# Patient Record
Sex: MALE | Race: WHITE | Employment: OTHER | URBAN - METROPOLITAN AREA
[De-identification: names, ages, dates, MRNs, and addresses within clinical notes are randomized per-mention and may not be internally consistent; named-entity substitution may affect disease eponyms.]

---

## 2017-01-27 ENCOUNTER — HOSPITAL ENCOUNTER (OUTPATIENT)
Dept: CT IMAGING | Facility: HOSPITAL | Age: 77
Discharge: HOME | End: 2017-01-27
Attending: INTERNAL MEDICINE

## 2017-01-27 DIAGNOSIS — Z13.9 SCREENING: ICD-10-CM

## 2017-01-30 ENCOUNTER — OFFICE VISIT (OUTPATIENT)
Dept: FAMILY MEDICINE CLINIC | Facility: CLINIC | Age: 77
End: 2017-01-30

## 2017-01-30 VITALS
RESPIRATION RATE: 16 BRPM | WEIGHT: 213 LBS | OXYGEN SATURATION: 98 % | HEART RATE: 74 BPM | BODY MASS INDEX: 29.82 KG/M2 | HEIGHT: 71 IN | TEMPERATURE: 97 F | DIASTOLIC BLOOD PRESSURE: 80 MMHG | SYSTOLIC BLOOD PRESSURE: 150 MMHG

## 2017-01-30 DIAGNOSIS — J45.30 MILD PERSISTENT ASTHMA WITHOUT COMPLICATION: ICD-10-CM

## 2017-01-30 DIAGNOSIS — I70.90 ATHEROSCLEROSIS: Primary | ICD-10-CM

## 2017-01-30 DIAGNOSIS — E11.9 CONTROLLED TYPE 2 DIABETES MELLITUS WITHOUT COMPLICATION, WITHOUT LONG-TERM CURRENT USE OF INSULIN (HCC): ICD-10-CM

## 2017-01-30 DIAGNOSIS — R93.1 ABNORMAL CT SCAN, HEART: ICD-10-CM

## 2017-01-30 PROCEDURE — 93000 ELECTROCARDIOGRAM COMPLETE: CPT | Performed by: FAMILY MEDICINE

## 2017-01-30 PROCEDURE — 99214 OFFICE O/P EST MOD 30 MIN: CPT | Performed by: FAMILY MEDICINE

## 2017-01-30 NOTE — PATIENT INSTRUCTIONS
Return for fasting blood work. Call cardiologist for evaluation. Call us back with your appointment date  with cardiologist.  Consider restarting inhalers for asthma. Low carbohydrate diet. Return for Super visit.

## 2017-01-31 ENCOUNTER — APPOINTMENT (OUTPATIENT)
Dept: LAB | Age: 77
End: 2017-01-31
Attending: FAMILY MEDICINE
Payer: MEDICARE

## 2017-01-31 ENCOUNTER — TELEPHONE (OUTPATIENT)
Dept: FAMILY MEDICINE CLINIC | Facility: CLINIC | Age: 77
End: 2017-01-31

## 2017-01-31 LAB
ALBUMIN SERPL-MCNC: 3.9 G/DL (ref 3.5–4.8)
ALP LIVER SERPL-CCNC: 51 U/L (ref 45–117)
ALT SERPL-CCNC: 32 U/L (ref 17–63)
AST SERPL-CCNC: 19 U/L (ref 15–41)
BASOPHILS # BLD AUTO: 0.04 X10(3) UL (ref 0–0.1)
BASOPHILS NFR BLD AUTO: 0.6 %
BILIRUB SERPL-MCNC: 1 MG/DL (ref 0.1–2)
BUN BLD-MCNC: 17 MG/DL (ref 8–20)
CALCIUM BLD-MCNC: 8.5 MG/DL (ref 8.3–10.3)
CHLORIDE: 104 MMOL/L (ref 101–111)
CHOLEST SMN-MCNC: 148 MG/DL (ref ?–200)
CO2: 26 MMOL/L (ref 22–32)
CREAT BLD-MCNC: 0.9 MG/DL (ref 0.7–1.3)
CREAT UR-SCNC: 191 MG/DL
EOSINOPHIL # BLD AUTO: 0.19 X10(3) UL (ref 0–0.3)
EOSINOPHIL NFR BLD AUTO: 2.6 %
ERYTHROCYTE [DISTWIDTH] IN BLOOD BY AUTOMATED COUNT: 12.5 % (ref 11.5–16)
EST. AVERAGE GLUCOSE BLD GHB EST-MCNC: 137 MG/DL (ref 68–126)
GLUCOSE BLD-MCNC: 124 MG/DL (ref 70–99)
HBA1C MFR BLD HPLC: 6.4 % (ref ?–5.7)
HCT VFR BLD AUTO: 48.7 % (ref 37–53)
HDLC SERPL-MCNC: 44 MG/DL (ref 45–?)
HDLC SERPL: 3.36 {RATIO} (ref ?–4.97)
HGB BLD-MCNC: 16.7 G/DL (ref 13–17)
IMMATURE GRANULOCYTE COUNT: 0.02 X10(3) UL (ref 0–1)
IMMATURE GRANULOCYTE RATIO %: 0.3 %
LDLC SERPL CALC-MCNC: 79 MG/DL (ref ?–130)
LYMPHOCYTES # BLD AUTO: 2.02 X10(3) UL (ref 0.9–4)
LYMPHOCYTES NFR BLD AUTO: 27.9 %
M PROTEIN MFR SERPL ELPH: 7.1 G/DL (ref 6.1–8.3)
MCH RBC QN AUTO: 32.4 PG (ref 27–33.2)
MCHC RBC AUTO-ENTMCNC: 34.3 G/DL (ref 31–37)
MCV RBC AUTO: 94.6 FL (ref 80–99)
MICROALBUMIN UR-MCNC: 1.08 MG/DL
MICROALBUMIN/CREAT 24H UR-RTO: 5.7 UG/MG (ref ?–30)
MONOCYTES # BLD AUTO: 0.57 X10(3) UL (ref 0.1–0.6)
MONOCYTES NFR BLD AUTO: 7.9 %
NEUTROPHIL ABS PRELIM: 4.39 X10 (3) UL (ref 1.3–6.7)
NEUTROPHILS # BLD AUTO: 4.39 X10(3) UL (ref 1.3–6.7)
NEUTROPHILS NFR BLD AUTO: 60.7 %
NONHDLC SERPL-MCNC: 104 MG/DL (ref ?–130)
PLATELET # BLD AUTO: 246 10(3)UL (ref 150–450)
POTASSIUM SERPL-SCNC: 4 MMOL/L (ref 3.6–5.1)
RBC # BLD AUTO: 5.15 X10(6)UL (ref 3.8–5.8)
RED CELL DISTRIBUTION WIDTH-SD: 43.1 FL (ref 35.1–46.3)
SODIUM SERPL-SCNC: 139 MMOL/L (ref 136–144)
TRIGLYCERIDES: 123 MG/DL (ref ?–150)
VLDL: 25 MG/DL (ref 5–40)
WBC # BLD AUTO: 7.2 X10(3) UL (ref 4–13)

## 2017-01-31 PROCEDURE — 83036 HEMOGLOBIN GLYCOSYLATED A1C: CPT | Performed by: FAMILY MEDICINE

## 2017-01-31 PROCEDURE — 82570 ASSAY OF URINE CREATININE: CPT | Performed by: FAMILY MEDICINE

## 2017-01-31 PROCEDURE — 85025 COMPLETE CBC W/AUTO DIFF WBC: CPT | Performed by: FAMILY MEDICINE

## 2017-01-31 PROCEDURE — 82043 UR ALBUMIN QUANTITATIVE: CPT | Performed by: FAMILY MEDICINE

## 2017-01-31 PROCEDURE — 80061 LIPID PANEL: CPT | Performed by: FAMILY MEDICINE

## 2017-01-31 PROCEDURE — 36415 COLL VENOUS BLD VENIPUNCTURE: CPT | Performed by: FAMILY MEDICINE

## 2017-01-31 PROCEDURE — 80053 COMPREHEN METABOLIC PANEL: CPT | Performed by: FAMILY MEDICINE

## 2017-01-31 NOTE — PROGRESS NOTES
Chuy Baez is a 77year old male. cc abnormal ultrafast heart scan results, diabetes, asthma,  HPI:     Patient is coming to discuss test results of the ultrafast heart scan. The score was 1334.3 .   Patient had ultrafast heart scan done 2 years ago breath with exertion  CARDIOVASCULAR: denies chest pain on exertion, no chest pain at rest no palpitations  GI: denies abdominal pain and denies heartburn  NEURO: denies headaches  Psych normal mood    EXAM:   /80 mmHg  Pulse 74  Temp(Src) 97.2 °F (3 greatest dimension. The specimen is submitted in   toto in one cassette.  (ZQ/nj) [FORMATTING REMOVED]    Interpretation Abnormal (A)      ASSESSMENT AND PLAN:   Atherosclerosis  (primary encounter diagnosis)  Controlled type 2 diabetes mellitus without com

## 2017-01-31 NOTE — TELEPHONE ENCOUNTER
Dr. Una Wilson wanted pt to let her know when he is seeing cardiologist, Dr. Danuta Chan. Pt's appt is tomorrow, Feb 1 at 11:30.

## 2017-02-01 ENCOUNTER — MYAURORA ACCOUNT LINK (OUTPATIENT)
Dept: OTHER | Age: 77
End: 2017-02-01

## 2017-02-01 ENCOUNTER — PRIOR ORIGINAL RECORDS (OUTPATIENT)
Dept: OTHER | Age: 77
End: 2017-02-01

## 2017-02-02 ENCOUNTER — TELEPHONE (OUTPATIENT)
Dept: FAMILY MEDICINE CLINIC | Facility: CLINIC | Age: 77
End: 2017-02-02

## 2017-02-06 LAB
ALBUMIN: 3.9 G/DL
ALKALINE PHOSPHATATE(ALK PHOS): 51 IU/L
BILIRUBIN TOTAL: 1 MG/DL
BUN: 17 MG/DL
CALCIUM: 8.5 MG/DL
CHLORIDE: 104 MEQ/L
CHOLESTEROL, TOTAL: 148 MG/DL
CREATININE, SERUM: 0.9 MG/DL
GLUCOSE: 124 MG/DL
HDL CHOLESTEROL: 44 MG/DL
HEMATOCRIT: 48.7 %
HEMOGLOBIN A1C: 6.4 %
HEMOGLOBIN: 16.7 G/DL
LDL CHOLESTEROL: 79 MG/DL
PLATELETS: 246 K/UL
POTASSIUM, SERUM: 4 MEQ/L
PROTEIN, TOTAL: 7.1 G/DL
RED BLOOD COUNT: 5.15 X 10-6/U
SGOT (AST): 19 IU/L
SGPT (ALT): 32 IU/L
SODIUM: 139 MEQ/L
TRIGLYCERIDES: 123 MG/DL
WHITE BLOOD COUNT: 7.2 X 10-3/U

## 2017-02-07 ENCOUNTER — HOSPITAL ENCOUNTER (OUTPATIENT)
Dept: CV DIAGNOSTICS | Facility: HOSPITAL | Age: 77
Discharge: HOME | End: 2017-02-07
Attending: INTERNAL MEDICINE
Payer: MEDICARE

## 2017-02-07 DIAGNOSIS — Z82.49 FAMILY HISTORY OF ISCHEMIC HEART DISEASE: ICD-10-CM

## 2017-02-07 DIAGNOSIS — R93.1 ABNORMAL CT SCAN OF HEART: ICD-10-CM

## 2017-02-07 DIAGNOSIS — E11.9 DIABETES MELLITUS (HCC): ICD-10-CM

## 2017-02-07 PROCEDURE — 93017 CV STRESS TEST TRACING ONLY: CPT

## 2017-02-07 PROCEDURE — 78452 HT MUSCLE IMAGE SPECT MULT: CPT | Performed by: INTERNAL MEDICINE

## 2017-02-07 PROCEDURE — 93018 CV STRESS TEST I&R ONLY: CPT | Performed by: INTERNAL MEDICINE

## 2017-02-07 PROCEDURE — 78452 HT MUSCLE IMAGE SPECT MULT: CPT

## 2017-02-09 ENCOUNTER — PRIOR ORIGINAL RECORDS (OUTPATIENT)
Dept: OTHER | Age: 77
End: 2017-02-09

## 2017-02-10 ENCOUNTER — TELEPHONE (OUTPATIENT)
Dept: OTHER | Facility: HOSPITAL | Age: 77
End: 2017-02-10

## 2017-02-10 NOTE — PROGRESS NOTES
F/U call made to patient regarding CTHS score of 1334.3; patient reports that he had a stress test and is doing well. All questions and concerns addressed at this time.

## 2017-02-13 ENCOUNTER — PRIOR ORIGINAL RECORDS (OUTPATIENT)
Dept: OTHER | Age: 77
End: 2017-02-13

## 2017-04-24 ENCOUNTER — TELEPHONE (OUTPATIENT)
Dept: FAMILY MEDICINE CLINIC | Facility: CLINIC | Age: 77
End: 2017-04-24

## 2017-04-24 NOTE — TELEPHONE ENCOUNTER
Please call patient to schedule an office visit with Dr. Kadeem Reynoso for hypertention / blood pressure.   Last ov 1/30/17

## 2017-04-26 PROBLEM — J44.89 ASTHMA WITH COPD (CHRONIC OBSTRUCTIVE PULMONARY DISEASE) (HCC): Chronic | Status: ACTIVE | Noted: 2017-04-26

## 2017-04-26 PROBLEM — J44.9 ASTHMA WITH COPD (CHRONIC OBSTRUCTIVE PULMONARY DISEASE) (HCC): Chronic | Status: ACTIVE | Noted: 2017-04-26

## 2017-04-26 PROBLEM — J44.89 ASTHMA WITH COPD (CHRONIC OBSTRUCTIVE PULMONARY DISEASE): Chronic | Status: ACTIVE | Noted: 2017-04-26

## 2017-04-26 NOTE — TELEPHONE ENCOUNTER
Spoke with patient and informed him of below needed appt. Pt declined scheduling this now. Would like to wait until August and then will schedule.

## 2017-05-15 NOTE — TELEPHONE ENCOUNTER
Spoke with the patient wife regarding scheduling his [employer]. She stated that she will discuss the time with him when they will be able to schedule.

## 2017-05-26 NOTE — TELEPHONE ENCOUNTER
Spoke with the patient wife Meño regarding scheduling his [employer]. Maria Ines Ruizsharon stated that she explained to him that he should schedule his appt and per Nicolasa Du  He stated that \" he doesn't need that'. She is unsure if he will call and schedule.

## 2017-06-26 ENCOUNTER — TELEPHONE (OUTPATIENT)
Dept: FAMILY MEDICINE CLINIC | Facility: CLINIC | Age: 77
End: 2017-06-26

## 2017-06-26 NOTE — TELEPHONE ENCOUNTER
Called patient to schedule a 60 minute MA Supervisit in June. Patient states he does not want to schedule the visit in June but would like a call back in August to schedule the visit in September.

## 2017-09-08 ENCOUNTER — MA CHART PREP (OUTPATIENT)
Dept: FAMILY MEDICINE CLINIC | Facility: CLINIC | Age: 77
End: 2017-09-08

## 2017-09-08 PROBLEM — D12.6 ADENOMATOUS COLON POLYP: Status: ACTIVE | Noted: 2017-09-08

## 2017-09-08 PROBLEM — I25.10 CORONARY ATHEROSCLEROSIS: Status: ACTIVE | Noted: 2017-09-08

## 2017-09-08 PROBLEM — Z86.0101 HISTORY OF ADENOMATOUS POLYP OF COLON: Status: ACTIVE | Noted: 2017-09-08

## 2017-09-08 PROBLEM — Z86.010 HISTORY OF ADENOMATOUS POLYP OF COLON: Status: ACTIVE | Noted: 2017-09-08

## 2017-09-11 ENCOUNTER — OFFICE VISIT (OUTPATIENT)
Dept: FAMILY MEDICINE CLINIC | Facility: CLINIC | Age: 77
End: 2017-09-11

## 2017-09-11 VITALS
BODY MASS INDEX: 30.8 KG/M2 | RESPIRATION RATE: 16 BRPM | HEART RATE: 71 BPM | DIASTOLIC BLOOD PRESSURE: 72 MMHG | SYSTOLIC BLOOD PRESSURE: 132 MMHG | TEMPERATURE: 98 F | HEIGHT: 71 IN | WEIGHT: 220 LBS

## 2017-09-11 DIAGNOSIS — Z96.651 STATUS POST TOTAL RIGHT KNEE REPLACEMENT: ICD-10-CM

## 2017-09-11 DIAGNOSIS — M17.11 PRIMARY LOCALIZED OSTEOARTHROSIS OF RIGHT LOWER LEG: ICD-10-CM

## 2017-09-11 DIAGNOSIS — R35.1 NOCTURIA: ICD-10-CM

## 2017-09-11 DIAGNOSIS — E66.9 OBESITY (BMI 30.0-34.9): ICD-10-CM

## 2017-09-11 DIAGNOSIS — Z86.010 HISTORY OF ADENOMATOUS POLYP OF COLON: ICD-10-CM

## 2017-09-11 DIAGNOSIS — E11.9 TYPE 2 DIABETES MELLITUS WITHOUT COMPLICATION, WITHOUT LONG-TERM CURRENT USE OF INSULIN (HCC): ICD-10-CM

## 2017-09-11 DIAGNOSIS — J45.20 MILD INTERMITTENT ASTHMA WITHOUT COMPLICATION: ICD-10-CM

## 2017-09-11 DIAGNOSIS — Z00.00 ENCOUNTER FOR ANNUAL HEALTH EXAMINATION: Primary | ICD-10-CM

## 2017-09-11 DIAGNOSIS — Z98.49 HISTORY OF CATARACT EXTRACTION, UNSPECIFIED LATERALITY: ICD-10-CM

## 2017-09-11 DIAGNOSIS — I25.10 ATHEROSCLEROSIS OF NATIVE CORONARY ARTERY OF NATIVE HEART WITHOUT ANGINA PECTORIS: ICD-10-CM

## 2017-09-11 PROBLEM — E66.811 OBESITY (BMI 30.0-34.9): Status: ACTIVE | Noted: 2017-09-11

## 2017-09-11 PROCEDURE — 90653 IIV ADJUVANT VACCINE IM: CPT | Performed by: FAMILY MEDICINE

## 2017-09-11 PROCEDURE — 96160 PT-FOCUSED HLTH RISK ASSMT: CPT | Performed by: FAMILY MEDICINE

## 2017-09-11 PROCEDURE — G0439 PPPS, SUBSEQ VISIT: HCPCS | Performed by: FAMILY MEDICINE

## 2017-09-11 PROCEDURE — G0008 ADMIN INFLUENZA VIRUS VAC: HCPCS | Performed by: FAMILY MEDICINE

## 2017-09-11 NOTE — PROGRESS NOTES
HPI:   Chuy Alvarez is a 77year old male who presents for a MA (Medicare Advantage) [address] [facility] (Once per calendar year) .        His last annual assessment has been over 1 year: Annual Physical due on 01/25/2017         Patient Care Team: Patient C Other in his mother. SOCIAL HISTORY:   He  reports that he has never smoked. He has never used smokeless tobacco. He reports that he drinks alcohol. He reports that he does not use drugs.      REVIEW OF SYSTEMS:   GENERAL: feels well otherwise  SKIN: ilana adenopathy, thyroid: not enlarged, symmetric, no tenderness/mass/nodules,   Back:   Symmetric, no curvature, ROM normal, no CVA tenderness   Lungs:   Clear to auscultation bilaterally, respirations unlabored   Chest Wall:  No tenderness or deformity   Hear pectoris    History of adenomatous polyp of colon    Nocturia  -     PSA;  Future    Status post total right knee replacement    Primary localized osteoarthrosis of right lower leg    Obesity (BMI 30.0-34.9)    Other orders  -     FLU VACCINE ADJUVANT IM been poor?: No    How does the patient maintain a good energy level?: Appropriate Exercise;Daily Walks    How would you describe your daily physical activity?: Light    How would you describe your current health state?: Good    How do you maintain positive separate sheet to patient  PREVENTATIVE SERVICES  INDICATIONS AND SCHEDULE Internal Lab or Procedure External Lab or Procedure   Diabetes Screening      HbgA1C   Annually HEMOGLOBIN A1c (% of total Hgb)   Date Value   04/29/2016 6.8 (H)     HgbA1C (%)   Da digoxin diuretics, anticonvulsants.)    Potassium  Annually Potassium (mmol/L)   Date Value   01/31/2017 4.0     POTASSIUM (mmol/L)   Date Value   04/29/2016 4.3    No flowsheet data found.     Creatinine  Annually CREATININE (mg/dL)   Date Value   04/29/20

## 2017-09-11 NOTE — PATIENT INSTRUCTIONS
Healthy diet. Stay active. Return for fasting blood work. Chuy Dietz's SCREENING SCHEDULE   Tests on this list are recommended by your physician but may not be covered, or covered at this frequency, by your insurer.  Please check with your insu Abdominal aortic aneurysm screening (once between ages 73-68)  No results found for this or any previous visit.  Limited to patients who meet one of the following criteria:   • Men who are 73-68 years old and have smoked more than 100 cigarettes in their received Factor VIII or IX concentrates   Clients of institutions for the mentally retarded   Persons who live in the same house as a HepB virus carrier   Homosexual men   Illicit injectable drug abusers     Tetanus Toxoid- Only covered with a cut with met

## 2017-09-15 ENCOUNTER — APPOINTMENT (OUTPATIENT)
Dept: LAB | Age: 77
End: 2017-09-15
Attending: FAMILY MEDICINE
Payer: MEDICARE

## 2017-09-15 DIAGNOSIS — R35.1 NOCTURIA: ICD-10-CM

## 2017-09-15 DIAGNOSIS — E11.9 TYPE 2 DIABETES MELLITUS WITHOUT COMPLICATION, WITHOUT LONG-TERM CURRENT USE OF INSULIN (HCC): ICD-10-CM

## 2017-09-15 LAB
ALBUMIN SERPL-MCNC: 3.7 G/DL (ref 3.5–4.8)
ALP LIVER SERPL-CCNC: 49 U/L (ref 45–117)
ALT SERPL-CCNC: 31 U/L (ref 17–63)
AST SERPL-CCNC: 16 U/L (ref 15–41)
BILIRUB SERPL-MCNC: 0.6 MG/DL (ref 0.1–2)
BUN BLD-MCNC: 15 MG/DL (ref 8–20)
CALCIUM BLD-MCNC: 9 MG/DL (ref 8.3–10.3)
CHLORIDE: 108 MMOL/L (ref 101–111)
CHOLEST SMN-MCNC: 137 MG/DL (ref ?–200)
CO2: 26 MMOL/L (ref 22–32)
CREAT BLD-MCNC: 0.88 MG/DL (ref 0.7–1.3)
CREAT UR-SCNC: 164 MG/DL
EST. AVERAGE GLUCOSE BLD GHB EST-MCNC: 143 MG/DL (ref 68–126)
GLUCOSE BLD-MCNC: 134 MG/DL (ref 70–99)
HBA1C MFR BLD HPLC: 6.6 % (ref ?–5.7)
HDLC SERPL-MCNC: 39 MG/DL (ref 45–?)
HDLC SERPL: 3.51 {RATIO} (ref ?–4.97)
LDLC SERPL CALC-MCNC: 73 MG/DL (ref ?–130)
LDLC SERPL-MCNC: 25 MG/DL (ref 5–40)
M PROTEIN MFR SERPL ELPH: 7 G/DL (ref 6.1–8.3)
MICROALBUMIN UR-MCNC: 0.78 MG/DL
MICROALBUMIN/CREAT 24H UR-RTO: 4.8 UG/MG (ref ?–30)
NONHDLC SERPL-MCNC: 98 MG/DL (ref ?–130)
POTASSIUM SERPL-SCNC: 4.3 MMOL/L (ref 3.6–5.1)
PSA SERPL-MCNC: 2.44 NG/ML (ref 0.01–4)
SODIUM SERPL-SCNC: 141 MMOL/L (ref 136–144)
TRIGLYCERIDES: 127 MG/DL (ref ?–150)

## 2017-09-15 PROCEDURE — 82043 UR ALBUMIN QUANTITATIVE: CPT

## 2017-09-15 PROCEDURE — 83036 HEMOGLOBIN GLYCOSYLATED A1C: CPT

## 2017-09-15 PROCEDURE — 80061 LIPID PANEL: CPT

## 2017-09-15 PROCEDURE — 82570 ASSAY OF URINE CREATININE: CPT

## 2017-09-15 PROCEDURE — 84153 ASSAY OF PSA TOTAL: CPT

## 2017-09-15 PROCEDURE — 80053 COMPREHEN METABOLIC PANEL: CPT

## 2017-09-15 PROCEDURE — 36415 COLL VENOUS BLD VENIPUNCTURE: CPT

## 2017-09-18 DIAGNOSIS — E11.9 TYPE 2 DIABETES MELLITUS WITHOUT COMPLICATION, WITHOUT LONG-TERM CURRENT USE OF INSULIN (HCC): Primary | ICD-10-CM

## 2018-03-20 ENCOUNTER — TELEPHONE (OUTPATIENT)
Dept: FAMILY MEDICINE CLINIC | Facility: CLINIC | Age: 78
End: 2018-03-20

## 2018-03-20 NOTE — TELEPHONE ENCOUNTER
LMTCB and schedule first available date/time for MA Supervisit for 45 minutes with Dr Brianna Crandall. Last was 9/11/17, does not have to be 366 days apart. Please try to schedule by 6/30/18.

## 2018-06-08 ENCOUNTER — OFFICE VISIT (OUTPATIENT)
Dept: FAMILY MEDICINE CLINIC | Facility: CLINIC | Age: 78
End: 2018-06-08

## 2018-06-08 VITALS
HEIGHT: 71 IN | BODY MASS INDEX: 30.52 KG/M2 | DIASTOLIC BLOOD PRESSURE: 68 MMHG | RESPIRATION RATE: 16 BRPM | WEIGHT: 218 LBS | HEART RATE: 74 BPM | SYSTOLIC BLOOD PRESSURE: 124 MMHG

## 2018-06-08 DIAGNOSIS — I25.10 ATHEROSCLEROSIS OF NATIVE CORONARY ARTERY OF NATIVE HEART WITHOUT ANGINA PECTORIS: ICD-10-CM

## 2018-06-08 DIAGNOSIS — M17.11 PRIMARY LOCALIZED OSTEOARTHROSIS OF RIGHT LOWER LEG: ICD-10-CM

## 2018-06-08 DIAGNOSIS — E66.9 OBESITY (BMI 30.0-34.9): ICD-10-CM

## 2018-06-08 DIAGNOSIS — R35.1 NOCTURIA: ICD-10-CM

## 2018-06-08 DIAGNOSIS — Z86.010 HISTORY OF ADENOMATOUS POLYP OF COLON: ICD-10-CM

## 2018-06-08 DIAGNOSIS — J45.20 MILD INTERMITTENT ASTHMA WITHOUT COMPLICATION: ICD-10-CM

## 2018-06-08 DIAGNOSIS — Z98.41 HISTORY OF RIGHT CATARACT EXTRACTION: ICD-10-CM

## 2018-06-08 DIAGNOSIS — E11.9 TYPE 2 DIABETES MELLITUS WITHOUT COMPLICATION, WITHOUT LONG-TERM CURRENT USE OF INSULIN (HCC): ICD-10-CM

## 2018-06-08 DIAGNOSIS — Z00.00 ENCOUNTER FOR ANNUAL HEALTH EXAMINATION: Primary | ICD-10-CM

## 2018-06-08 DIAGNOSIS — Z96.651 STATUS POST TOTAL RIGHT KNEE REPLACEMENT: ICD-10-CM

## 2018-06-08 DIAGNOSIS — Z98.42 HISTORY OF CATARACT SURGERY, LEFT: ICD-10-CM

## 2018-06-08 PROBLEM — J44.89 ASTHMA WITH COPD (CHRONIC OBSTRUCTIVE PULMONARY DISEASE): Chronic | Status: ACTIVE | Noted: 2018-06-08

## 2018-06-08 PROBLEM — J44.89 ASTHMA WITH COPD (CHRONIC OBSTRUCTIVE PULMONARY DISEASE) (HCC): Chronic | Status: ACTIVE | Noted: 2018-06-08

## 2018-06-08 PROBLEM — J44.9 ASTHMA WITH COPD (CHRONIC OBSTRUCTIVE PULMONARY DISEASE) (HCC): Chronic | Status: ACTIVE | Noted: 2018-06-08

## 2018-06-08 PROCEDURE — 90732 PPSV23 VACC 2 YRS+ SUBQ/IM: CPT | Performed by: FAMILY MEDICINE

## 2018-06-08 PROCEDURE — G0439 PPPS, SUBSEQ VISIT: HCPCS | Performed by: FAMILY MEDICINE

## 2018-06-08 PROCEDURE — G0009 ADMIN PNEUMOCOCCAL VACCINE: HCPCS | Performed by: FAMILY MEDICINE

## 2018-06-08 PROCEDURE — 96160 PT-FOCUSED HLTH RISK ASSMT: CPT | Performed by: FAMILY MEDICINE

## 2018-06-08 NOTE — PROGRESS NOTES
HPI:   Chuy Hurtado is a 69year old male who presents for a MA (Medicare Advantage) [facility] (Once per calendar year) .        His last annual assessment has been over 1 year: Annual Physical due on 01/25/2017         Patient Care Team: Patient C and colonoscopy,biopsy (N/A, 5/14/2016). His family history includes Cancer in his father; Other in his mother. SOCIAL HISTORY:   He  reports that he has never smoked. He has never used smokeless tobacco. He reports that he drinks alcohol.  He reports adenopathy, thyroid: not enlarged, symmetric, no tenderness/mass/nodules,   Back:   Symmetric, no curvature, ROM normal, no CVA tenderness   Lungs:   Clear to auscultation bilaterally, respirations unlabored   Chest Wall:  No tenderness or deformity   Hear extraction  -     OPHTHALMOLOGY - INTERNAL    History of cataract surgery, left  -     OPHTHALMOLOGY - INTERNAL    Obesity (BMI 30.0-34. 9)    Mild intermittent asthma without complication    History of adenomatous polyp of colon    Nocturia  -     PSA;  Fut agrees to the plan. No Follow-up on file.      Jonh Cat MD, 9/11/2017       General Health     In the past six months, have you lost more than 10 pounds without trying?: 2 - No    Has your appetite been poor?: No    How does the patient [facility] input here.   Cognitive Assessment     What day of the week is this?: Correct    What month is it?: Correct    What year is it?: Correct    Recall \"Ball\": Correct    Recall \"Flag\": Correct    Recall \"Tree\": Correct       This section provided for quic Orders placed or performed in visit on 06/08/18  -PNEUMOCOCCAL IMM (PNEUMOVAX)        Hepatitis B No orders found for this or any previous visit. Tetanus No orders found for this or any previous visit.          [facility] Internal Lab

## 2018-06-08 NOTE — PATIENT INSTRUCTIONS
Shingrix - shingles shot. Healthy diet. Stay active. Do fasting blood tests. Call eye doctor for evaluation.      Max Ellena Kawasaki Kolbeck's SCREENING SCHEDULE   Tests on this list are recommended by your physician but may not be covered, or covered at this covered service except at the Welcome to Medicare Visit    Abdominal aortic aneurysm screening (once between ages 73-68)  No results found for this or any previous visit.  Limited to patients who meet one of the following criteria:   • Men who are 65-75 yea for this or any previous visit.  Medium/high risk factors:   End-stage renal disease   Hemophiliacs who received Factor VIII or IX concentrates   Clients of institutions for the mentally retarded   Persons who live in the same house as a HepB virus carrier

## 2018-06-18 ENCOUNTER — APPOINTMENT (OUTPATIENT)
Dept: LAB | Age: 78
End: 2018-06-18
Attending: FAMILY MEDICINE
Payer: MEDICARE

## 2018-06-18 DIAGNOSIS — R35.1 NOCTURIA: ICD-10-CM

## 2018-06-18 DIAGNOSIS — E11.9 TYPE 2 DIABETES MELLITUS WITHOUT COMPLICATION, WITHOUT LONG-TERM CURRENT USE OF INSULIN (HCC): ICD-10-CM

## 2018-06-18 DIAGNOSIS — I25.10 ATHEROSCLEROSIS OF NATIVE CORONARY ARTERY OF NATIVE HEART WITHOUT ANGINA PECTORIS: ICD-10-CM

## 2018-06-18 PROCEDURE — 83036 HEMOGLOBIN GLYCOSYLATED A1C: CPT

## 2018-06-18 PROCEDURE — 82043 UR ALBUMIN QUANTITATIVE: CPT

## 2018-06-18 PROCEDURE — 36415 COLL VENOUS BLD VENIPUNCTURE: CPT

## 2018-06-18 PROCEDURE — 80061 LIPID PANEL: CPT

## 2018-06-18 PROCEDURE — 84153 ASSAY OF PSA TOTAL: CPT

## 2018-06-18 PROCEDURE — 81003 URINALYSIS AUTO W/O SCOPE: CPT

## 2018-06-18 PROCEDURE — 82570 ASSAY OF URINE CREATININE: CPT

## 2018-06-18 PROCEDURE — 80053 COMPREHEN METABOLIC PANEL: CPT

## 2018-06-21 DIAGNOSIS — E11.9 TYPE 2 DIABETES MELLITUS WITHOUT COMPLICATION, WITHOUT LONG-TERM CURRENT USE OF INSULIN (HCC): Primary | ICD-10-CM

## 2018-08-14 PROBLEM — E11.9 DIABETES MELLITUS TYPE 2 WITHOUT RETINOPATHY (HCC): Status: ACTIVE | Noted: 2018-08-14

## 2018-08-14 PROBLEM — D31.31 CHOROIDAL NEVUS OF RIGHT EYE: Status: ACTIVE | Noted: 2018-08-14

## 2018-08-14 PROBLEM — Z96.1 PSEUDOPHAKIA: Status: ACTIVE | Noted: 2018-08-14

## 2018-08-30 ENCOUNTER — HOSPITAL ENCOUNTER (EMERGENCY)
Facility: HOSPITAL | Age: 78
Discharge: HOME | End: 2018-08-30
Attending: STUDENT IN AN ORGANIZED HEALTH CARE EDUCATION/TRAINING PROGRAM
Payer: MEDICARE

## 2018-08-30 ENCOUNTER — APPOINTMENT (OUTPATIENT)
Dept: CT IMAGING | Facility: HOSPITAL | Age: 78
End: 2018-08-30
Attending: STUDENT IN AN ORGANIZED HEALTH CARE EDUCATION/TRAINING PROGRAM
Payer: MEDICARE

## 2018-08-30 VITALS
HEIGHT: 71 IN | DIASTOLIC BLOOD PRESSURE: 83 MMHG | WEIGHT: 210 LBS | BODY MASS INDEX: 29.4 KG/M2 | SYSTOLIC BLOOD PRESSURE: 149 MMHG | TEMPERATURE: 98 F | HEART RATE: 66 BPM | OXYGEN SATURATION: 95 % | RESPIRATION RATE: 18 BRPM

## 2018-08-30 DIAGNOSIS — N23 RENAL COLIC: Primary | ICD-10-CM

## 2018-08-30 DIAGNOSIS — R10.32 LLQ ABDOMINAL PAIN: ICD-10-CM

## 2018-08-30 LAB
ALBUMIN SERPL-MCNC: 3.9 G/DL (ref 3.5–4.8)
ALBUMIN/GLOB SERPL: 1.2 {RATIO} (ref 1–2)
ALP LIVER SERPL-CCNC: 53 U/L (ref 45–117)
ALT SERPL-CCNC: 27 U/L (ref 17–63)
ANION GAP SERPL CALC-SCNC: 7 MMOL/L (ref 0–18)
AST SERPL-CCNC: 26 U/L (ref 15–41)
BASOPHILS # BLD AUTO: 0.02 X10(3) UL (ref 0–0.1)
BASOPHILS NFR BLD AUTO: 0.2 %
BILIRUB SERPL-MCNC: 1.2 MG/DL (ref 0.1–2)
BILIRUB UR QL STRIP.AUTO: NEGATIVE
BUN BLD-MCNC: 19 MG/DL (ref 8–20)
BUN/CREAT SERPL: 16.8 (ref 10–20)
CALCIUM BLD-MCNC: 8.9 MG/DL (ref 8.3–10.3)
CHLORIDE SERPL-SCNC: 104 MMOL/L (ref 101–111)
CO2 SERPL-SCNC: 24 MMOL/L (ref 22–32)
COLOR UR AUTO: YELLOW
CREAT BLD-MCNC: 1.13 MG/DL (ref 0.7–1.3)
EOSINOPHIL # BLD AUTO: 0.07 X10(3) UL (ref 0–0.3)
EOSINOPHIL NFR BLD AUTO: 0.6 %
ERYTHROCYTE [DISTWIDTH] IN BLOOD BY AUTOMATED COUNT: 12.1 % (ref 11.5–16)
GLOBULIN PLAS-MCNC: 3.3 G/DL (ref 2.5–4)
GLUCOSE BLD-MCNC: 167 MG/DL (ref 70–99)
GLUCOSE UR STRIP.AUTO-MCNC: NEGATIVE MG/DL
HCT VFR BLD AUTO: 47.1 % (ref 37–53)
HGB BLD-MCNC: 16.7 G/DL (ref 13–17)
IMMATURE GRANULOCYTE COUNT: 0.03 X10(3) UL (ref 0–1)
IMMATURE GRANULOCYTE RATIO %: 0.3 %
KETONES UR STRIP.AUTO-MCNC: NEGATIVE MG/DL
LIPASE: 90 U/L (ref 73–393)
LYMPHOCYTES # BLD AUTO: 1.03 X10(3) UL (ref 0.9–4)
LYMPHOCYTES NFR BLD AUTO: 9.5 %
M PROTEIN MFR SERPL ELPH: 7.2 G/DL (ref 6.1–8.3)
MCH RBC QN AUTO: 32.1 PG (ref 27–33.2)
MCHC RBC AUTO-ENTMCNC: 35.5 G/DL (ref 31–37)
MCV RBC AUTO: 90.4 FL (ref 80–99)
MONOCYTES # BLD AUTO: 0.96 X10(3) UL (ref 0.1–1)
MONOCYTES NFR BLD AUTO: 8.8 %
NEUTROPHIL ABS PRELIM: 8.75 X10 (3) UL (ref 1.3–6.7)
NEUTROPHILS # BLD AUTO: 8.75 X10(3) UL (ref 1.3–6.7)
NEUTROPHILS NFR BLD AUTO: 80.6 %
NITRITE UR QL STRIP.AUTO: NEGATIVE
OSMOLALITY SERPL CALC.SUM OF ELEC: 286 MOSM/KG (ref 275–295)
PH UR STRIP.AUTO: 5 [PH] (ref 4.5–8)
PLATELET # BLD AUTO: 199 10(3)UL (ref 150–450)
POTASSIUM SERPL-SCNC: 4.1 MMOL/L (ref 3.6–5.1)
PROT UR STRIP.AUTO-MCNC: NEGATIVE MG/DL
RBC # BLD AUTO: 5.21 X10(6)UL (ref 3.8–5.8)
RBC #/AREA URNS AUTO: >10 /HPF
RED CELL DISTRIBUTION WIDTH-SD: 39.6 FL (ref 35.1–46.3)
SODIUM SERPL-SCNC: 135 MMOL/L (ref 136–144)
SP GR UR STRIP.AUTO: 1.05 (ref 1–1.03)
UROBILINOGEN UR STRIP.AUTO-MCNC: <2 MG/DL
WBC # BLD AUTO: 10.9 X10(3) UL (ref 4–13)

## 2018-08-30 PROCEDURE — 74177 CT ABD & PELVIS W/CONTRAST: CPT | Performed by: STUDENT IN AN ORGANIZED HEALTH CARE EDUCATION/TRAINING PROGRAM

## 2018-08-30 PROCEDURE — 87086 URINE CULTURE/COLONY COUNT: CPT | Performed by: STUDENT IN AN ORGANIZED HEALTH CARE EDUCATION/TRAINING PROGRAM

## 2018-08-30 PROCEDURE — 80053 COMPREHEN METABOLIC PANEL: CPT | Performed by: STUDENT IN AN ORGANIZED HEALTH CARE EDUCATION/TRAINING PROGRAM

## 2018-08-30 PROCEDURE — 99284 EMERGENCY DEPT VISIT MOD MDM: CPT

## 2018-08-30 PROCEDURE — 81001 URINALYSIS AUTO W/SCOPE: CPT | Performed by: STUDENT IN AN ORGANIZED HEALTH CARE EDUCATION/TRAINING PROGRAM

## 2018-08-30 PROCEDURE — 83690 ASSAY OF LIPASE: CPT | Performed by: STUDENT IN AN ORGANIZED HEALTH CARE EDUCATION/TRAINING PROGRAM

## 2018-08-30 PROCEDURE — 85025 COMPLETE CBC W/AUTO DIFF WBC: CPT | Performed by: STUDENT IN AN ORGANIZED HEALTH CARE EDUCATION/TRAINING PROGRAM

## 2018-08-30 PROCEDURE — 96374 THER/PROPH/DIAG INJ IV PUSH: CPT

## 2018-08-30 RX ORDER — KETOROLAC TROMETHAMINE 30 MG/ML
15 INJECTION, SOLUTION INTRAMUSCULAR; INTRAVENOUS ONCE
Status: COMPLETED | OUTPATIENT
Start: 2018-08-30 | End: 2018-08-30

## 2018-08-30 RX ORDER — TAMSULOSIN HYDROCHLORIDE 0.4 MG/1
0.4 CAPSULE ORAL DAILY
Qty: 7 CAPSULE | Refills: 0 | Status: SHIPPED | OUTPATIENT
Start: 2018-08-30 | End: 2018-09-07

## 2018-08-30 RX ORDER — HYDROCODONE BITARTRATE AND ACETAMINOPHEN 5; 325 MG/1; MG/1
1-2 TABLET ORAL EVERY 4 HOURS PRN
Qty: 10 TABLET | Refills: 0 | Status: SHIPPED | OUTPATIENT
Start: 2018-08-30 | End: 2018-08-31 | Stop reason: ALTCHOICE

## 2018-08-30 RX ORDER — ONDANSETRON 4 MG/1
4 TABLET, ORALLY DISINTEGRATING ORAL EVERY 8 HOURS PRN
Qty: 10 TABLET | Refills: 0 | Status: SHIPPED | OUTPATIENT
Start: 2018-08-30 | End: 2018-08-31 | Stop reason: ALTCHOICE

## 2018-08-30 RX ORDER — KETOROLAC TROMETHAMINE 10 MG/1
10 TABLET, FILM COATED ORAL EVERY 6 HOURS PRN
Qty: 20 TABLET | Refills: 0 | Status: SHIPPED | OUTPATIENT
Start: 2018-08-30 | End: 2018-09-06

## 2018-08-31 ENCOUNTER — OFFICE VISIT (OUTPATIENT)
Dept: FAMILY MEDICINE CLINIC | Facility: CLINIC | Age: 78
End: 2018-08-31

## 2018-08-31 VITALS
BODY MASS INDEX: 29.82 KG/M2 | SYSTOLIC BLOOD PRESSURE: 104 MMHG | WEIGHT: 213 LBS | DIASTOLIC BLOOD PRESSURE: 52 MMHG | RESPIRATION RATE: 16 BRPM | HEART RATE: 70 BPM | TEMPERATURE: 97 F | HEIGHT: 71 IN

## 2018-08-31 DIAGNOSIS — N20.0 NEPHROLITHIASIS: ICD-10-CM

## 2018-08-31 DIAGNOSIS — I70.0 ATHEROSCLEROSIS OF ABDOMINAL AORTA (HCC): ICD-10-CM

## 2018-08-31 DIAGNOSIS — K76.0 FATTY LIVER: ICD-10-CM

## 2018-08-31 DIAGNOSIS — N23 RENAL COLIC ON LEFT SIDE: Primary | ICD-10-CM

## 2018-08-31 PROBLEM — J44.89 ASTHMA WITH COPD (CHRONIC OBSTRUCTIVE PULMONARY DISEASE): Chronic | Status: ACTIVE | Noted: 2018-08-31

## 2018-08-31 PROBLEM — J44.89 ASTHMA WITH COPD (CHRONIC OBSTRUCTIVE PULMONARY DISEASE) (HCC): Chronic | Status: ACTIVE | Noted: 2018-08-31

## 2018-08-31 PROBLEM — J44.9 ASTHMA WITH COPD (CHRONIC OBSTRUCTIVE PULMONARY DISEASE) (HCC): Chronic | Status: ACTIVE | Noted: 2018-08-31

## 2018-08-31 PROCEDURE — 99214 OFFICE O/P EST MOD 30 MIN: CPT | Performed by: FAMILY MEDICINE

## 2018-08-31 NOTE — PROGRESS NOTES
Chuy Lozano is a 69year old male. cc here ER  follow-up for left-sided abdominal pain, go over CT scan results  HPI:   Patient is coming for emergency room follow-up visit.  2 days before visit he started to have pain on the left side of the abdomen 0.0 oz/week     Comment: 1 Lopez Lite at dinner       REVIEW OF SYSTEMS:   GENERAL HEALTH: feels well otherwise  SKIN: denies any unusual skin lesions or rashes  HEENT no congestion no runny nose no sore throat  Neck no neck pain  RESPIRATORY: denies shor PANCREAS:  No lesion, fluid collection, ductal dilatation, or atrophy. SPLEEN:  No enlargement or focal lesion.     KIDNEYS:  The 8th right kidney is normal.  There is a mildly obstructing 3 x 3 mm calculus at the region of the left UVJ with some perin 1.30 mg/dL   BUN/CREA Ratio 16.8 10.0 - 20.0   Calcium, Total 8.9 8.3 - 10.3 mg/dL   Calculated Osmolality 286 275 - 295 mOsm/kg   GFR, Non-African American 63 >=60   GFR, -American 73 >=60   AST 26 15 - 41 U/L   Alt 27 17 - 63 U/L   Alkaline Phosph %   RDW-SD 39.6 35.1 - 46.3 fL   Neutrophil Absolute Prelim 8.75 (H) 1.30 - 6.70 x10 (3) uL   Neutrophil Absolute 8.75 (H) 1.30 - 6.70 x10(3) uL   Lymphocyte Absolute 1.03 0.90 - 4.00 x10(3) uL   Monocyte Absolute 0.96 0.10 - 1.00 x10(3) uL   Eosinophil Ab

## 2018-08-31 NOTE — ED INITIAL ASSESSMENT (HPI)
PT to the ER with c./o L lower abd pain that began 2 nights ago, gradually worsening. No nausea or vomiting. Pt states thought he was constipated, took stool softners and did have bowel movements with no relief in pain. No pain to back or urinary changes.

## 2018-08-31 NOTE — PATIENT INSTRUCTIONS
Continue Flomax daily. Use ketorolac only as needed for pain  Drink plenty of fluids. Walking stairs might also help. Strain urine and bring kidney stone for analysis if that is possible. Healthy diet.

## 2018-08-31 NOTE — ED PROVIDER NOTES
Patient Seen in: [facility] Emergency Department    History   Patient presents with:  Abdomen/Flank Pain (GI/)    Stated Complaint: abd pain     HPI    Patient is a 13-year-old male who presents emergency department complaining of left lower quadr LLC        Smoking status: Never Smoker                                                              Smokeless tobacco: Never Used                      Alcohol use: Yes           0.0 oz/week     Comment: 1 Lopez Lite at dinner      Review of Systems    Po 1.047 (*)     Blood Urine Large (*)     Leukocyte Esterase Urine Trace (*)     WBC Urine 5-10 (*)     RBC URINE >10 (*)     Mucous Urine 1+ (*)     All other components within normal limits   CBC W/ DIFFERENTIAL - Abnormal; Notable for the following:     N pm    Follow-up:  MD [facility] [address] [phone]    In 1 day      [facility], [address], [place]  [address] [facility] [address] [phone]    Schedule an appointment as soon as possible for a v

## 2018-09-07 ENCOUNTER — OFFICE VISIT (OUTPATIENT)
Dept: FAMILY MEDICINE CLINIC | Facility: CLINIC | Age: 78
End: 2018-09-07

## 2018-09-07 VITALS
HEIGHT: 71 IN | DIASTOLIC BLOOD PRESSURE: 74 MMHG | HEART RATE: 59 BPM | WEIGHT: 212 LBS | RESPIRATION RATE: 16 BRPM | BODY MASS INDEX: 29.68 KG/M2 | TEMPERATURE: 97 F | OXYGEN SATURATION: 97 % | SYSTOLIC BLOOD PRESSURE: 132 MMHG

## 2018-09-07 DIAGNOSIS — N20.0 LEFT NEPHROLITHIASIS: ICD-10-CM

## 2018-09-07 DIAGNOSIS — N23 RENAL COLIC ON LEFT SIDE: Primary | ICD-10-CM

## 2018-09-07 DIAGNOSIS — R31.9 HEMATURIA, UNSPECIFIED TYPE: ICD-10-CM

## 2018-09-07 LAB
APPEARANCE: CLEAR
MULTISTIX LOT#: ABNORMAL NUMERIC
PH, URINE: 5.5 (ref 4.5–8)
SPECIFIC GRAVITY: 1.02 (ref 1–1.03)
URINE-COLOR: YELLOW
UROBILINOGEN,SEMI-QN: 0.2 MG/DL (ref 0–1.9)

## 2018-09-07 PROCEDURE — 96372 THER/PROPH/DIAG INJ SC/IM: CPT | Performed by: FAMILY MEDICINE

## 2018-09-07 PROCEDURE — 81003 URINALYSIS AUTO W/O SCOPE: CPT | Performed by: FAMILY MEDICINE

## 2018-09-07 PROCEDURE — 99214 OFFICE O/P EST MOD 30 MIN: CPT | Performed by: FAMILY MEDICINE

## 2018-09-07 RX ORDER — TAMSULOSIN HYDROCHLORIDE 0.4 MG/1
0.4 CAPSULE ORAL DAILY
Qty: 30 CAPSULE | Refills: 0 | Status: SHIPPED | OUTPATIENT
Start: 2018-09-07 | End: 2018-10-07

## 2018-09-07 RX ORDER — IBUPROFEN 600 MG/1
600 TABLET ORAL EVERY 6 HOURS PRN
Qty: 30 TABLET | Refills: 0 | Status: SHIPPED | OUTPATIENT
Start: 2018-09-07 | End: 2018-12-17 | Stop reason: ALTCHOICE

## 2018-09-07 RX ORDER — KETOROLAC TROMETHAMINE 30 MG/ML
30 INJECTION, SOLUTION INTRAMUSCULAR; INTRAVENOUS ONCE
Status: COMPLETED | OUTPATIENT
Start: 2018-09-07 | End: 2018-09-07

## 2018-09-07 RX ADMIN — KETOROLAC TROMETHAMINE 30 MG: 30 INJECTION, SOLUTION INTRAMUSCULAR; INTRAVENOUS at 10:30:00

## 2018-09-07 NOTE — PROGRESS NOTES
Chuy Tucker is a 69year old male. cc low back pain left side blood in the urine kidney stone  HPI:   Patient is coming to the office complaining of having pain on the left side of the lower back. Started last night.   He went to the bathroom today a flank/back pain  Psych normal mood   NEURO: denies headaches    EXAM:   /74 (BP Location: Left arm, Patient Position: Sitting, Cuff Size: adult)   Pulse 59   Temp 97.4 °F (36.3 °C) (Oral)   Resp 16   Ht 71\"   Wt 212 lb   SpO2 97%   BMI 29.57 kg/m² ibuprofen 600 MG Oral Tab 30 tablet 0      Sig: Take 1 tablet (600 mg total) by mouth every 6 (six) hours as needed for Pain. Keep good hydration. Flomax 0.4 mg (tamsulosin) take once a day.   Ibuprofen 600 mg 1 tablet every 6 hours as needed for jeremy

## 2018-09-07 NOTE — PATIENT INSTRUCTIONS
Keep good hydration. Flomax 0.4 mg (tamsulosin) take once a day. Ibuprofen 600 mg 1 tablet every 6 hours as needed for pain. Stop taking your aspirin when you take ibuprofen. Strain urine. Bring stone for kidney stone analysis.   Call with update in 1

## 2018-09-10 ENCOUNTER — LAB ENCOUNTER (OUTPATIENT)
Dept: LAB | Age: 78
End: 2018-09-10
Attending: FAMILY MEDICINE
Payer: MEDICARE

## 2018-09-10 DIAGNOSIS — N20.0 KIDNEY STONE: Primary | ICD-10-CM

## 2018-09-10 PROCEDURE — 82365 CALCULUS SPECTROSCOPY: CPT

## 2018-09-13 LAB — CALCULI MASS: 68 MG

## 2018-12-17 ENCOUNTER — APPOINTMENT (OUTPATIENT)
Dept: LAB | Age: 78
End: 2018-12-17
Attending: FAMILY MEDICINE
Payer: MEDICARE

## 2018-12-17 ENCOUNTER — OFFICE VISIT (OUTPATIENT)
Dept: FAMILY MEDICINE CLINIC | Facility: CLINIC | Age: 78
End: 2018-12-17

## 2018-12-17 VITALS
HEIGHT: 71 IN | BODY MASS INDEX: 31.22 KG/M2 | WEIGHT: 223 LBS | HEART RATE: 73 BPM | TEMPERATURE: 98 F | SYSTOLIC BLOOD PRESSURE: 118 MMHG | RESPIRATION RATE: 16 BRPM | DIASTOLIC BLOOD PRESSURE: 80 MMHG

## 2018-12-17 DIAGNOSIS — I25.10 ATHEROSCLEROSIS OF NATIVE CORONARY ARTERY OF NATIVE HEART WITHOUT ANGINA PECTORIS: ICD-10-CM

## 2018-12-17 DIAGNOSIS — E11.9 TYPE 2 DIABETES MELLITUS WITHOUT COMPLICATION, WITHOUT LONG-TERM CURRENT USE OF INSULIN (HCC): ICD-10-CM

## 2018-12-17 DIAGNOSIS — I70.0 ATHEROSCLEROSIS OF ABDOMINAL AORTA (HCC): ICD-10-CM

## 2018-12-17 DIAGNOSIS — J45.20 MILD INTERMITTENT ASTHMA WITHOUT COMPLICATION: ICD-10-CM

## 2018-12-17 DIAGNOSIS — E11.9 TYPE 2 DIABETES MELLITUS WITHOUT COMPLICATION, WITHOUT LONG-TERM CURRENT USE OF INSULIN (HCC): Primary | ICD-10-CM

## 2018-12-17 PROCEDURE — 90653 IIV ADJUVANT VACCINE IM: CPT | Performed by: FAMILY MEDICINE

## 2018-12-17 PROCEDURE — 80061 LIPID PANEL: CPT

## 2018-12-17 PROCEDURE — 80053 COMPREHEN METABOLIC PANEL: CPT

## 2018-12-17 PROCEDURE — 83036 HEMOGLOBIN GLYCOSYLATED A1C: CPT

## 2018-12-17 PROCEDURE — G0008 ADMIN INFLUENZA VIRUS VAC: HCPCS | Performed by: FAMILY MEDICINE

## 2018-12-17 PROCEDURE — 36415 COLL VENOUS BLD VENIPUNCTURE: CPT

## 2018-12-17 PROCEDURE — 99214 OFFICE O/P EST MOD 30 MIN: CPT | Performed by: FAMILY MEDICINE

## 2018-12-17 RX ORDER — NYSTATIN 100000 U/G
1 CREAM TOPICAL 3 TIMES DAILY
Qty: 30 G | Refills: 1 | Status: SHIPPED | OUTPATIENT
Start: 2018-12-17 | End: 2019-04-03 | Stop reason: ALTCHOICE

## 2018-12-17 RX ORDER — ROSUVASTATIN CALCIUM 5 MG/1
5 TABLET, COATED ORAL NIGHTLY
Qty: 30 TABLET | Refills: 3 | Status: SHIPPED | OUTPATIENT
Start: 2018-12-17 | End: 2019-03-18

## 2018-12-17 NOTE — PROGRESS NOTES
Chuy Tucker is a 69year old male. cc abnormal ultrafast heart scan results, diabetes, asthma,  HPI:   Patient is coming for follow-up of diabetes. He had eye examination done this year denies any numbness or tingling of the feet.   Last hemoglobin A Smoking status: Never Smoker      Smokeless tobacco: Never Used    Alcohol use:  Yes      Alcohol/week: 0.0 oz      Comment: 1 Lopez Lite at dinner    Drug use: No       REVIEW OF SYSTEMS:   GENERAL HEALTH: feels well otherwise  SKIN: denies any unusual year.  Denies any numbness or tingling of the feet    Asthma mild intermittent patient does not want to use any medications.   Denies any shortness of breath no wheezing monitor,     Atherosclerosis of the abdominal aorta start cholesterol-lowering medicati

## 2018-12-17 NOTE — PATIENT INSTRUCTIONS
Start rosuvastatin 5 mg 1 tablet daily this medication to lower cholesterol levels. Start nystatin cream apply 3 times per day until rash/redness completely go away. Use hydrocortisone 1% over-the-counter at bedtime to rash. Low carbohydrate diet.   Keep

## 2019-01-02 ENCOUNTER — HOSPITAL ENCOUNTER (OUTPATIENT)
Dept: CARDIOLOGY CLINIC | Facility: HOSPITAL | Age: 79
Discharge: HOME | End: 2019-01-02
Attending: FAMILY MEDICINE

## 2019-01-02 DIAGNOSIS — Z13.9 ENCOUNTER FOR SCREENING: ICD-10-CM

## 2019-01-11 ENCOUNTER — TELEPHONE (OUTPATIENT)
Dept: FAMILY MEDICINE CLINIC | Facility: CLINIC | Age: 79
End: 2019-01-11

## 2019-01-11 NOTE — TELEPHONE ENCOUNTER
Pt called. He's looking for a refill on Rosuvastatin. I stated to have the pharmacy call us so that we can put his refill through. However, pt would like a call back because he wants to know if he should continue or discontinue the medication.   Please c

## 2019-01-11 NOTE — TELEPHONE ENCOUNTER
Record reviewed-last lipid lab done 12/17/18 w the following instructions from dr Mora Model 12/17/18-and noted as provided to pt 12/21/18:    \" Cholesterol numbers came back normal however because of the plaques in patient's coronary arteries from [place]

## 2019-03-01 VITALS
HEIGHT: 71 IN | DIASTOLIC BLOOD PRESSURE: 78 MMHG | WEIGHT: 221 LBS | BODY MASS INDEX: 30.94 KG/M2 | HEART RATE: 72 BPM | SYSTOLIC BLOOD PRESSURE: 146 MMHG

## 2019-03-18 RX ORDER — ROSUVASTATIN CALCIUM 5 MG/1
5 TABLET, COATED ORAL NIGHTLY
Qty: 30 TABLET | Refills: 0 | Status: SHIPPED | OUTPATIENT
Start: 2019-03-18 | End: 2019-03-26

## 2019-03-22 RX ORDER — ROSUVASTATIN CALCIUM 5 MG/1
5 TABLET, COATED ORAL NIGHTLY
Qty: 30 TABLET | Refills: 2 | OUTPATIENT
Start: 2019-03-22

## 2019-03-26 RX ORDER — ROSUVASTATIN CALCIUM 5 MG/1
5 TABLET, COATED ORAL NIGHTLY
Qty: 90 TABLET | Refills: 0 | Status: SHIPPED | OUTPATIENT
Start: 2019-03-26 | End: 2019-07-13

## 2019-04-01 ENCOUNTER — APPOINTMENT (OUTPATIENT)
Dept: LAB | Age: 79
End: 2019-04-01
Attending: FAMILY MEDICINE
Payer: MEDICARE

## 2019-04-01 DIAGNOSIS — E11.9 TYPE 2 DIABETES MELLITUS WITHOUT COMPLICATION, WITHOUT LONG-TERM CURRENT USE OF INSULIN (HCC): ICD-10-CM

## 2019-04-01 PROCEDURE — 82043 UR ALBUMIN QUANTITATIVE: CPT

## 2019-04-01 PROCEDURE — 83036 HEMOGLOBIN GLYCOSYLATED A1C: CPT

## 2019-04-01 PROCEDURE — 80061 LIPID PANEL: CPT

## 2019-04-01 PROCEDURE — 80053 COMPREHEN METABOLIC PANEL: CPT

## 2019-04-01 PROCEDURE — 82570 ASSAY OF URINE CREATININE: CPT

## 2019-04-01 PROCEDURE — 36415 COLL VENOUS BLD VENIPUNCTURE: CPT

## 2019-04-03 ENCOUNTER — OFFICE VISIT (OUTPATIENT)
Dept: FAMILY MEDICINE CLINIC | Facility: CLINIC | Age: 79
End: 2019-04-03
Payer: MEDICARE

## 2019-04-03 VITALS
HEIGHT: 71 IN | TEMPERATURE: 97 F | DIASTOLIC BLOOD PRESSURE: 60 MMHG | SYSTOLIC BLOOD PRESSURE: 134 MMHG | WEIGHT: 223 LBS | HEART RATE: 66 BPM | BODY MASS INDEX: 31.22 KG/M2 | RESPIRATION RATE: 18 BRPM

## 2019-04-03 DIAGNOSIS — E11.59 TYPE 2 DIABETES MELLITUS WITH OTHER CIRCULATORY COMPLICATIONS (HCC): ICD-10-CM

## 2019-04-03 DIAGNOSIS — I25.10 ATHEROSCLEROSIS OF NATIVE CORONARY ARTERY OF NATIVE HEART WITHOUT ANGINA PECTORIS: ICD-10-CM

## 2019-04-03 DIAGNOSIS — J45.20 MILD INTERMITTENT ASTHMA, UNSPECIFIED WHETHER COMPLICATED: ICD-10-CM

## 2019-04-03 DIAGNOSIS — R06.02 SHORTNESS OF BREATH: ICD-10-CM

## 2019-04-03 DIAGNOSIS — E11.9 DIABETES MELLITUS TYPE 2 WITHOUT RETINOPATHY (HCC): Primary | ICD-10-CM

## 2019-04-03 DIAGNOSIS — I70.0 ATHEROSCLEROSIS OF ABDOMINAL AORTA (HCC): ICD-10-CM

## 2019-04-03 DIAGNOSIS — G47.9 SLEEP DIFFICULTIES: ICD-10-CM

## 2019-04-03 PROBLEM — J44.89 ASTHMA WITH COPD (CHRONIC OBSTRUCTIVE PULMONARY DISEASE) (HCC): Chronic | Status: ACTIVE | Noted: 2019-04-03

## 2019-04-03 PROBLEM — J44.9 ASTHMA WITH COPD (CHRONIC OBSTRUCTIVE PULMONARY DISEASE) (HCC): Chronic | Status: RESOLVED | Noted: 2019-04-03 | Resolved: 2019-04-03

## 2019-04-03 PROBLEM — J44.89 ASTHMA WITH COPD (CHRONIC OBSTRUCTIVE PULMONARY DISEASE): Chronic | Status: ACTIVE | Noted: 2019-04-03

## 2019-04-03 PROBLEM — J44.89 ASTHMA WITH COPD (CHRONIC OBSTRUCTIVE PULMONARY DISEASE) (HCC): Chronic | Status: RESOLVED | Noted: 2019-04-03 | Resolved: 2019-04-03

## 2019-04-03 PROBLEM — J44.89 ASTHMA WITH COPD (CHRONIC OBSTRUCTIVE PULMONARY DISEASE): Chronic | Status: RESOLVED | Noted: 2019-04-03 | Resolved: 2019-04-03

## 2019-04-03 PROBLEM — J44.9 ASTHMA WITH COPD (CHRONIC OBSTRUCTIVE PULMONARY DISEASE) (HCC): Chronic | Status: ACTIVE | Noted: 2019-04-03

## 2019-04-03 PROCEDURE — 99214 OFFICE O/P EST MOD 30 MIN: CPT | Performed by: FAMILY MEDICINE

## 2019-04-03 RX ORDER — ALBUTEROL SULFATE 90 UG/1
2 AEROSOL, METERED RESPIRATORY (INHALATION) EVERY 4 HOURS PRN
Qty: 1 INHALER | Refills: 0 | Status: SHIPPED | OUTPATIENT
Start: 2019-04-03 | End: 2021-01-14 | Stop reason: ALTCHOICE

## 2019-04-03 RX ORDER — BUDESONIDE AND FORMOTEROL FUMARATE DIHYDRATE 160; 4.5 UG/1; UG/1
1 AEROSOL RESPIRATORY (INHALATION) 2 TIMES DAILY
Qty: 1 INHALER | Refills: 0 | COMMUNITY
Start: 2019-04-03 | End: 2020-01-22 | Stop reason: ALTCHOICE

## 2019-04-03 NOTE — PROGRESS NOTES
Chuy Rich is a 69year old male. cc abnormal ultrafast heart scan results, diabetes, asthma,  HPI:   Patient is coming for follow-up of diabetes. He had eye examination done this year denies any numbness or tingling of the feet.   Last hemoglobin A (four) hours as needed for Wheezing or Shortness of Breath. Disp: 1 Inhaler Rfl: 0   Rosuvastatin Calcium 5 MG Oral Tab Take 1 tablet (5 mg total) by mouth nightly. Disp: 90 tablet Rfl: 0   aspirin 81 MG Oral Tab Take 81 mg by mouth daily.  Disp:  Rfl:    M normal mood       Results for orders placed or performed in visit on 04/01/19   LIPID PANEL   Result Value Ref Range    Cholesterol, Total 105 <200 mg/dL    HDL Cholesterol 42 40 - 59 mg/dL    Triglycerides 105 30 - 149 mg/dL    LDL Cholesterol 42 <100 mg/ intermittent patient does not want to use any medications.   Denies any shortness of breath no wheezing monitor,     Atherosclerosis of the abdominal aorta start cholesterol-lowering medication continue aspirin     Atherosclerosis of the coronary arteries s & Consults:  OPHTHALMOLOGY - INTERNAL    The patient indicates understanding of these issues and agrees to the plan. The patient is asked to return in May/June for super visit. Follow-up in 2 weeks for shortness of breath follow-up.

## 2019-04-03 NOTE — PATIENT INSTRUCTIONS
Start Symbicort 1 puff twice a day with using AeroChamber. Continue other medications. Pro Air HFA inhaler 2 puffs every 4-6 hours as needed before doing work/exercises. Low carbohydrate diet less bread, less pasta, less rice less potatoes less sweets.

## 2019-04-17 ENCOUNTER — LAB ENCOUNTER (OUTPATIENT)
Dept: LAB | Age: 79
End: 2019-04-17
Attending: FAMILY MEDICINE
Payer: MEDICARE

## 2019-04-17 ENCOUNTER — HOSPITAL ENCOUNTER (OUTPATIENT)
Dept: GENERAL RADIOLOGY | Age: 79
Discharge: HOME | End: 2019-04-17
Attending: FAMILY MEDICINE
Payer: MEDICARE

## 2019-04-17 ENCOUNTER — OFFICE VISIT (OUTPATIENT)
Dept: FAMILY MEDICINE CLINIC | Facility: CLINIC | Age: 79
End: 2019-04-17
Payer: MEDICARE

## 2019-04-17 VITALS
HEIGHT: 71 IN | SYSTOLIC BLOOD PRESSURE: 122 MMHG | TEMPERATURE: 97 F | HEART RATE: 101 BPM | DIASTOLIC BLOOD PRESSURE: 68 MMHG | RESPIRATION RATE: 18 BRPM | BODY MASS INDEX: 30.66 KG/M2 | WEIGHT: 219 LBS | OXYGEN SATURATION: 95 %

## 2019-04-17 DIAGNOSIS — I49.1 PAC (PREMATURE ATRIAL CONTRACTION): ICD-10-CM

## 2019-04-17 DIAGNOSIS — I25.10 ATHEROSCLEROSIS OF NATIVE CORONARY ARTERY OF NATIVE HEART WITHOUT ANGINA PECTORIS: ICD-10-CM

## 2019-04-17 DIAGNOSIS — R06.02 SHORTNESS OF BREATH: Primary | ICD-10-CM

## 2019-04-17 DIAGNOSIS — R61 EXCESSIVE SWEATING: ICD-10-CM

## 2019-04-17 DIAGNOSIS — J44.9 ASTHMA WITH COPD (CHRONIC OBSTRUCTIVE PULMONARY DISEASE) (HCC): ICD-10-CM

## 2019-04-17 DIAGNOSIS — R06.02 SHORTNESS OF BREATH: ICD-10-CM

## 2019-04-17 DIAGNOSIS — E11.59 TYPE 2 DIABETES MELLITUS WITH OTHER CIRCULATORY COMPLICATIONS (HCC): ICD-10-CM

## 2019-04-17 PROBLEM — J44.89 ASTHMA WITH COPD (CHRONIC OBSTRUCTIVE PULMONARY DISEASE): Chronic | Status: ACTIVE | Noted: 2019-04-17

## 2019-04-17 PROBLEM — J44.89 ASTHMA WITH COPD (CHRONIC OBSTRUCTIVE PULMONARY DISEASE) (HCC): Chronic | Status: ACTIVE | Noted: 2019-04-17

## 2019-04-17 PROCEDURE — 36415 COLL VENOUS BLD VENIPUNCTURE: CPT

## 2019-04-17 PROCEDURE — 84443 ASSAY THYROID STIM HORMONE: CPT

## 2019-04-17 PROCEDURE — 93000 ELECTROCARDIOGRAM COMPLETE: CPT | Performed by: FAMILY MEDICINE

## 2019-04-17 PROCEDURE — 99215 OFFICE O/P EST HI 40 MIN: CPT | Performed by: FAMILY MEDICINE

## 2019-04-17 PROCEDURE — 71046 X-RAY EXAM CHEST 2 VIEWS: CPT | Performed by: FAMILY MEDICINE

## 2019-04-17 PROCEDURE — 83880 ASSAY OF NATRIURETIC PEPTIDE: CPT

## 2019-04-17 PROCEDURE — 94640 AIRWAY INHALATION TREATMENT: CPT | Performed by: FAMILY MEDICINE

## 2019-04-17 PROCEDURE — 85025 COMPLETE CBC W/AUTO DIFF WBC: CPT

## 2019-04-17 RX ORDER — ALBUTEROL SULFATE 2.5 MG/3ML
2.5 SOLUTION RESPIRATORY (INHALATION) ONCE
Status: COMPLETED | OUTPATIENT
Start: 2019-04-17 | End: 2019-04-17

## 2019-04-17 RX ORDER — BUDESONIDE AND FORMOTEROL FUMARATE DIHYDRATE 160; 4.5 UG/1; UG/1
2 AEROSOL RESPIRATORY (INHALATION) 2 TIMES DAILY
Qty: 1 INHALER | Refills: 2 | Status: SHIPPED | OUTPATIENT
Start: 2019-04-17 | End: 2019-06-12

## 2019-04-17 RX ADMIN — ALBUTEROL SULFATE 2.5 MG: 2.5 SOLUTION RESPIRATORY (INHALATION) at 11:01:00

## 2019-04-17 NOTE — PROGRESS NOTES
Chuy Snider is a 69year old male. cc shortness of breath, coronary atherosclerosis, asthma, diabetes, hypercholesterolemia,  HPI:   Patient is come to the office for evaluation of the shortness of breath. His symptoms started long time ago.   Annel Albuterol Sulfate HFA (PROAIR HFA) 108 (90 Base) MCG/ACT Inhalation Aero Soln Inhale 2 puffs into the lungs every 4 (four) hours as needed for Wheezing or Shortness of Breath.  Disp: 1 Inhaler Rfl: 0      Past Medical History:   Diagnosis Date   • Mild in sweating  Pac (premature atrial contraction)  Asthma with copd (chronic obstructive pulmonary disease) (hcc)  Type 2 diabetes mellitus with other circulatory complications ([facility])    Shortness of breath patient was giving Symbicort inhaler during last visit came back at 7.0. We will continue monitoring continue management as recommended during last visit.   Orders Placed This Encounter      CBC W DIFFERENTIAL W PLATELET      TSH W REFLEX TO FREE T4      Pro Beta Natriuretic Peptide [E]      Meds & Refills for

## 2019-04-17 NOTE — PATIENT INSTRUCTIONS
Call [phone] to schedule chest x-ray if not done today. We will call your blood test results when those are back. Call [phone] to schedule echo of the heart , stress test,  and Holter monitor.     Schedule an appointment with Dr. Prema Isaac for evalu

## 2019-04-18 PROBLEM — J45.20 MILD INTERMITTENT ASTHMA WITHOUT COMPLICATION (HCC): Status: RESOLVED | Noted: 2017-09-11 | Resolved: 2019-04-18

## 2019-04-18 PROBLEM — J45.20 MILD INTERMITTENT ASTHMA WITHOUT COMPLICATION: Status: RESOLVED | Noted: 2017-09-11 | Resolved: 2019-04-18

## 2019-05-08 ENCOUNTER — HOSPITAL ENCOUNTER (OUTPATIENT)
Dept: CV DIAGNOSTICS | Facility: HOSPITAL | Age: 79
Discharge: HOME | End: 2019-05-08
Attending: FAMILY MEDICINE
Payer: MEDICARE

## 2019-05-08 DIAGNOSIS — R06.02 SHORTNESS OF BREATH: ICD-10-CM

## 2019-05-08 DIAGNOSIS — I25.10 ATHEROSCLEROSIS OF NATIVE CORONARY ARTERY OF NATIVE HEART WITHOUT ANGINA PECTORIS: ICD-10-CM

## 2019-05-08 DIAGNOSIS — I49.1 PAC (PREMATURE ATRIAL CONTRACTION): ICD-10-CM

## 2019-05-08 DIAGNOSIS — R61 EXCESSIVE SWEATING: ICD-10-CM

## 2019-05-08 PROCEDURE — 78452 HT MUSCLE IMAGE SPECT MULT: CPT | Performed by: FAMILY MEDICINE

## 2019-05-08 PROCEDURE — 93306 TTE W/DOPPLER COMPLETE: CPT | Performed by: FAMILY MEDICINE

## 2019-05-08 PROCEDURE — 93226 XTRNL ECG REC<48 HR SCAN A/R: CPT | Performed by: FAMILY MEDICINE

## 2019-05-08 PROCEDURE — 93227 XTRNL ECG REC<48 HR R&I: CPT | Performed by: FAMILY MEDICINE

## 2019-05-08 PROCEDURE — 93225 XTRNL ECG REC<48 HRS REC: CPT | Performed by: FAMILY MEDICINE

## 2019-05-08 PROCEDURE — 93018 CV STRESS TEST I&R ONLY: CPT | Performed by: FAMILY MEDICINE

## 2019-05-08 PROCEDURE — 93017 CV STRESS TEST TRACING ONLY: CPT | Performed by: FAMILY MEDICINE

## 2019-05-28 ENCOUNTER — OFFICE VISIT (OUTPATIENT)
Dept: CARDIOLOGY | Age: 79
End: 2019-05-28

## 2019-05-28 VITALS
HEIGHT: 71 IN | HEART RATE: 72 BPM | WEIGHT: 220 LBS | SYSTOLIC BLOOD PRESSURE: 110 MMHG | DIASTOLIC BLOOD PRESSURE: 62 MMHG | BODY MASS INDEX: 30.8 KG/M2

## 2019-05-28 DIAGNOSIS — I25.10 CORONARY ARTERY CALCIFICATION SEEN ON CT SCAN: Primary | ICD-10-CM

## 2019-05-28 PROCEDURE — 99214 OFFICE O/P EST MOD 30 MIN: CPT | Performed by: INTERNAL MEDICINE

## 2019-05-28 RX ORDER — BUDESONIDE AND FORMOTEROL FUMARATE DIHYDRATE 160; 4.5 UG/1; UG/1
2 AEROSOL RESPIRATORY (INHALATION)
COMMUNITY
Start: 2019-04-03

## 2019-05-28 RX ORDER — ALBUTEROL SULFATE 90 UG/1
2 AEROSOL, METERED RESPIRATORY (INHALATION)
COMMUNITY
Start: 2019-04-03

## 2019-05-28 RX ORDER — ROSUVASTATIN CALCIUM 5 MG/1
5 TABLET, COATED ORAL
COMMUNITY
Start: 2019-03-26

## 2019-06-03 ENCOUNTER — TELEPHONE (OUTPATIENT)
Dept: FAMILY MEDICINE CLINIC | Facility: CLINIC | Age: 79
End: 2019-06-03

## 2019-06-03 NOTE — TELEPHONE ENCOUNTER
Pt called. He has a question about his Rosuvastatin medication.   Pt stated that he would like to Dr Ivis Infante.  I stated that I will have a nurse call him back once they speak with Dr Ivis Infante.

## 2019-06-03 NOTE — TELEPHONE ENCOUNTER
Below first day should say \"Thursday\". Pt advised of recommendations. He voiced understanding and agrees to try. Will update us on the myalgias.

## 2019-06-03 NOTE — TELEPHONE ENCOUNTER
May be trying to take rosuvastatin only twice a week like on Monday and first day and see   how much of the muscle aches he would have. Hope that dosing will be better tolerated.

## 2019-06-03 NOTE — TELEPHONE ENCOUNTER
Per pt since he started Rosuvastatin few months ago, he has been having B/L hands and legs muscle cramping off and on all day. States since he d/c'd x 1 week ago, it has lessened. Please advise of what's next per pt.

## 2019-06-11 ENCOUNTER — MA CHART PREP (OUTPATIENT)
Dept: FAMILY MEDICINE CLINIC | Facility: CLINIC | Age: 79
End: 2019-06-11

## 2019-06-11 NOTE — TELEPHONE ENCOUNTER
Chart reviewed as MA Chart Prep for upcoming Medicare Advantage visit scheduled for 6/12/2019. No new potential HCC diagnoses identified.

## 2019-06-12 ENCOUNTER — OFFICE VISIT (OUTPATIENT)
Dept: FAMILY MEDICINE CLINIC | Facility: CLINIC | Age: 79
End: 2019-06-12
Payer: MEDICARE

## 2019-06-12 VITALS
HEART RATE: 72 BPM | SYSTOLIC BLOOD PRESSURE: 128 MMHG | TEMPERATURE: 98 F | OXYGEN SATURATION: 96 % | RESPIRATION RATE: 18 BRPM | WEIGHT: 215 LBS | BODY MASS INDEX: 30.1 KG/M2 | HEIGHT: 71 IN | DIASTOLIC BLOOD PRESSURE: 80 MMHG

## 2019-06-12 DIAGNOSIS — K76.0 FATTY LIVER: ICD-10-CM

## 2019-06-12 DIAGNOSIS — Z96.659 STATUS POST TOTAL KNEE REPLACEMENT, UNSPECIFIED LATERALITY: ICD-10-CM

## 2019-06-12 DIAGNOSIS — D31.31 CHOROIDAL NEVUS OF RIGHT EYE: ICD-10-CM

## 2019-06-12 DIAGNOSIS — I25.10 ATHEROSCLEROSIS OF NATIVE CORONARY ARTERY OF NATIVE HEART WITHOUT ANGINA PECTORIS: ICD-10-CM

## 2019-06-12 DIAGNOSIS — M17.10 PRIMARY LOCALIZED OSTEOARTHROSIS OF LOWER LEG, UNSPECIFIED LATERALITY: ICD-10-CM

## 2019-06-12 DIAGNOSIS — J44.9 ASTHMA WITH COPD (CHRONIC OBSTRUCTIVE PULMONARY DISEASE) (HCC): Chronic | ICD-10-CM

## 2019-06-12 DIAGNOSIS — I70.0 ATHEROSCLEROSIS OF ABDOMINAL AORTA (HCC): ICD-10-CM

## 2019-06-12 DIAGNOSIS — N20.0 LEFT NEPHROLITHIASIS: ICD-10-CM

## 2019-06-12 DIAGNOSIS — Z12.5 SCREENING PSA (PROSTATE SPECIFIC ANTIGEN): ICD-10-CM

## 2019-06-12 DIAGNOSIS — G47.9 SLEEP DIFFICULTIES: ICD-10-CM

## 2019-06-12 DIAGNOSIS — Z86.010 HISTORY OF ADENOMATOUS POLYP OF COLON: ICD-10-CM

## 2019-06-12 DIAGNOSIS — Z00.00 ENCOUNTER FOR ANNUAL HEALTH EXAMINATION: Primary | ICD-10-CM

## 2019-06-12 DIAGNOSIS — Z96.1 PSEUDOPHAKIA: ICD-10-CM

## 2019-06-12 DIAGNOSIS — J45.30 MILD PERSISTENT ASTHMA WITHOUT COMPLICATION: ICD-10-CM

## 2019-06-12 DIAGNOSIS — E66.9 OBESITY (BMI 30.0-34.9): ICD-10-CM

## 2019-06-12 DIAGNOSIS — E11.59 TYPE 2 DIABETES MELLITUS WITH OTHER CIRCULATORY COMPLICATIONS (HCC): ICD-10-CM

## 2019-06-12 DIAGNOSIS — E11.9 DIABETES MELLITUS TYPE 2 WITHOUT RETINOPATHY (HCC): ICD-10-CM

## 2019-06-12 DIAGNOSIS — R06.02 SHORTNESS OF BREATH: ICD-10-CM

## 2019-06-12 PROBLEM — J45.20 MILD INTERMITTENT ASTHMA (HCC): Status: RESOLVED | Noted: 2019-04-03 | Resolved: 2019-06-12

## 2019-06-12 PROBLEM — J45.20 MILD INTERMITTENT ASTHMA: Status: RESOLVED | Noted: 2019-04-03 | Resolved: 2019-06-12

## 2019-06-12 PROCEDURE — G0439 PPPS, SUBSEQ VISIT: HCPCS | Performed by: FAMILY MEDICINE

## 2019-06-12 PROCEDURE — 99397 PER PM REEVAL EST PAT 65+ YR: CPT | Performed by: FAMILY MEDICINE

## 2019-06-12 PROCEDURE — 96160 PT-FOCUSED HLTH RISK ASSMT: CPT | Performed by: FAMILY MEDICINE

## 2019-06-12 RX ORDER — BUDESONIDE AND FORMOTEROL FUMARATE DIHYDRATE 160; 4.5 UG/1; UG/1
2 AEROSOL RESPIRATORY (INHALATION) 2 TIMES DAILY
Qty: 1 INHALER | Refills: 5 | Status: SHIPPED | OUTPATIENT
Start: 2019-06-12 | End: 2020-01-22

## 2019-06-12 NOTE — PROGRESS NOTES
HPI:   Chuy Bose is a 69year old male who presents for a MA (Medicare Advantage) Supervisit (Once per calendar year) .        His last annual assessment has been over 1 year: Annual Physical due on 01/25/2017         Patient Care Team: Patient C Unspecified essential hypertension.     He  has a past surgical history that includes appendectomy (1961); other surgical history (1986); knee replacement surgery (2/1/2013); remv cataract extracap,insert lens (Left, 12/14/2015); remv cataract extracap,inse Tolerate Visual Acuity: Yes      General Appearance:  Alert, cooperative, no distress, appears stated age   Head:  Normocephalic, without obvious abnormality, atraumatic   Eyes:  PERRL, conjunctiva/corneas clear, EOM's intact, both eyes   Ears:  Normal TM' examination    Atherosclerosis of native coronary artery of native heart without angina pectoris  -     LIPID PANEL; Future    Type 2 diabetes mellitus with other circulatory complications (HCC)  -     COMP METABOLIC PANEL (14);  Future  -     LIPID PANEL; not as bad as it is working better for him shortness of breath is improving. He is using Symbicort with AeroChamber. Nocturia check PSA monitor symptoms.       Atherosclerosis of the coronary artery disease evaluation by cardiologist was normal [facility] aspirin?: Yes    Have you had any immunizations at another office such as Influenza, Hepatitis B, Tetanus, or Pneumococcal?: No     Functional Ability     Bathing or Showering: Able without help    Toileting: Able without help    Dressing: Able without hel 120 (H)          Cardiovascular Disease Screening     LDL Annually LDL Cholesterol (mg/dL)   Date Value   04/01/2019 42     LDL-CHOLESTEROL (mg/dL (calc))   Date Value   04/29/2016 86        EKG - w/ Initial Preventative Physical Exam only, or if medically Conc  Annually No results found for: DIGOXIN, DIG, VALP No flowsheet data found.     Diabetes      HgbA1C  Annually HEMOGLOBIN A1c (% of total Hgb)   Date Value   04/29/2016 6.8 (H)     HgbA1C (%)   Date Value   04/01/2019 7.0 (H)       No flowsheet data fo

## 2019-06-12 NOTE — PATIENT INSTRUCTIONS
Continue current meds. Watch diet for fats and carbs. Stay active. Do fasting blood work in August and follow-up in office.     Chuy Dietz's SCREENING SCHEDULE   Tests on this list are recommended by your physician but may not be covered, or c service except at the [facility] Visit    Abdominal aortic aneurysm screening (once between ages 73-68)  No results found for this or any previous visit.  Limited to patients who meet one of the following criteria:   • Men who are 73-68 years old a birthday    Hepatitis B for Moderate/High Risk No orders found for this or any previous visit.  Medium/high risk factors:   End-stage renal disease   Hemophiliacs who received Factor VIII or IX concentrates   Clients of institutions for the mentally retarde

## 2019-07-15 RX ORDER — ROSUVASTATIN CALCIUM 5 MG/1
TABLET, COATED ORAL
Qty: 90 TABLET | Refills: 0 | Status: SHIPPED | OUTPATIENT
Start: 2019-07-15 | End: 2019-10-16

## 2019-08-19 ENCOUNTER — APPOINTMENT (OUTPATIENT)
Dept: LAB | Age: 79
End: 2019-08-19
Attending: FAMILY MEDICINE
Payer: MEDICARE

## 2019-08-19 DIAGNOSIS — Z12.5 SCREENING PSA (PROSTATE SPECIFIC ANTIGEN): ICD-10-CM

## 2019-08-19 DIAGNOSIS — E11.59 TYPE 2 DIABETES MELLITUS WITH OTHER CIRCULATORY COMPLICATIONS (HCC): ICD-10-CM

## 2019-08-19 DIAGNOSIS — I25.10 ATHEROSCLEROSIS OF NATIVE CORONARY ARTERY OF NATIVE HEART WITHOUT ANGINA PECTORIS: ICD-10-CM

## 2019-08-19 LAB
ALBUMIN SERPL-MCNC: 3.7 G/DL (ref 3.4–5)
ALBUMIN/GLOB SERPL: 1.2 {RATIO} (ref 1–2)
ALP LIVER SERPL-CCNC: 47 U/L (ref 45–117)
ALT SERPL-CCNC: 30 U/L (ref 16–61)
ANION GAP SERPL CALC-SCNC: 7 MMOL/L (ref 0–18)
AST SERPL-CCNC: 17 U/L (ref 15–37)
BILIRUB SERPL-MCNC: 0.6 MG/DL (ref 0.1–2)
BUN BLD-MCNC: 14 MG/DL (ref 7–18)
BUN/CREAT SERPL: 15.7 (ref 10–20)
CALCIUM BLD-MCNC: 8.7 MG/DL (ref 8.5–10.1)
CHLORIDE SERPL-SCNC: 107 MMOL/L (ref 98–112)
CHOLEST SMN-MCNC: 161 MG/DL (ref ?–200)
CO2 SERPL-SCNC: 26 MMOL/L (ref 21–32)
COMPLEXED PSA SERPL-MCNC: 3.86 NG/ML (ref ?–4)
CREAT BLD-MCNC: 0.89 MG/DL (ref 0.7–1.3)
CREAT UR-SCNC: 154 MG/DL
EST. AVERAGE GLUCOSE BLD GHB EST-MCNC: 154 MG/DL (ref 68–126)
GLOBULIN PLAS-MCNC: 3.2 G/DL (ref 2.8–4.4)
GLUCOSE BLD-MCNC: 134 MG/DL (ref 70–99)
HBA1C MFR BLD HPLC: 7 % (ref ?–5.7)
HDLC SERPL-MCNC: 41 MG/DL (ref 40–59)
LDLC SERPL CALC-MCNC: 94 MG/DL (ref ?–100)
M PROTEIN MFR SERPL ELPH: 6.9 G/DL (ref 6.4–8.2)
MICROALBUMIN UR-MCNC: 1.02 MG/DL
MICROALBUMIN/CREAT 24H UR-RTO: 6.6 UG/MG (ref ?–30)
NONHDLC SERPL-MCNC: 120 MG/DL (ref ?–130)
OSMOLALITY SERPL CALC.SUM OF ELEC: 292 MOSM/KG (ref 275–295)
POTASSIUM SERPL-SCNC: 4 MMOL/L (ref 3.5–5.1)
SODIUM SERPL-SCNC: 140 MMOL/L (ref 136–145)
TRIGL SERPL-MCNC: 128 MG/DL (ref 30–149)
VLDLC SERPL CALC-MCNC: 26 MG/DL (ref 0–30)

## 2019-08-19 PROCEDURE — 82043 UR ALBUMIN QUANTITATIVE: CPT

## 2019-08-19 PROCEDURE — 36415 COLL VENOUS BLD VENIPUNCTURE: CPT

## 2019-08-19 PROCEDURE — 80061 LIPID PANEL: CPT

## 2019-08-19 PROCEDURE — 82570 ASSAY OF URINE CREATININE: CPT

## 2019-08-19 PROCEDURE — 80053 COMPREHEN METABOLIC PANEL: CPT

## 2019-08-19 PROCEDURE — 83036 HEMOGLOBIN GLYCOSYLATED A1C: CPT

## 2019-08-21 ENCOUNTER — OFFICE VISIT (OUTPATIENT)
Dept: FAMILY MEDICINE CLINIC | Facility: CLINIC | Age: 79
End: 2019-08-21
Payer: MEDICARE

## 2019-08-21 VITALS
DIASTOLIC BLOOD PRESSURE: 84 MMHG | HEIGHT: 71 IN | SYSTOLIC BLOOD PRESSURE: 126 MMHG | RESPIRATION RATE: 16 BRPM | WEIGHT: 216 LBS | BODY MASS INDEX: 30.24 KG/M2 | TEMPERATURE: 98 F | HEART RATE: 100 BPM

## 2019-08-21 DIAGNOSIS — J44.9 ASTHMA WITH COPD (CHRONIC OBSTRUCTIVE PULMONARY DISEASE) (HCC): Chronic | ICD-10-CM

## 2019-08-21 DIAGNOSIS — I25.10 ATHEROSCLEROSIS OF NATIVE CORONARY ARTERY OF NATIVE HEART WITHOUT ANGINA PECTORIS: ICD-10-CM

## 2019-08-21 DIAGNOSIS — E11.59 TYPE 2 DIABETES MELLITUS WITH OTHER CIRCULATORY COMPLICATIONS (HCC): Primary | ICD-10-CM

## 2019-08-21 DIAGNOSIS — I70.0 ATHEROSCLEROSIS OF ABDOMINAL AORTA (HCC): ICD-10-CM

## 2019-08-21 PROCEDURE — 99214 OFFICE O/P EST MOD 30 MIN: CPT | Performed by: FAMILY MEDICINE

## 2019-08-21 NOTE — PROGRESS NOTES
Chuy Guillaume is a 69year old male. cc diabetes, coronary artery disease,, aortic atherosclerosis, asthma/COPD  HPI:   Patient is coming for follow-up of diabetes. He is not taking any medications. Trying to watch diet. Lost some weight.   Limit westley History:  Social History    Tobacco Use      Smoking status: Never Smoker      Smokeless tobacco: Never Used    Alcohol use:  Yes      Alcohol/week: 0.0 standard drinks      Comment: 1 Lopez Lite at dinner    Drug use: No       REVIEW OF SYSTEMS:   GENERAL Ratio 1.2 1.0 - 2.0   LIPID PANEL   Result Value Ref Range    Cholesterol, Total 161 <200 mg/dL    HDL Cholesterol 41 40 - 59 mg/dL    Triglycerides 128 30 - 149 mg/dL    LDL Cholesterol 94 <100 mg/dL    VLDL 26 0 - 30 mg/dL    Non HDL Chol 120 <130 mg/dL

## 2019-08-21 NOTE — PATIENT INSTRUCTIONS
Continue current meds. Watch diet for fats and carbs. Stay active. Do fasting blood work before visit in January.

## 2019-10-17 RX ORDER — ROSUVASTATIN CALCIUM 5 MG/1
TABLET, COATED ORAL
Qty: 90 TABLET | Refills: 0 | Status: SHIPPED | OUTPATIENT
Start: 2019-10-17 | End: 2020-01-16

## 2020-01-16 RX ORDER — ROSUVASTATIN CALCIUM 5 MG/1
TABLET, COATED ORAL
Qty: 30 TABLET | Refills: 0 | Status: SHIPPED | OUTPATIENT
Start: 2020-01-16 | End: 2020-01-22

## 2020-01-22 ENCOUNTER — OFFICE VISIT (OUTPATIENT)
Dept: FAMILY MEDICINE CLINIC | Facility: CLINIC | Age: 80
End: 2020-01-22
Payer: MEDICARE

## 2020-01-22 ENCOUNTER — APPOINTMENT (OUTPATIENT)
Dept: LAB | Age: 80
End: 2020-01-22
Attending: FAMILY MEDICINE
Payer: MEDICARE

## 2020-01-22 VITALS
DIASTOLIC BLOOD PRESSURE: 58 MMHG | BODY MASS INDEX: 30.1 KG/M2 | TEMPERATURE: 97 F | HEART RATE: 57 BPM | HEIGHT: 71 IN | OXYGEN SATURATION: 97 % | RESPIRATION RATE: 16 BRPM | SYSTOLIC BLOOD PRESSURE: 120 MMHG | WEIGHT: 215 LBS

## 2020-01-22 DIAGNOSIS — E11.9 DIABETES MELLITUS TYPE 2 WITHOUT RETINOPATHY (HCC): ICD-10-CM

## 2020-01-22 DIAGNOSIS — I70.0 ATHEROSCLEROSIS OF ABDOMINAL AORTA (HCC): ICD-10-CM

## 2020-01-22 DIAGNOSIS — I25.10 ATHEROSCLEROSIS OF NATIVE CORONARY ARTERY OF NATIVE HEART WITHOUT ANGINA PECTORIS: ICD-10-CM

## 2020-01-22 DIAGNOSIS — I25.10 CORONARY ARTERY CALCIFICATION SEEN ON CT SCAN: ICD-10-CM

## 2020-01-22 DIAGNOSIS — J44.9 ASTHMA WITH COPD (CHRONIC OBSTRUCTIVE PULMONARY DISEASE) (HCC): ICD-10-CM

## 2020-01-22 DIAGNOSIS — I65.23 BILATERAL CAROTID ARTERY STENOSIS: ICD-10-CM

## 2020-01-22 DIAGNOSIS — E11.59 TYPE 2 DIABETES MELLITUS WITH OTHER CIRCULATORY COMPLICATIONS (HCC): ICD-10-CM

## 2020-01-22 DIAGNOSIS — E11.59 TYPE 2 DIABETES MELLITUS WITH OTHER CIRCULATORY COMPLICATIONS (HCC): Primary | ICD-10-CM

## 2020-01-22 DIAGNOSIS — E66.9 OBESITY (BMI 30.0-34.9): ICD-10-CM

## 2020-01-22 LAB
ALBUMIN SERPL-MCNC: 3.8 G/DL (ref 3.4–5)
ALBUMIN/GLOB SERPL: 1 {RATIO} (ref 1–2)
ALP LIVER SERPL-CCNC: 47 U/L (ref 45–117)
ALT SERPL-CCNC: 40 U/L (ref 16–61)
ANION GAP SERPL CALC-SCNC: 7 MMOL/L (ref 0–18)
AST SERPL-CCNC: 35 U/L (ref 15–37)
BILIRUB SERPL-MCNC: 0.8 MG/DL (ref 0.1–2)
BUN BLD-MCNC: 17 MG/DL (ref 7–18)
BUN/CREAT SERPL: 14.8 (ref 10–20)
CALCIUM BLD-MCNC: 8.8 MG/DL (ref 8.5–10.1)
CHLORIDE SERPL-SCNC: 101 MMOL/L (ref 98–112)
CHOLEST SMN-MCNC: 149 MG/DL (ref ?–200)
CO2 SERPL-SCNC: 27 MMOL/L (ref 21–32)
CREAT BLD-MCNC: 1.15 MG/DL (ref 0.7–1.3)
CREAT UR-SCNC: 286 MG/DL
EST. AVERAGE GLUCOSE BLD GHB EST-MCNC: 157 MG/DL (ref 68–126)
GLOBULIN PLAS-MCNC: 4 G/DL (ref 2.8–4.4)
GLUCOSE BLD-MCNC: 144 MG/DL (ref 70–99)
HBA1C MFR BLD HPLC: 7.1 % (ref ?–5.7)
HDLC SERPL-MCNC: 36 MG/DL (ref 40–59)
LDLC SERPL CALC-MCNC: 89 MG/DL (ref ?–100)
M PROTEIN MFR SERPL ELPH: 7.8 G/DL (ref 6.4–8.2)
MICROALBUMIN UR-MCNC: 6.41 MG/DL
MICROALBUMIN/CREAT 24H UR-RTO: 22.4 UG/MG (ref ?–30)
NONHDLC SERPL-MCNC: 113 MG/DL (ref ?–130)
OSMOLALITY SERPL CALC.SUM OF ELEC: 284 MOSM/KG (ref 275–295)
PATIENT FASTING Y/N/NP: YES
PATIENT FASTING Y/N/NP: YES
POTASSIUM SERPL-SCNC: 4 MMOL/L (ref 3.5–5.1)
SODIUM SERPL-SCNC: 135 MMOL/L (ref 136–145)
TRIGL SERPL-MCNC: 119 MG/DL (ref 30–149)
VLDLC SERPL CALC-MCNC: 24 MG/DL (ref 0–30)

## 2020-01-22 PROCEDURE — 80053 COMPREHEN METABOLIC PANEL: CPT

## 2020-01-22 PROCEDURE — 83036 HEMOGLOBIN GLYCOSYLATED A1C: CPT

## 2020-01-22 PROCEDURE — 80061 LIPID PANEL: CPT

## 2020-01-22 PROCEDURE — 82570 ASSAY OF URINE CREATININE: CPT

## 2020-01-22 PROCEDURE — 99214 OFFICE O/P EST MOD 30 MIN: CPT | Performed by: FAMILY MEDICINE

## 2020-01-22 PROCEDURE — 36415 COLL VENOUS BLD VENIPUNCTURE: CPT

## 2020-01-22 PROCEDURE — 82043 UR ALBUMIN QUANTITATIVE: CPT

## 2020-01-22 NOTE — PROGRESS NOTES
Chuy Snider is a 67year old male. cc diabetes, coronary artery disease,, aortic atherosclerosis, asthma/COPD  HPI:   Patient is coming for follow-up of diabetes. He is not taking any medications. Trying to watch diet. Lost some weight.   Limit westley Never Smoker      Smokeless tobacco: Never Used    Alcohol use:  Yes      Alcohol/week: 0.0 standard drinks      Comment: 1 Lopez Lite at dinner    Drug use: No       REVIEW OF SYSTEMS:   GENERAL HEALTH: feels well otherwise  SKIN: denies any unusual skin Value Ref Range    Cholesterol, Total 161 <200 mg/dL    HDL Cholesterol 41 40 - 59 mg/dL    Triglycerides 128 30 - 149 mg/dL    LDL Cholesterol 94 <100 mg/dL    VLDL 26 0 - 30 mg/dL    Non HDL Chol 120 <130 mg/dL   MICROALB/CREAT RATIO, RANDOM URINE   Resu understanding of these issues and agrees to the plan. The patient is asked to return in April/May for super visit 2020. Andrew Hernandez

## 2020-01-22 NOTE — PATIENT INSTRUCTIONS
Do blood work today - we will decide about restarting cholesterol medication after test results. Low-fat/low carbohydrate diet. Stay active. Call [phone] to schedule ultrasound of the carotids.

## 2020-01-23 ENCOUNTER — TELEPHONE (OUTPATIENT)
Dept: FAMILY MEDICINE CLINIC | Facility: CLINIC | Age: 80
End: 2020-01-23

## 2020-01-23 DIAGNOSIS — E11.59 TYPE 2 DIABETES MELLITUS WITH OTHER CIRCULATORY COMPLICATIONS (HCC): Primary | ICD-10-CM

## 2020-01-23 RX ORDER — ROSUVASTATIN CALCIUM 5 MG/1
TABLET, COATED ORAL
Qty: 15 TABLET | Refills: 3 | Status: SHIPPED | OUTPATIENT
Start: 2020-01-23 | End: 2020-04-20

## 2020-03-23 ENCOUNTER — HOSPITAL ENCOUNTER (OUTPATIENT)
Dept: ULTRASOUND IMAGING | Facility: HOSPITAL | Age: 80
Discharge: HOME | End: 2020-03-23
Attending: FAMILY MEDICINE
Payer: MEDICARE

## 2020-03-23 DIAGNOSIS — I65.23 BILATERAL CAROTID ARTERY STENOSIS: ICD-10-CM

## 2020-03-23 PROCEDURE — 93880 EXTRACRANIAL BILAT STUDY: CPT | Performed by: FAMILY MEDICINE

## 2020-04-20 RX ORDER — ROSUVASTATIN CALCIUM 5 MG/1
TABLET, COATED ORAL
Qty: 15 TABLET | Refills: 2 | Status: SHIPPED | OUTPATIENT
Start: 2020-04-20 | End: 2020-07-03

## 2020-06-02 RX ORDER — ROSUVASTATIN CALCIUM 5 MG/1
TABLET, COATED ORAL
Qty: 15 TABLET | Refills: 2 | Status: CANCELLED | OUTPATIENT
Start: 2020-06-02

## 2020-06-19 ENCOUNTER — APPOINTMENT (OUTPATIENT)
Dept: LAB | Age: 80
End: 2020-06-19
Attending: FAMILY MEDICINE
Payer: MEDICARE

## 2020-06-19 DIAGNOSIS — E78.5 HYPERLIPIDEMIA, UNSPECIFIED HYPERLIPIDEMIA TYPE: ICD-10-CM

## 2020-06-19 DIAGNOSIS — E11.59 TYPE 2 DIABETES MELLITUS WITH OTHER CIRCULATORY COMPLICATIONS (HCC): ICD-10-CM

## 2020-06-19 PROCEDURE — 36415 COLL VENOUS BLD VENIPUNCTURE: CPT

## 2020-06-19 PROCEDURE — 84443 ASSAY THYROID STIM HORMONE: CPT

## 2020-06-19 PROCEDURE — 83036 HEMOGLOBIN GLYCOSYLATED A1C: CPT

## 2020-06-19 PROCEDURE — 80061 LIPID PANEL: CPT

## 2020-06-19 PROCEDURE — 80053 COMPREHEN METABOLIC PANEL: CPT

## 2020-06-22 ENCOUNTER — OFFICE VISIT (OUTPATIENT)
Dept: FAMILY MEDICINE CLINIC | Facility: CLINIC | Age: 80
End: 2020-06-22
Payer: MEDICARE

## 2020-06-22 VITALS
RESPIRATION RATE: 18 BRPM | DIASTOLIC BLOOD PRESSURE: 76 MMHG | SYSTOLIC BLOOD PRESSURE: 130 MMHG | BODY MASS INDEX: 30.52 KG/M2 | TEMPERATURE: 98 F | HEART RATE: 83 BPM | HEIGHT: 71 IN | OXYGEN SATURATION: 98 % | WEIGHT: 218 LBS

## 2020-06-22 DIAGNOSIS — I25.10 ATHEROSCLEROSIS OF NATIVE CORONARY ARTERY OF NATIVE HEART WITHOUT ANGINA PECTORIS: ICD-10-CM

## 2020-06-22 DIAGNOSIS — Z96.1 PSEUDOPHAKIA: ICD-10-CM

## 2020-06-22 DIAGNOSIS — E11.9 DIABETES MELLITUS TYPE 2 WITHOUT RETINOPATHY (HCC): ICD-10-CM

## 2020-06-22 DIAGNOSIS — K76.0 FATTY LIVER: ICD-10-CM

## 2020-06-22 DIAGNOSIS — J44.9 ASTHMA WITH COPD (CHRONIC OBSTRUCTIVE PULMONARY DISEASE) (HCC): Chronic | ICD-10-CM

## 2020-06-22 DIAGNOSIS — I65.23 BILATERAL CAROTID ARTERY STENOSIS: ICD-10-CM

## 2020-06-22 DIAGNOSIS — D31.31 CHOROIDAL NEVUS OF RIGHT EYE: ICD-10-CM

## 2020-06-22 DIAGNOSIS — Z12.5 SCREENING FOR MALIGNANT NEOPLASM OF PROSTATE: ICD-10-CM

## 2020-06-22 DIAGNOSIS — Z86.010 HISTORY OF ADENOMATOUS POLYP OF COLON: ICD-10-CM

## 2020-06-22 DIAGNOSIS — E78.5 HYPERLIPIDEMIA, UNSPECIFIED HYPERLIPIDEMIA TYPE: ICD-10-CM

## 2020-06-22 DIAGNOSIS — Z00.00 ENCOUNTER FOR ANNUAL HEALTH EXAMINATION: Primary | ICD-10-CM

## 2020-06-22 DIAGNOSIS — E11.59 TYPE 2 DIABETES MELLITUS WITH OTHER CIRCULATORY COMPLICATIONS (HCC): ICD-10-CM

## 2020-06-22 DIAGNOSIS — I70.0 ATHEROSCLEROSIS OF ABDOMINAL AORTA (HCC): ICD-10-CM

## 2020-06-22 DIAGNOSIS — E66.9 OBESITY (BMI 30.0-34.9): ICD-10-CM

## 2020-06-22 DIAGNOSIS — I25.10 CORONARY ARTERY CALCIFICATION SEEN ON CT SCAN: ICD-10-CM

## 2020-06-22 DIAGNOSIS — M17.10 PRIMARY LOCALIZED OSTEOARTHROSIS OF LOWER LEG, UNSPECIFIED LATERALITY: ICD-10-CM

## 2020-06-22 DIAGNOSIS — Z96.651 STATUS POST TOTAL RIGHT KNEE REPLACEMENT: ICD-10-CM

## 2020-06-22 PROCEDURE — 96160 PT-FOCUSED HLTH RISK ASSMT: CPT | Performed by: FAMILY MEDICINE

## 2020-06-22 PROCEDURE — 99397 PER PM REEVAL EST PAT 65+ YR: CPT | Performed by: FAMILY MEDICINE

## 2020-06-22 PROCEDURE — G0439 PPPS, SUBSEQ VISIT: HCPCS | Performed by: FAMILY MEDICINE

## 2020-06-22 RX ORDER — BUDESONIDE AND FORMOTEROL FUMARATE DIHYDRATE 160; 4.5 UG/1; UG/1
2 AEROSOL RESPIRATORY (INHALATION) 2 TIMES DAILY
Qty: 1 INHALER | Refills: 5 | Status: SHIPPED | OUTPATIENT
Start: 2020-06-22 | End: 2020-06-24

## 2020-06-22 NOTE — PROGRESS NOTES
HPI:   Chuy Alvarez is a 67year old male who presents for a MA (Medicare Advantage) [facility] (Once per calendar year) .        His last annual assessment has been over 1 year: Annual Physical due on 01/25/2017         Patient Care Team: Patient C mouth daily. Multiple Vitamins-Minerals (SUPER HIGH VITAMINS/MINERALS) Oral Tab, Take 1 Tab by mouth daily.        MEDICAL INFORMATION:   He  has a past medical history of Mild intermittent asthma (4/3/2019), Mild intermittent asthma without complication ( kg).    Medicare Hearing Assessment  (Required for AWV/SWV)    Finger Rub - normal bilaterally,      Visual Acuity  Right Eye Visual Acuity: Corrected Right Eye Chart Acuity: 20/25   Left Eye Visual Acuity: Corrected Left Eye Chart Acuity: 20/25   Both Eye Pneumococcal (Prevnar 13) 01/13/2016     Results for orders placed or performed in visit on 06/19/20   LIPID PANEL   Result Value Ref Range    Cholesterol, Total 128 <200 mg/dL    HDL Cholesterol 40 40 - 59 mg/dL    Triglycerides 109 30 - 149 mg/dL    LDL REFLEX TO FREE T4; Future    Screening for malignant neoplasm of prostate  -     PSA SCREEN; Future    Asthma with COPD (chronic obstructive pulmonary disease) (HCC)  -     CBC WITH DIFFERENTIAL WITH PLATELET;  Future    Atherosclerosis of abdominal aorta ( movements normal.  Monitor. Atherosclerosis of the abdominal aorta monitor continue cholesterol-lowering medication    Obesity -  low-carb diet. Weight loss recommended. Depression screen reviewed.        Mr. Vivian Dexter already takes aspirin and has it without help    Are you able to afford your medications?: Yes    Hearing Problems?: No     Functional Status     Hearing Problems?: No    Vision Problems? : No    Difficulty walking?: No    Difficulty dressing or bathing?: No    Problems with daily activit necessary Electrocardiogram date01/30/2017    Colorectal Cancer Screening      Colonoscopy Screen every 10 years Colonoscopy due on 05/14/2021 Update Health Maintenance if applicable    Flex Sigmoidoscopy Screen every 10 years No results found for this or found.    Creat/alb ratio  Annually      LDL  Annually LDL Cholesterol (mg/dL)   Date Value   06/19/2020 66     LDL-CHOLESTEROL (mg/dL (calc))   Date Value   04/29/2016 86    No flowsheet data found.      Dilated Eye exam  Annually Data entered on: 8/23/201

## 2020-06-22 NOTE — PATIENT INSTRUCTIONS
Call [phone] to schedule  blood work. Have a blood work done 1 week prior visit in October 2020. Continue current medications. Restart Symbicort 1 puff twice a day. Healthy diet. Stay active.   Chuy Dietz's SCREENING SCHEDULE   Tests on th Electrocardiogram date04/17/2019 Routine EKG is not a screening covered service except at the [place] to Medicare Visit    Abdominal aortic aneurysm screening (once between ages 73-68)  No results found for this or any previous visit.  Limited to patients w • PNEUMOCOCCAL IMM (PNEUMOVAX)    Please get once after your 65th birthday    Hepatitis B for Moderate/High Risk No orders found for this or any previous visit.  Medium/high risk factors:   End-stage renal disease   Hemophiliacs who received Factor VIII o

## 2020-06-24 ENCOUNTER — TELEPHONE (OUTPATIENT)
Dept: FAMILY MEDICINE CLINIC | Facility: CLINIC | Age: 80
End: 2020-06-24

## 2020-06-24 DIAGNOSIS — J44.9 ASTHMA WITH COPD (CHRONIC OBSTRUCTIVE PULMONARY DISEASE) (HCC): Primary | Chronic | ICD-10-CM

## 2020-06-24 RX ORDER — BUDESONIDE AND FORMOTEROL FUMARATE DIHYDRATE 160; 4.5 UG/1; UG/1
2 AEROSOL RESPIRATORY (INHALATION) 2 TIMES DAILY
Qty: 1 INHALER | Refills: 5 | OUTPATIENT
Start: 2020-06-24 | End: 2021-01-14 | Stop reason: ALTCHOICE

## 2020-06-24 NOTE — TELEPHONE ENCOUNTER
Received request for a PA to be completed for pt's generic Symbicort. Looking into this, insurance will cover name brand over generic. Called to pt's pharmacy to give verbal, spoke with tech who stated that the pt had picked up the name brand yesterday.

## 2020-07-03 RX ORDER — ROSUVASTATIN CALCIUM 5 MG/1
TABLET, COATED ORAL
Qty: 45 TABLET | Refills: 1 | Status: SHIPPED | OUTPATIENT
Start: 2020-07-03 | End: 2020-12-19

## 2020-11-06 ENCOUNTER — TELEPHONE (OUTPATIENT)
Dept: FAMILY MEDICINE CLINIC | Facility: CLINIC | Age: 80
End: 2020-11-06

## 2020-11-06 NOTE — TELEPHONE ENCOUNTER
Letter initiated per dr almonte's instructions and sent thru pt's mychart. Call to pt-advised of above info.  He voices no other questions

## 2020-11-06 NOTE — TELEPHONE ENCOUNTER
Spoke with Chuy and he stated that they received their letters through [address] [place] [address] [phone].

## 2020-11-06 NOTE — TELEPHONE ENCOUNTER
Pt states he and his wife are pt's here and at the [employer]. Pt states he needs a not from the doctor stating they are high risk and shouldn't be going to the health club. Pt is ok to have letter sent to [address] Please advise.

## 2020-12-23 RX ORDER — ROSUVASTATIN CALCIUM 5 MG/1
TABLET, COATED ORAL
Qty: 45 TABLET | Refills: 0 | Status: SHIPPED | OUTPATIENT
Start: 2020-12-23 | End: 2021-01-14

## 2020-12-23 NOTE — TELEPHONE ENCOUNTER
Pt calling to check on the status of his refill request. He is now out of one of them. Please advise.

## 2021-01-13 ENCOUNTER — LABORATORY ENCOUNTER (OUTPATIENT)
Dept: LAB | Age: 81
End: 2021-01-13
Attending: FAMILY MEDICINE
Payer: MEDICARE

## 2021-01-13 DIAGNOSIS — E78.5 HYPERLIPIDEMIA, UNSPECIFIED HYPERLIPIDEMIA TYPE: ICD-10-CM

## 2021-01-13 DIAGNOSIS — E11.59 TYPE 2 DIABETES MELLITUS WITH OTHER CIRCULATORY COMPLICATIONS (HCC): ICD-10-CM

## 2021-01-13 DIAGNOSIS — Z12.5 SCREENING FOR MALIGNANT NEOPLASM OF PROSTATE: ICD-10-CM

## 2021-01-13 DIAGNOSIS — J44.9 ASTHMA WITH COPD (CHRONIC OBSTRUCTIVE PULMONARY DISEASE) (HCC): ICD-10-CM

## 2021-01-13 LAB
ALBUMIN SERPL-MCNC: 4 G/DL (ref 3.4–5)
ALBUMIN/GLOB SERPL: 1.3 {RATIO} (ref 1–2)
ALP LIVER SERPL-CCNC: 46 U/L
ALT SERPL-CCNC: 37 U/L
ANION GAP SERPL CALC-SCNC: 5 MMOL/L (ref 0–18)
AST SERPL-CCNC: 24 U/L (ref 15–37)
BASOPHILS # BLD AUTO: 0.04 X10(3) UL (ref 0–0.2)
BASOPHILS NFR BLD AUTO: 0.5 %
BILIRUB SERPL-MCNC: 1 MG/DL (ref 0.1–2)
BILIRUB UR QL STRIP.AUTO: NEGATIVE
BUN BLD-MCNC: 18 MG/DL (ref 7–18)
BUN/CREAT SERPL: 22.5 (ref 10–20)
CALCIUM BLD-MCNC: 9.5 MG/DL (ref 8.5–10.1)
CHLORIDE SERPL-SCNC: 104 MMOL/L (ref 98–112)
CHOLEST SMN-MCNC: 131 MG/DL (ref ?–200)
CLARITY UR REFRACT.AUTO: CLEAR
CO2 SERPL-SCNC: 28 MMOL/L (ref 21–32)
COLOR UR AUTO: YELLOW
COMPLEXED PSA SERPL-MCNC: 3.36 NG/ML (ref ?–4)
CREAT BLD-MCNC: 0.8 MG/DL
CREAT UR-SCNC: 164 MG/DL
DEPRECATED RDW RBC AUTO: 41.9 FL (ref 35.1–46.3)
EOSINOPHIL # BLD AUTO: 0.35 X10(3) UL (ref 0–0.7)
EOSINOPHIL NFR BLD AUTO: 4.6 %
ERYTHROCYTE [DISTWIDTH] IN BLOOD BY AUTOMATED COUNT: 12.2 % (ref 11–15)
EST. AVERAGE GLUCOSE BLD GHB EST-MCNC: 157 MG/DL (ref 68–126)
GLOBULIN PLAS-MCNC: 3.1 G/DL (ref 2.8–4.4)
GLUCOSE BLD-MCNC: 128 MG/DL (ref 70–99)
GLUCOSE UR STRIP.AUTO-MCNC: NEGATIVE MG/DL
HBA1C MFR BLD HPLC: 7.1 % (ref ?–5.7)
HCT VFR BLD AUTO: 49.9 %
HDLC SERPL-MCNC: 46 MG/DL (ref 40–59)
HGB BLD-MCNC: 17 G/DL
IMM GRANULOCYTES # BLD AUTO: 0.03 X10(3) UL (ref 0–1)
IMM GRANULOCYTES NFR BLD: 0.4 %
KETONES UR STRIP.AUTO-MCNC: NEGATIVE MG/DL
LDLC SERPL CALC-MCNC: 63 MG/DL (ref ?–100)
LYMPHOCYTES # BLD AUTO: 2.48 X10(3) UL (ref 1–4)
LYMPHOCYTES NFR BLD AUTO: 32.5 %
M PROTEIN MFR SERPL ELPH: 7.1 G/DL (ref 6.4–8.2)
MCH RBC QN AUTO: 31.6 PG (ref 26–34)
MCHC RBC AUTO-ENTMCNC: 34.1 G/DL (ref 31–37)
MCV RBC AUTO: 92.8 FL
MICROALBUMIN UR-MCNC: 5.59 MG/DL
MICROALBUMIN/CREAT 24H UR-RTO: 34.1 UG/MG (ref ?–30)
MONOCYTES # BLD AUTO: 0.62 X10(3) UL (ref 0.1–1)
MONOCYTES NFR BLD AUTO: 8.1 %
NEUTROPHILS # BLD AUTO: 4.1 X10 (3) UL (ref 1.5–7.7)
NEUTROPHILS # BLD AUTO: 4.1 X10(3) UL (ref 1.5–7.7)
NEUTROPHILS NFR BLD AUTO: 53.9 %
NITRITE UR QL STRIP.AUTO: NEGATIVE
NONHDLC SERPL-MCNC: 85 MG/DL (ref ?–130)
OSMOLALITY SERPL CALC.SUM OF ELEC: 288 MOSM/KG (ref 275–295)
PATIENT FASTING Y/N/NP: YES
PATIENT FASTING Y/N/NP: YES
PH UR STRIP.AUTO: 5 [PH] (ref 4.5–8)
PLATELET # BLD AUTO: 252 10(3)UL (ref 150–450)
POTASSIUM SERPL-SCNC: 4 MMOL/L (ref 3.5–5.1)
PROT UR STRIP.AUTO-MCNC: NEGATIVE MG/DL
RBC # BLD AUTO: 5.38 X10(6)UL
RBC #/AREA URNS AUTO: >10 /HPF
SODIUM SERPL-SCNC: 137 MMOL/L (ref 136–145)
SP GR UR STRIP.AUTO: 1.02 (ref 1–1.03)
TRIGL SERPL-MCNC: 109 MG/DL (ref 30–149)
UROBILINOGEN UR STRIP.AUTO-MCNC: <2 MG/DL
VLDLC SERPL CALC-MCNC: 22 MG/DL (ref 0–30)
WBC # BLD AUTO: 7.6 X10(3) UL (ref 4–11)

## 2021-01-13 PROCEDURE — 81001 URINALYSIS AUTO W/SCOPE: CPT

## 2021-01-13 PROCEDURE — 85025 COMPLETE CBC W/AUTO DIFF WBC: CPT

## 2021-01-13 PROCEDURE — 82570 ASSAY OF URINE CREATININE: CPT

## 2021-01-13 PROCEDURE — 83036 HEMOGLOBIN GLYCOSYLATED A1C: CPT

## 2021-01-13 PROCEDURE — 82043 UR ALBUMIN QUANTITATIVE: CPT

## 2021-01-13 PROCEDURE — 36415 COLL VENOUS BLD VENIPUNCTURE: CPT

## 2021-01-13 PROCEDURE — 87086 URINE CULTURE/COLONY COUNT: CPT

## 2021-01-13 PROCEDURE — 80053 COMPREHEN METABOLIC PANEL: CPT

## 2021-01-13 PROCEDURE — 80061 LIPID PANEL: CPT

## 2021-01-14 ENCOUNTER — OFFICE VISIT (OUTPATIENT)
Dept: FAMILY MEDICINE CLINIC | Facility: CLINIC | Age: 81
End: 2021-01-14
Payer: MEDICARE

## 2021-01-14 VITALS
HEIGHT: 71 IN | SYSTOLIC BLOOD PRESSURE: 132 MMHG | WEIGHT: 210 LBS | HEART RATE: 74 BPM | TEMPERATURE: 97 F | DIASTOLIC BLOOD PRESSURE: 80 MMHG | OXYGEN SATURATION: 97 % | BODY MASS INDEX: 29.4 KG/M2 | RESPIRATION RATE: 16 BRPM

## 2021-01-14 DIAGNOSIS — R31.21 ASYMPTOMATIC MICROSCOPIC HEMATURIA: ICD-10-CM

## 2021-01-14 DIAGNOSIS — J45.30 MILD PERSISTENT ASTHMA WITHOUT COMPLICATION: ICD-10-CM

## 2021-01-14 DIAGNOSIS — K76.0 FATTY LIVER: ICD-10-CM

## 2021-01-14 DIAGNOSIS — E78.5 HYPERLIPIDEMIA, UNSPECIFIED HYPERLIPIDEMIA TYPE: ICD-10-CM

## 2021-01-14 DIAGNOSIS — I70.0 ATHEROSCLEROSIS OF ABDOMINAL AORTA (HCC): ICD-10-CM

## 2021-01-14 DIAGNOSIS — E66.9 OBESITY (BMI 30.0-34.9): ICD-10-CM

## 2021-01-14 DIAGNOSIS — E11.59 TYPE 2 DIABETES MELLITUS WITH OTHER CIRCULATORY COMPLICATIONS (HCC): Primary | ICD-10-CM

## 2021-01-14 PROCEDURE — 3079F DIAST BP 80-89 MM HG: CPT | Performed by: FAMILY MEDICINE

## 2021-01-14 PROCEDURE — 99214 OFFICE O/P EST MOD 30 MIN: CPT | Performed by: FAMILY MEDICINE

## 2021-01-14 PROCEDURE — 3075F SYST BP GE 130 - 139MM HG: CPT | Performed by: FAMILY MEDICINE

## 2021-01-14 PROCEDURE — 3008F BODY MASS INDEX DOCD: CPT | Performed by: FAMILY MEDICINE

## 2021-01-14 RX ORDER — ROSUVASTATIN CALCIUM 5 MG/1
TABLET, COATED ORAL
Qty: 45 TABLET | Refills: 1 | Status: SHIPPED | OUTPATIENT
Start: 2021-01-14 | End: 2021-04-28

## 2021-01-14 NOTE — PROGRESS NOTES
Max Mariam Cabot is a 79year old male. cc diabetes, coronary artery disease,, aortic atherosclerosis, asthma/COPD, fatty liver, blood in the urine  HPI:   Patient is coming for follow-up of diabetes. He is not taking any medications.   Trying to watch di tobacco: Never Used    Alcohol use:  Yes      Alcohol/week: 0.0 standard drinks      Comment: 1 Lopez Lite at dinner    Drug use: No       REVIEW OF SYSTEMS:   GENERAL HEALTH: feels well otherwise  SKIN: denies any unusual skin lesions or rashes  HEENT no Urine Negative Negative mg/dL    Blood Urine Small (A) Negative    pH Urine 5.0 4.5 - 8.0    Protein Urine Negative Negative mg/dl    Urobilinogen Urine <2.0 0.2 - 2.0 mg/dL    Nitrite Urine Negative Negative    Leukocyte Esterase Urine Trace (A) Negative HCT 49.9 39.0 - 53.0 %    .0 150.0 - 450.0 10(3)uL    MCV 92.8 80.0 - 100.0 fL    MCH 31.6 26.0 - 34.0 pg    MCHC 34.1 31.0 - 37.0 g/dL    RDW 12.2 11.0 - 15.0 %    RDW-SD 41.9 35.1 - 46.3 fL    Neutrophil Absolute Prelim 4.10 1.50 - 7.70 x10 (3) time   • metFORMIN HCl 500 MG Oral Tab 90 tablet 1     Sig: Take 1 tablet (500 mg total) by mouth daily with breakfast.   Call [phone] to schedule  ultrasound of the kidneys and bladder. Recheck urine test in 1 month. Keep good hydration.   Continue

## 2021-01-14 NOTE — PATIENT INSTRUCTIONS
Call [phone] to schedule  ultrasound of the kidneys and bladder. Recheck urine test in 1 month. Keep good hydration. Continue current medications at current doses. Low-carb/low-fat diet. Schedule super visit for June 2021.

## 2021-01-25 DIAGNOSIS — Z23 NEED FOR VACCINATION: ICD-10-CM

## 2021-01-27 ENCOUNTER — IMMUNIZATION (OUTPATIENT)
Dept: LAB | Age: 81
End: 2021-01-27
Attending: HOSPITALIST
Payer: MEDICARE

## 2021-01-27 DIAGNOSIS — Z23 NEED FOR VACCINATION: Primary | ICD-10-CM

## 2021-01-27 PROCEDURE — 0001A SARSCOV2 VAC 30MCG/0.3ML IM: CPT

## 2021-02-01 ENCOUNTER — HOSPITAL ENCOUNTER (OUTPATIENT)
Dept: ULTRASOUND IMAGING | Facility: HOSPITAL | Age: 81
Discharge: HOME | End: 2021-02-01
Attending: FAMILY MEDICINE
Payer: MEDICARE

## 2021-02-01 DIAGNOSIS — R31.21 ASYMPTOMATIC MICROSCOPIC HEMATURIA: ICD-10-CM

## 2021-02-01 PROCEDURE — 76770 US EXAM ABDO BACK WALL COMP: CPT | Performed by: FAMILY MEDICINE

## 2021-02-02 DIAGNOSIS — N20.0 RENAL CALCULI: Primary | ICD-10-CM

## 2021-02-12 ENCOUNTER — PATIENT MESSAGE (OUTPATIENT)
Dept: FAMILY MEDICINE CLINIC | Facility: CLINIC | Age: 81
End: 2021-02-12

## 2021-02-15 NOTE — TELEPHONE ENCOUNTER
CT was ordered by Dr. Kathy Wolff, [address], [address] [facility] [phone] message sent to patient in response to [employer] message received--->see message.       From: Aashish Deluca  To: Veronique Betancourt

## 2021-02-17 ENCOUNTER — IMMUNIZATION (OUTPATIENT)
Dept: LAB | Age: 81
End: 2021-02-17
Attending: HOSPITALIST
Payer: MEDICARE

## 2021-02-17 DIAGNOSIS — Z23 NEED FOR VACCINATION: Primary | ICD-10-CM

## 2021-02-17 PROCEDURE — 0002A SARSCOV2 VAC 30MCG/0.3ML IM: CPT

## 2021-02-19 ENCOUNTER — PATIENT MESSAGE (OUTPATIENT)
Dept: FAMILY MEDICINE CLINIC | Facility: CLINIC | Age: 81
End: 2021-02-19

## 2021-02-19 NOTE — TELEPHONE ENCOUNTER
Dr. Farrukh Copeland to advise, if any,    I called Dr. Vishal Candelaria office, I spoke with CARLOS Mora and made her aware CT performed 10 days ago and patient has not been able to reach Dr. Herminia oMdi for interpretation or recommendation related to result.  She sta

## 2021-02-19 NOTE — TELEPHONE ENCOUNTER
From: Lucita Vickers  To: Leatha Bowen MD  Sent: 2/19/2021  3:32 PM CST  Subject: Test Results Question    Thanks for your help. Someone from the urologist's office called and gave me a new appointment.   Chuy Rankin    (forwarded to Dr. Raegan Rondon

## 2021-02-19 NOTE — TELEPHONE ENCOUNTER
[employer] message sent to patient in response to [employer] message received--->see message. From: Carolyn Wilde  To: Andry Reddy MD  Sent: 2/19/2021  3:32 PM CST  Subject: Test Results Question    Thanks for your help.  Someone from the [employer]

## 2021-02-24 ENCOUNTER — TELEPHONE (OUTPATIENT)
Dept: FAMILY MEDICINE CLINIC | Facility: CLINIC | Age: 81
End: 2021-02-24

## 2021-02-24 NOTE — TELEPHONE ENCOUNTER
Left a message for the pt to schedule his pre-op with Dr. Aniket Muro. His surgery is scheduled for 03/16/2021 with Dr. Angel Luis Gray at [facility].

## 2021-03-04 ENCOUNTER — LAB ENCOUNTER (OUTPATIENT)
Dept: LAB | Age: 81
End: 2021-03-04
Attending: FAMILY MEDICINE
Payer: MEDICARE

## 2021-03-04 DIAGNOSIS — N20.0 KIDNEY STONE: ICD-10-CM

## 2021-03-04 DIAGNOSIS — E11.9 DIABETES MELLITUS WITHOUT COMPLICATION (HCC): ICD-10-CM

## 2021-03-04 DIAGNOSIS — R31.21 ASYMPTOMATIC MICROSCOPIC HEMATURIA: ICD-10-CM

## 2021-03-04 LAB
ANION GAP SERPL CALC-SCNC: 7 MMOL/L (ref 0–18)
BILIRUB UR QL STRIP.AUTO: NEGATIVE
BUN BLD-MCNC: 15 MG/DL (ref 7–18)
BUN/CREAT SERPL: 16.1 (ref 10–20)
CALCIUM BLD-MCNC: 9 MG/DL (ref 8.5–10.1)
CHLORIDE SERPL-SCNC: 106 MMOL/L (ref 98–112)
CLARITY UR REFRACT.AUTO: CLEAR
CO2 SERPL-SCNC: 26 MMOL/L (ref 21–32)
COLOR UR AUTO: YELLOW
CREAT BLD-MCNC: 0.93 MG/DL
GLUCOSE BLD-MCNC: 136 MG/DL (ref 70–99)
GLUCOSE UR STRIP.AUTO-MCNC: NEGATIVE MG/DL
KETONES UR STRIP.AUTO-MCNC: NEGATIVE MG/DL
LEUKOCYTE ESTERASE UR QL STRIP.AUTO: NEGATIVE
NITRITE UR QL STRIP.AUTO: NEGATIVE
OSMOLALITY SERPL CALC.SUM OF ELEC: 291 MOSM/KG (ref 275–295)
PATIENT FASTING Y/N/NP: YES
PH UR STRIP.AUTO: 6 [PH] (ref 5–8)
POTASSIUM SERPL-SCNC: 4.2 MMOL/L (ref 3.5–5.1)
PROT UR STRIP.AUTO-MCNC: NEGATIVE MG/DL
RBC UR QL AUTO: NEGATIVE
SODIUM SERPL-SCNC: 139 MMOL/L (ref 136–145)
SP GR UR STRIP.AUTO: 1.02 (ref 1–1.03)
UROBILINOGEN UR STRIP.AUTO-MCNC: <2 MG/DL

## 2021-03-04 PROCEDURE — 81003 URINALYSIS AUTO W/O SCOPE: CPT

## 2021-03-04 PROCEDURE — 36415 COLL VENOUS BLD VENIPUNCTURE: CPT

## 2021-03-04 PROCEDURE — 80048 BASIC METABOLIC PNL TOTAL CA: CPT

## 2021-03-05 ENCOUNTER — OFFICE VISIT (OUTPATIENT)
Dept: FAMILY MEDICINE CLINIC | Facility: CLINIC | Age: 81
End: 2021-03-05
Payer: MEDICARE

## 2021-03-05 VITALS
DIASTOLIC BLOOD PRESSURE: 56 MMHG | BODY MASS INDEX: 28.44 KG/M2 | SYSTOLIC BLOOD PRESSURE: 116 MMHG | WEIGHT: 210 LBS | RESPIRATION RATE: 16 BRPM | HEIGHT: 72 IN | TEMPERATURE: 97 F | OXYGEN SATURATION: 96 % | HEART RATE: 68 BPM

## 2021-03-05 DIAGNOSIS — Z01.818 PRE-OP EXAMINATION: Primary | ICD-10-CM

## 2021-03-05 DIAGNOSIS — I65.23 BILATERAL CAROTID ARTERY STENOSIS: ICD-10-CM

## 2021-03-05 DIAGNOSIS — N20.0 LEFT NEPHROLITHIASIS: ICD-10-CM

## 2021-03-05 DIAGNOSIS — I70.0 ATHEROSCLEROSIS OF ABDOMINAL AORTA (HCC): ICD-10-CM

## 2021-03-05 DIAGNOSIS — I25.10 ATHEROSCLEROSIS OF NATIVE CORONARY ARTERY OF NATIVE HEART WITHOUT ANGINA PECTORIS: ICD-10-CM

## 2021-03-05 DIAGNOSIS — E11.9 DIABETES MELLITUS TYPE 2 WITHOUT RETINOPATHY (HCC): ICD-10-CM

## 2021-03-05 DIAGNOSIS — J45.20 MILD INTERMITTENT ASTHMA, UNSPECIFIED WHETHER COMPLICATED: ICD-10-CM

## 2021-03-05 DIAGNOSIS — N20.0 RENAL STONE: ICD-10-CM

## 2021-03-05 DIAGNOSIS — E78.5 HYPERLIPIDEMIA, UNSPECIFIED HYPERLIPIDEMIA TYPE: ICD-10-CM

## 2021-03-05 PROCEDURE — 3008F BODY MASS INDEX DOCD: CPT | Performed by: FAMILY MEDICINE

## 2021-03-05 PROCEDURE — 3074F SYST BP LT 130 MM HG: CPT | Performed by: FAMILY MEDICINE

## 2021-03-05 PROCEDURE — 3078F DIAST BP <80 MM HG: CPT | Performed by: FAMILY MEDICINE

## 2021-03-05 PROCEDURE — 93000 ELECTROCARDIOGRAM COMPLETE: CPT | Performed by: FAMILY MEDICINE

## 2021-03-05 PROCEDURE — 99214 OFFICE O/P EST MOD 30 MIN: CPT | Performed by: FAMILY MEDICINE

## 2021-03-05 NOTE — PATIENT INSTRUCTIONS
Stop Metformin 24 hours before surgery and restart 48 hours after surgery. Continue current meds. Keep good hydration. DO NOT take Aspirin, Advil, Ibuprofen, Aleve , Motrin for 1 week prior surgery. Take Tylenol  as needed for pain.

## 2021-03-05 NOTE — PROGRESS NOTES
Chuy Osborn is a 79year old male who presents for a pre-operative physical exam. Patient is to have cystoscopy, left retrograde pyelogram, left ureteroscopy, stone extraction, laser lithotripsy, left ureteral stent placement, to be done by Dr. Teresa Galeana [id number] N/A 5/14/2016    Performed by Rayray Burns MD at [facility]   • KNEE REPLACEMENT SURGERY  2/1/2013    Right TKR by Dr. Brandee Verde   • LEFT PHACOEMULSIFICATION OF CATARACT WITH INTRAOCULAR LENS IMPLANT [id number] Left 12/14/2015    Performed b no chest tenderness  BREAST  deferred  LUNGS: clear to auscultation  CARDIO: RRR without murmur  GI: good BS's,no masses, HSM or tenderness  : deferred  RECTAL: Deferred  MUSCULOSKELETAL: back is not tender,FROM of the back  EXTREMITIES: no cyanosis, clu pectoris  Renal stone  Hyperlipidemia, unspecified hyperlipidemia type  Atherosclerosis of abdominal aorta (hcc)  Bilateral carotid artery stenosis  Mild intermittent asthma, unspecified whether complicated    Patient is in a satisfactory condition and acc

## 2021-04-22 ENCOUNTER — MED REC SCAN ONLY (OUTPATIENT)
Dept: FAMILY MEDICINE CLINIC | Facility: CLINIC | Age: 81
End: 2021-04-22

## 2021-04-26 ENCOUNTER — TELEPHONE (OUTPATIENT)
Dept: FAMILY MEDICINE CLINIC | Facility: CLINIC | Age: 81
End: 2021-04-26

## 2021-04-26 RX ORDER — ROSUVASTATIN CALCIUM 5 MG/1
TABLET, COATED ORAL
Qty: 45 TABLET | Refills: 1 | Status: CANCELLED | OUTPATIENT
Start: 2021-04-26

## 2021-04-27 NOTE — TELEPHONE ENCOUNTER
Rosuvastatin Calcium 5 MG Oral Tab 45 tablet 1 1/14/2021     Sig: Take 1 tab by mouth Mon, Wed, Fri at night time    Sent to pharmacy as: Rosuvastatin Calcium 5 MG Oral Tablet (CRESTOR)    E-Prescribing Status: Receipt confirmed by pharmacy (1/14/2021  9:2

## 2021-04-28 RX ORDER — ROSUVASTATIN CALCIUM 5 MG/1
TABLET, COATED ORAL
Qty: 45 TABLET | Refills: 0 | Status: SHIPPED | OUTPATIENT
Start: 2021-04-28 | End: 2021-07-23

## 2021-04-28 NOTE — TELEPHONE ENCOUNTER
Pt states that he just spoke to the pharmacy and they stated that they do not have any additional refills on file. Please advise.

## 2021-06-07 ENCOUNTER — LAB ENCOUNTER (OUTPATIENT)
Dept: LAB | Age: 81
End: 2021-06-07
Attending: FAMILY MEDICINE
Payer: MEDICARE

## 2021-06-07 ENCOUNTER — OFFICE VISIT (OUTPATIENT)
Dept: FAMILY MEDICINE CLINIC | Facility: CLINIC | Age: 81
End: 2021-06-07
Payer: MEDICARE

## 2021-06-07 VITALS
BODY MASS INDEX: 29.73 KG/M2 | HEIGHT: 70.47 IN | DIASTOLIC BLOOD PRESSURE: 70 MMHG | TEMPERATURE: 98 F | SYSTOLIC BLOOD PRESSURE: 138 MMHG | WEIGHT: 210 LBS | RESPIRATION RATE: 18 BRPM | HEART RATE: 76 BPM

## 2021-06-07 DIAGNOSIS — Z96.651 STATUS POST TOTAL RIGHT KNEE REPLACEMENT: ICD-10-CM

## 2021-06-07 DIAGNOSIS — I70.0 ATHEROSCLEROSIS OF ABDOMINAL AORTA (HCC): ICD-10-CM

## 2021-06-07 DIAGNOSIS — J44.9 ASTHMA WITH COPD (CHRONIC OBSTRUCTIVE PULMONARY DISEASE) (HCC): ICD-10-CM

## 2021-06-07 DIAGNOSIS — J45.20 MILD INTERMITTENT ASTHMA, UNSPECIFIED WHETHER COMPLICATED: ICD-10-CM

## 2021-06-07 DIAGNOSIS — Z96.1 PSEUDOPHAKIA: ICD-10-CM

## 2021-06-07 DIAGNOSIS — E11.9 DIABETES MELLITUS TYPE 2 WITHOUT RETINOPATHY (HCC): ICD-10-CM

## 2021-06-07 DIAGNOSIS — Z00.00 ENCOUNTER FOR ANNUAL HEALTH EXAMINATION: Primary | ICD-10-CM

## 2021-06-07 DIAGNOSIS — Z86.010 HISTORY OF ADENOMATOUS POLYP OF COLON: ICD-10-CM

## 2021-06-07 DIAGNOSIS — E78.5 HYPERLIPIDEMIA, UNSPECIFIED HYPERLIPIDEMIA TYPE: ICD-10-CM

## 2021-06-07 DIAGNOSIS — D31.31 CHOROIDAL NEVUS OF RIGHT EYE: ICD-10-CM

## 2021-06-07 DIAGNOSIS — E11.59 TYPE 2 DIABETES MELLITUS WITH OTHER CIRCULATORY COMPLICATIONS (HCC): ICD-10-CM

## 2021-06-07 DIAGNOSIS — Z12.11 SCREEN FOR COLON CANCER: ICD-10-CM

## 2021-06-07 DIAGNOSIS — I25.10 ATHEROSCLEROSIS OF NATIVE CORONARY ARTERY OF NATIVE HEART WITHOUT ANGINA PECTORIS: ICD-10-CM

## 2021-06-07 DIAGNOSIS — K76.0 FATTY LIVER: ICD-10-CM

## 2021-06-07 DIAGNOSIS — I65.23 BILATERAL CAROTID ARTERY STENOSIS: ICD-10-CM

## 2021-06-07 DIAGNOSIS — I25.10 CORONARY ARTERY CALCIFICATION SEEN ON CT SCAN: ICD-10-CM

## 2021-06-07 PROBLEM — E66.811 OBESITY (BMI 30.0-34.9): Status: RESOLVED | Noted: 2017-09-11 | Resolved: 2021-06-07

## 2021-06-07 PROBLEM — E66.9 OBESITY (BMI 30.0-34.9): Status: RESOLVED | Noted: 2017-09-11 | Resolved: 2021-06-07

## 2021-06-07 PROCEDURE — 99397 PER PM REEVAL EST PAT 65+ YR: CPT | Performed by: FAMILY MEDICINE

## 2021-06-07 PROCEDURE — 82570 ASSAY OF URINE CREATININE: CPT

## 2021-06-07 PROCEDURE — 80053 COMPREHEN METABOLIC PANEL: CPT

## 2021-06-07 PROCEDURE — 80061 LIPID PANEL: CPT

## 2021-06-07 PROCEDURE — G0439 PPPS, SUBSEQ VISIT: HCPCS | Performed by: FAMILY MEDICINE

## 2021-06-07 PROCEDURE — 83036 HEMOGLOBIN GLYCOSYLATED A1C: CPT

## 2021-06-07 PROCEDURE — 36415 COLL VENOUS BLD VENIPUNCTURE: CPT

## 2021-06-07 PROCEDURE — 3078F DIAST BP <80 MM HG: CPT | Performed by: FAMILY MEDICINE

## 2021-06-07 PROCEDURE — 82043 UR ALBUMIN QUANTITATIVE: CPT

## 2021-06-07 PROCEDURE — 3008F BODY MASS INDEX DOCD: CPT | Performed by: FAMILY MEDICINE

## 2021-06-07 PROCEDURE — 96160 PT-FOCUSED HLTH RISK ASSMT: CPT | Performed by: FAMILY MEDICINE

## 2021-06-07 PROCEDURE — 3075F SYST BP GE 130 - 139MM HG: CPT | Performed by: FAMILY MEDICINE

## 2021-06-07 RX ORDER — TAMSULOSIN HYDROCHLORIDE 0.4 MG/1
CAPSULE ORAL
COMMUNITY
Start: 2021-05-29

## 2021-06-07 NOTE — PROGRESS NOTES
HPI:   Max Mariam Cabot is a 79year old male who presents for a MA (Medicare Advantage) [facility] (Once per calendar year) . Patient is due for colonoscopy this year.   His last annual assessment has been over 1 year: Annual Physical due on 01/25/ by mouth daily.        MEDICAL INFORMATION:   He  has a past medical history of Mild intermittent asthma (4/3/2019), Mild intermittent asthma without complication (3/92/3090), Obesity (BMI 30.0-34.9) (9/11/2017), Short of breath on exertion (1/13/2016), and Visual Acuity: Uncorrected Right Eye Chart Acuity: 20/20   Left Eye Visual Acuity: Uncorrected Left Eye Chart Acuity: 20/20   Both Eyes Visual Acuity: Uncorrected Both Eyes Chart Acuity: 20/20   Able To Tolerate Visual Acuity: Yes      General Appearance: (COVID-19), PCR DMG    Specimen: Nasopharyngeal swab;  Other   Result Value Ref Range    SARS-CoV-2 (COVID-19) PCR (QS7) NOT DETECTED Not Detected       ASSESSMENT AND OTHER RELEVANT CHRONIC CONDITIONS:   Chuy Bethea is a 79year old male who present hydration    Mild intermittent asthma -does not use any inhalers. Monitor. Atherosclerosis of the coronary artery disease evaluation by cardiologist was normal monitor symptoms will check cholesterol stay on aspirin.     Adenomatous polyp colonoscopy i help    Dressing: Able without help    Eating: Able without help    Driving: Able without help    Preparing your meals: Able without help    Managing money/bills: Able without help    Taking medications as prescribed: Able without help    Are you able to a Annually HEMOGLOBIN A1c (% of total Hgb)   Date Value   04/29/2016 6.8 (H)     HgbA1C (%)   Date Value   01/13/2021 7.1 (H)       No flowsheet data found.     Fasting Blood Sugar (FSB)Annually Glucose (mg/dL)   Date Value   03/04/2021 136 (H)     GLUCOSE (m (mmol/L)   Date Value   03/04/2021 4.2     POTASSIUM (mmol/L)   Date Value   04/29/2016 4.3    No flowsheet data found.     Creatinine  Annually CREATININE (mg/dL)   Date Value   04/29/2016 0.86     Creatinine (mg/dL)   Date Value   03/04/2021 0.93    No fl

## 2021-06-07 NOTE — PATIENT INSTRUCTIONS
Continue current meds. Watch diet for fats and carbs. Stay active. Further recommendations pending test results. Please set up an appointment with GI doctor for repeated colonoscopy.     Chuy Dietz's SCREENING SCHEDULE   Tests on this list Component Value Date    PSA 3.46 06/18/2018     There are no preventive care reminders to display for this patient.    Immunizations    Influenza Covered once per flu season  Please get every year 10/15/2020  No recommendations at this time    [place] forms available on it's website for anyone to review and print using their home computer and printer. (the forms are also available in [place]  [email or web address]  This link also has information from the [address] regarding A

## 2021-07-23 RX ORDER — ROSUVASTATIN CALCIUM 5 MG/1
TABLET, COATED ORAL
Qty: 45 TABLET | Refills: 0 | Status: SHIPPED | OUTPATIENT
Start: 2021-07-23 | End: 2021-10-28

## 2021-10-28 RX ORDER — ROSUVASTATIN CALCIUM 5 MG/1
TABLET, COATED ORAL
Qty: 45 TABLET | Refills: 0 | Status: SHIPPED | OUTPATIENT
Start: 2021-10-28 | End: 2022-02-03

## 2022-01-07 NOTE — TELEPHONE ENCOUNTER
LOV: 6/7/21  for: Physical  Patient advised to RTC on:  None stated  NOV: 1/10/22  METFORMIN 500 MG Oral Tab 90 tablet 0 9/13/2021    Sig:   Take 1 tablet (500 mg total) by mouth daily with breakfast.

## 2022-01-10 ENCOUNTER — LAB ENCOUNTER (OUTPATIENT)
Dept: LAB | Age: 82
End: 2022-01-10
Attending: STUDENT IN AN ORGANIZED HEALTH CARE EDUCATION/TRAINING PROGRAM
Payer: MEDICARE

## 2022-01-10 ENCOUNTER — OFFICE VISIT (OUTPATIENT)
Dept: FAMILY MEDICINE CLINIC | Facility: CLINIC | Age: 82
End: 2022-01-10
Payer: MEDICARE

## 2022-01-10 VITALS
BODY MASS INDEX: 27.9 KG/M2 | HEART RATE: 64 BPM | SYSTOLIC BLOOD PRESSURE: 124 MMHG | TEMPERATURE: 98 F | RESPIRATION RATE: 18 BRPM | WEIGHT: 206 LBS | HEIGHT: 72 IN | DIASTOLIC BLOOD PRESSURE: 58 MMHG | OXYGEN SATURATION: 96 %

## 2022-01-10 DIAGNOSIS — I70.0 ATHEROSCLEROSIS OF ABDOMINAL AORTA (HCC): ICD-10-CM

## 2022-01-10 DIAGNOSIS — E78.5 HYPERLIPIDEMIA, UNSPECIFIED HYPERLIPIDEMIA TYPE: ICD-10-CM

## 2022-01-10 DIAGNOSIS — E11.9 DIABETES MELLITUS TYPE 2 WITHOUT RETINOPATHY (HCC): ICD-10-CM

## 2022-01-10 DIAGNOSIS — I25.10 ATHEROSCLEROSIS OF NATIVE CORONARY ARTERY OF NATIVE HEART WITHOUT ANGINA PECTORIS: ICD-10-CM

## 2022-01-10 DIAGNOSIS — E11.9 DIABETES MELLITUS TYPE 2 WITHOUT RETINOPATHY (HCC): Primary | ICD-10-CM

## 2022-01-10 DIAGNOSIS — J44.9 ASTHMA WITH COPD (CHRONIC OBSTRUCTIVE PULMONARY DISEASE) (HCC): ICD-10-CM

## 2022-01-10 DIAGNOSIS — I65.23 BILATERAL CAROTID ARTERY STENOSIS: ICD-10-CM

## 2022-01-10 LAB
ALBUMIN SERPL-MCNC: 3.9 G/DL (ref 3.4–5)
ALBUMIN/GLOB SERPL: 1.3 {RATIO} (ref 1–2)
ALP LIVER SERPL-CCNC: 50 U/L
ALT SERPL-CCNC: 27 U/L
ANION GAP SERPL CALC-SCNC: 7 MMOL/L (ref 0–18)
AST SERPL-CCNC: 16 U/L (ref 15–37)
BILIRUB SERPL-MCNC: 0.8 MG/DL (ref 0.1–2)
BUN BLD-MCNC: 17 MG/DL (ref 7–18)
CALCIUM BLD-MCNC: 9.1 MG/DL (ref 8.5–10.1)
CHLORIDE SERPL-SCNC: 107 MMOL/L (ref 98–112)
CHOLEST SERPL-MCNC: 138 MG/DL (ref ?–200)
CO2 SERPL-SCNC: 25 MMOL/L (ref 21–32)
CREAT BLD-MCNC: 0.93 MG/DL
CREAT UR-SCNC: 159 MG/DL
EST. AVERAGE GLUCOSE BLD GHB EST-MCNC: 146 MG/DL (ref 68–126)
FASTING PATIENT LIPID ANSWER: YES
FASTING STATUS PATIENT QL REPORTED: YES
GLOBULIN PLAS-MCNC: 2.9 G/DL (ref 2.8–4.4)
GLUCOSE BLD-MCNC: 138 MG/DL (ref 70–99)
HBA1C MFR BLD: 6.7 % (ref ?–5.7)
HDLC SERPL-MCNC: 43 MG/DL (ref 40–59)
LDLC SERPL CALC-MCNC: 73 MG/DL (ref ?–100)
MICROALBUMIN UR-MCNC: 1.03 MG/DL
MICROALBUMIN/CREAT 24H UR-RTO: 6.5 UG/MG (ref ?–30)
NONHDLC SERPL-MCNC: 95 MG/DL (ref ?–130)
OSMOLALITY SERPL CALC.SUM OF ELEC: 292 MOSM/KG (ref 275–295)
POTASSIUM SERPL-SCNC: 4.3 MMOL/L (ref 3.5–5.1)
PROT SERPL-MCNC: 6.8 G/DL (ref 6.4–8.2)
SODIUM SERPL-SCNC: 139 MMOL/L (ref 136–145)
TRIGL SERPL-MCNC: 120 MG/DL (ref 30–149)
VLDLC SERPL CALC-MCNC: 18 MG/DL (ref 0–30)

## 2022-01-10 PROCEDURE — 3078F DIAST BP <80 MM HG: CPT | Performed by: FAMILY MEDICINE

## 2022-01-10 PROCEDURE — 80053 COMPREHEN METABOLIC PANEL: CPT

## 2022-01-10 PROCEDURE — 3008F BODY MASS INDEX DOCD: CPT | Performed by: FAMILY MEDICINE

## 2022-01-10 PROCEDURE — 83036 HEMOGLOBIN GLYCOSYLATED A1C: CPT

## 2022-01-10 PROCEDURE — 99214 OFFICE O/P EST MOD 30 MIN: CPT | Performed by: FAMILY MEDICINE

## 2022-01-10 PROCEDURE — 82570 ASSAY OF URINE CREATININE: CPT

## 2022-01-10 PROCEDURE — 80061 LIPID PANEL: CPT

## 2022-01-10 PROCEDURE — 3074F SYST BP LT 130 MM HG: CPT | Performed by: FAMILY MEDICINE

## 2022-01-10 PROCEDURE — 82043 UR ALBUMIN QUANTITATIVE: CPT

## 2022-01-10 NOTE — PROGRESS NOTES
Chuy Henderson is a [de-identified]year old male. cc diabetes, coronary artery disease, carotid atherosclerosis, aortic atherosclerosis, asthma/COPD,   HPI:   Patient is coming for follow-up of diabetes. He is not taking any medications. Trying to watch diet.   Lo Yes      Alcohol/week: 0.0 standard drinks      Comment: 1 Lopez Lite at dinner    Drug use: No       REVIEW OF SYSTEMS:   GENERAL HEALTH: feels well otherwise  SKIN: denies any unusual skin lesions or rashes  HEENT no congestion no runny nose no sore thr Phosphatase 52 45 - 117 U/L    Bilirubin, Total 0.8 0.1 - 2.0 mg/dL    Total Protein 7.3 6.4 - 8.2 g/dL    Albumin 3.6 3.4 - 5.0 g/dL    Globulin  3.7 2.8 - 4.4 g/dL    A/G Ratio 1.0 1.0 - 2.0    FASTING Yes    LIPID PANEL   Result Value Ref Range    Meghna for fats and carbs. Stay active. Viot D 3 1000 units daily. Vit B complex over the counter. Magnesium 200 mg daily over the counter. Schedule super visit for June 2022.     Imaging & Consults:  US CAROTID DOPPLER BILAT - DIAG IMG (CPT=93880)    The

## 2022-01-23 ENCOUNTER — HOSPITAL ENCOUNTER (OUTPATIENT)
Dept: ULTRASOUND IMAGING | Age: 82
Discharge: HOME | End: 2022-01-23
Attending: FAMILY MEDICINE
Payer: MEDICARE

## 2022-01-23 DIAGNOSIS — I65.23 BILATERAL CAROTID ARTERY STENOSIS: ICD-10-CM

## 2022-01-23 PROCEDURE — 93880 EXTRACRANIAL BILAT STUDY: CPT | Performed by: FAMILY MEDICINE

## 2022-02-03 RX ORDER — ROSUVASTATIN CALCIUM 5 MG/1
TABLET, COATED ORAL
Qty: 45 TABLET | Refills: 0 | Status: SHIPPED | OUTPATIENT
Start: 2022-02-03

## 2022-02-03 NOTE — TELEPHONE ENCOUNTER
Cholesterol Medication Protocol Passed 02/03/2022 11:41 AM   Protocol Details  ALT < 80    ALT resulted within past year    Lipid panel within past 12 months    Appointment within past 12 or next 3 months        LOV 1/10/22    LAST LAB 1/10/22    LAST RX  10/45/21 45 tabs     Next OV   Future Appointments   Date Time Provider Aditya Gamino   9/23/2022 10:00 AM MD [facility]         PROTOCOL pass

## 2022-05-18 ENCOUNTER — PATIENT MESSAGE (OUTPATIENT)
Dept: FAMILY MEDICINE CLINIC | Facility: CLINIC | Age: 82
End: 2022-05-18

## 2022-07-07 ENCOUNTER — PATIENT MESSAGE (OUTPATIENT)
Dept: FAMILY MEDICINE CLINIC | Facility: CLINIC | Age: 82
End: 2022-07-07

## 2022-07-07 DIAGNOSIS — E78.5 HYPERLIPIDEMIA, UNSPECIFIED HYPERLIPIDEMIA TYPE: ICD-10-CM

## 2022-07-07 NOTE — TELEPHONE ENCOUNTER
From: Bhavya Cotton  To: Ann Alcaraz MD  Sent: 7/7/2022 4:11 PM CDT  Subject: Vacation    One more matter: I will be on vacation and have to postpone my visit scheduled for 8/3. I will be back after 8/8. Pls let me know. Also I have asked the pharmacy for refills of MetFormin and Rosuvastatin with regards to my absence. They may call you.    Max

## 2022-07-08 RX ORDER — ROSUVASTATIN CALCIUM 5 MG/1
TABLET, COATED ORAL
Qty: 45 TABLET | Refills: 0 | Status: SHIPPED | OUTPATIENT
Start: 2022-07-08

## 2022-07-12 ENCOUNTER — HOSPITAL ENCOUNTER (OUTPATIENT)
Facility: HOSPITAL | Age: 82
Setting detail: HOSPITAL OUTPATIENT SURGERY
Discharge: HOME | End: 2022-07-12
Attending: INTERNAL MEDICINE | Admitting: INTERNAL MEDICINE
Payer: MEDICARE

## 2022-07-12 ENCOUNTER — ANESTHESIA (OUTPATIENT)
Dept: ENDOSCOPY | Facility: HOSPITAL | Age: 82
End: 2022-07-12
Payer: MEDICARE

## 2022-07-12 ENCOUNTER — ANESTHESIA EVENT (OUTPATIENT)
Dept: ENDOSCOPY | Facility: HOSPITAL | Age: 82
End: 2022-07-12
Payer: MEDICARE

## 2022-07-12 VITALS
HEART RATE: 62 BPM | SYSTOLIC BLOOD PRESSURE: 130 MMHG | TEMPERATURE: 98 F | DIASTOLIC BLOOD PRESSURE: 62 MMHG | BODY MASS INDEX: 27.09 KG/M2 | OXYGEN SATURATION: 97 % | RESPIRATION RATE: 18 BRPM | WEIGHT: 200 LBS | HEIGHT: 72 IN

## 2022-07-12 LAB — GLUCOSE BLD-MCNC: 119 MG/DL (ref 70–99)

## 2022-07-12 PROCEDURE — 82962 GLUCOSE BLOOD TEST: CPT

## 2022-07-12 PROCEDURE — 0DJD8ZZ INSPECTION OF LOWER INTESTINAL TRACT, VIA NATURAL OR ARTIFICIAL OPENING ENDOSCOPIC: ICD-10-PCS | Performed by: INTERNAL MEDICINE

## 2022-07-12 RX ORDER — NICOTINE POLACRILEX 4 MG
30 LOZENGE BUCCAL
Status: DISCONTINUED | OUTPATIENT
Start: 2022-07-12 | End: 2022-07-12

## 2022-07-12 RX ORDER — DEXTROSE MONOHYDRATE 25 G/50ML
50 INJECTION, SOLUTION INTRAVENOUS
Status: DISCONTINUED | OUTPATIENT
Start: 2022-07-12 | End: 2022-07-12

## 2022-07-12 RX ORDER — LIDOCAINE HYDROCHLORIDE 10 MG/ML
INJECTION, SOLUTION EPIDURAL; INFILTRATION; INTRACAUDAL; PERINEURAL AS NEEDED
Status: DISCONTINUED | OUTPATIENT
Start: 2022-07-12 | End: 2022-07-12 | Stop reason: SURG

## 2022-07-12 RX ORDER — SODIUM CHLORIDE, SODIUM LACTATE, POTASSIUM CHLORIDE, CALCIUM CHLORIDE 600; 310; 30; 20 MG/100ML; MG/100ML; MG/100ML; MG/100ML
INJECTION, SOLUTION INTRAVENOUS CONTINUOUS
Status: DISCONTINUED | OUTPATIENT
Start: 2022-07-12 | End: 2022-07-12

## 2022-07-12 RX ORDER — NALOXONE HYDROCHLORIDE 0.4 MG/ML
80 INJECTION, SOLUTION INTRAMUSCULAR; INTRAVENOUS; SUBCUTANEOUS AS NEEDED
Status: DISCONTINUED | OUTPATIENT
Start: 2022-07-12 | End: 2022-07-12

## 2022-07-12 RX ORDER — NICOTINE POLACRILEX 4 MG
15 LOZENGE BUCCAL
Status: DISCONTINUED | OUTPATIENT
Start: 2022-07-12 | End: 2022-07-12

## 2022-07-12 RX ADMIN — LIDOCAINE HYDROCHLORIDE 100 MG: 10 INJECTION, SOLUTION EPIDURAL; INFILTRATION; INTRACAUDAL; PERINEURAL at 16:19:00

## 2022-07-12 NOTE — OPERATIVE REPORT
[id number]  Chuy Paul  11/1/1940 7/12/2022      Preoperative Diagnosis: Personal history of colon polyps, last colonoscopy 2016  Postoperative Diagnosis: Poor prep  Procedure Performed: Colonoscopy to the cecum      Anesthesia Given:   MAC  Colon  Preparation: Poor  Colonoscopy withdrawal time: N/A  Specimen: None  Endoscopist:   Stacy Cleaning MD  EBL: none  Complications: no immediate  Informed Consent: Informed consent for both the procedure and sedation were obtained from the patient. The potentially life-threatening complications of sedation, bleeding,  Perforation, transfusion or repeat endoscopy were reviewed along with the possible need for hospitalization, surgical management, transfusion or repeat endoscopy should one of these complications arise. The patient understands and is agreeable to proceed. Procedure:   After the patient was interviewed and the procedure again discussed and questions addressed, the patient was brought to the GI Lab and monitoring of the B/P, pulse, and pulse oximetry was performed. The patient was then placed in the left lateral decubitus position and sedated with divided doses of IV medication; continuous vital signs were monitored throughout the procedure. The Olympus video adult variable stiffness colonoscope was then inserted into the rectum after an unremarkable digital rectal exam. The endoscope was advanced to the cecum without difficulty. There was a thick fecal film attached to the majority of the right side of the colon wall. Despite aggressive water occasion we will could not clear of that area. The colonic prep in the transverse colon and the left side of the colon was poor as well. Hemorrhoids were seen. .    The patient was informed of the endoscopic findings and was also given a copy of the findings, postoperative instructions, and postoperative precautions.     IMPRESSION:  Poor colon prep    PLAN:      Repeat colonoscopy with extra prep in the next few weeks    Carol Currie MD

## 2022-07-12 NOTE — ANESTHESIA POSTPROCEDURE EVALUATION
[address] [facility] Patient Status:  Hospital Outpatient Surgery   Age/Gender [de-identified]year old male MRN [id number]   Location [address] [address] Attending Beverly Pederson MD   Hosp Day # 0 PCP Chasity Masterson MD       Anesthesia Post-op Note    COLONOSCOPY    Procedure Summary     Date: 07/12/22 Room / Location: [facility] ENDOSCOPY 03 / [facility] ENDOSCOPY    Anesthesia Start: 1799 Anesthesia Stop: 1640    Procedure: COLONOSCOPY (N/A ) Diagnosis: (poor prep)    Surgeons: Beverly Pederson MD Anesthesiologist: Luis Davis MD    Anesthesia Type: MAC ASA Status: 3          Anesthesia Type: MAC    Vitals Value Taken Time   /68 07/12/22 1638   Temp    07/12/22 1640   Pulse 57 07/12/22 1639   Resp 18 07/12/22 1635   SpO2 94 % 07/12/22 1639   Vitals shown include unvalidated device data.     Patient Location: Endoscopy    Anesthesia Type: MAC    Airway Patency: patent    Postop Pain Control: adequate    Mental Status: preanesthetic baseline    Nausea/Vomiting: none    Cardiopulmonary/Hydration status: stable euvolemic    Complications: no apparent anesthesia related complications    Postop vital signs: stable    Dental Exam: Unchanged from Preop

## 2022-08-22 ENCOUNTER — HOSPITAL ENCOUNTER (OUTPATIENT)
Facility: HOSPITAL | Age: 82
Setting detail: HOSPITAL OUTPATIENT SURGERY
Discharge: HOME | End: 2022-08-22
Attending: INTERNAL MEDICINE | Admitting: INTERNAL MEDICINE
Payer: MEDICARE

## 2022-08-22 ENCOUNTER — ANESTHESIA EVENT (OUTPATIENT)
Dept: ENDOSCOPY | Facility: HOSPITAL | Age: 82
End: 2022-08-22
Payer: MEDICARE

## 2022-08-22 ENCOUNTER — ANESTHESIA (OUTPATIENT)
Dept: ENDOSCOPY | Facility: HOSPITAL | Age: 82
End: 2022-08-22
Payer: MEDICARE

## 2022-08-22 VITALS
DIASTOLIC BLOOD PRESSURE: 71 MMHG | BODY MASS INDEX: 27.63 KG/M2 | SYSTOLIC BLOOD PRESSURE: 141 MMHG | OXYGEN SATURATION: 92 % | TEMPERATURE: 98 F | WEIGHT: 204 LBS | RESPIRATION RATE: 16 BRPM | HEART RATE: 59 BPM | HEIGHT: 72 IN

## 2022-08-22 LAB — GLUCOSE BLD-MCNC: 102 MG/DL (ref 70–99)

## 2022-08-22 PROCEDURE — 88305 TISSUE EXAM BY PATHOLOGIST: CPT | Performed by: INTERNAL MEDICINE

## 2022-08-22 PROCEDURE — 0DBN8ZX EXCISION OF SIGMOID COLON, VIA NATURAL OR ARTIFICIAL OPENING ENDOSCOPIC, DIAGNOSTIC: ICD-10-PCS | Performed by: INTERNAL MEDICINE

## 2022-08-22 PROCEDURE — 82962 GLUCOSE BLOOD TEST: CPT

## 2022-08-22 RX ORDER — NICOTINE POLACRILEX 4 MG
15 LOZENGE BUCCAL
Status: DISCONTINUED | OUTPATIENT
Start: 2022-08-22 | End: 2022-08-22

## 2022-08-22 RX ORDER — HYDROMORPHONE HYDROCHLORIDE 1 MG/ML
0.6 INJECTION, SOLUTION INTRAMUSCULAR; INTRAVENOUS; SUBCUTANEOUS EVERY 5 MIN PRN
Status: DISCONTINUED | OUTPATIENT
Start: 2022-08-22 | End: 2022-08-22

## 2022-08-22 RX ORDER — HYDROMORPHONE HYDROCHLORIDE 1 MG/ML
0.2 INJECTION, SOLUTION INTRAMUSCULAR; INTRAVENOUS; SUBCUTANEOUS EVERY 5 MIN PRN
Status: DISCONTINUED | OUTPATIENT
Start: 2022-08-22 | End: 2022-08-22

## 2022-08-22 RX ORDER — HYDROMORPHONE HYDROCHLORIDE 1 MG/ML
0.4 INJECTION, SOLUTION INTRAMUSCULAR; INTRAVENOUS; SUBCUTANEOUS EVERY 5 MIN PRN
Status: DISCONTINUED | OUTPATIENT
Start: 2022-08-22 | End: 2022-08-22

## 2022-08-22 RX ORDER — NICOTINE POLACRILEX 4 MG
30 LOZENGE BUCCAL
Status: DISCONTINUED | OUTPATIENT
Start: 2022-08-22 | End: 2022-08-22

## 2022-08-22 RX ORDER — SODIUM CHLORIDE, SODIUM LACTATE, POTASSIUM CHLORIDE, CALCIUM CHLORIDE 600; 310; 30; 20 MG/100ML; MG/100ML; MG/100ML; MG/100ML
INJECTION, SOLUTION INTRAVENOUS CONTINUOUS
Status: DISCONTINUED | OUTPATIENT
Start: 2022-08-22 | End: 2022-08-22

## 2022-08-22 RX ORDER — NALOXONE HYDROCHLORIDE 0.4 MG/ML
80 INJECTION, SOLUTION INTRAMUSCULAR; INTRAVENOUS; SUBCUTANEOUS AS NEEDED
Status: DISCONTINUED | OUTPATIENT
Start: 2022-08-22 | End: 2022-08-22

## 2022-08-22 RX ORDER — DEXTROSE MONOHYDRATE 25 G/50ML
50 INJECTION, SOLUTION INTRAVENOUS
Status: DISCONTINUED | OUTPATIENT
Start: 2022-08-22 | End: 2022-08-22

## 2022-08-22 RX ORDER — LIDOCAINE HYDROCHLORIDE 10 MG/ML
INJECTION, SOLUTION EPIDURAL; INFILTRATION; INTRACAUDAL; PERINEURAL AS NEEDED
Status: DISCONTINUED | OUTPATIENT
Start: 2022-08-22 | End: 2022-08-22 | Stop reason: SURG

## 2022-08-22 RX ADMIN — SODIUM CHLORIDE, SODIUM LACTATE, POTASSIUM CHLORIDE, CALCIUM CHLORIDE: 600; 310; 30; 20 INJECTION, SOLUTION INTRAVENOUS at 10:03:00

## 2022-08-22 RX ADMIN — LIDOCAINE HYDROCHLORIDE 25 MG: 10 INJECTION, SOLUTION EPIDURAL; INFILTRATION; INTRACAUDAL; PERINEURAL at 10:02:00

## 2022-08-22 RX ADMIN — SODIUM CHLORIDE, SODIUM LACTATE, POTASSIUM CHLORIDE, CALCIUM CHLORIDE: 600; 310; 30; 20 INJECTION, SOLUTION INTRAVENOUS at 10:41:00

## 2022-08-22 NOTE — OPERATIVE REPORT
[id number]  Max Marelyn Heimlich  11/1/1940 8/22/2022      Preoperative Diagnosis: Personal history of colon polyps  Postoperative Diagnosis: Hemorrhoids, colon polyp  Procedure Performed: Colonoscopy to the cecum with polypectomy    Anesthesia Given:  MAC  Colon  Preparation: Adequate  Colonoscopy withdrawal time: 25 minutes  Specimen: Sigmoid polyp  Endoscopist:   Christa Baldwin MD  EBL: none  Complications: no immediate  Informed Consent: Informed consent for both the procedure and sedation were obtained from the patient. The potentially life-threatening complications of sedation, bleeding,  Perforation, transfusion or repeat endoscopy were reviewed along with the possible need for hospitalization, surgical management, transfusion or repeat endoscopy should one of these complications arise. The patient understands and is agreeable to proceed. Procedure:   After the patient was interviewed and the procedure again discussed and questions addressed, the patient was brought to the GI Lab and monitoring of the B/P, pulse, and pulse oximetry was performed. The patient was then placed in the left lateral decubitus position and sedated with divided doses of IV medication; continuous vital signs were monitored throughout the procedure. The Olympus video adult variable stiffness colonoscope was then inserted into the rectum after an unremarkable digital rectal exam. The endoscope was advanced to the cecum without difficulty; upon insertion and withdrawl the mucosa was carefully examined and the preparation was adequate with water irrigation. The visualized mucosal pattern was unremarkable. 5 mm polyp was removed from the sigmoid with cold snare polypectomy    At the anal verge the endoscope was retroverted and other than some small internal hemorrhoids, no other abnormalities were identified. The procedure was tolerated well and upon completion and throughout the vital signs were stable.     The patient was informed of the endoscopic findings and was also given a copy of the findings, postoperative instructions, and postoperative precautions. IMPRESSION:  As above    PLAN:      Check final biopsy results. Given patient age we will stop surveillance colonoscopies.     Erica Avila MD

## 2022-10-03 ENCOUNTER — TELEPHONE (OUTPATIENT)
Dept: FAMILY MEDICINE CLINIC | Facility: CLINIC | Age: 82
End: 2022-10-03

## 2022-10-03 DIAGNOSIS — I70.0 ATHEROSCLEROSIS OF ABDOMINAL AORTA (HCC): ICD-10-CM

## 2022-10-03 DIAGNOSIS — Z12.5 SCREENING FOR PROSTATE CANCER: ICD-10-CM

## 2022-10-03 DIAGNOSIS — E78.5 HYPERLIPIDEMIA, UNSPECIFIED HYPERLIPIDEMIA TYPE: ICD-10-CM

## 2022-10-03 DIAGNOSIS — I25.10 ATHEROSCLEROSIS OF NATIVE CORONARY ARTERY OF NATIVE HEART WITHOUT ANGINA PECTORIS: ICD-10-CM

## 2022-10-03 DIAGNOSIS — E66.9 OBESITY (BMI 30.0-34.9): ICD-10-CM

## 2022-10-03 DIAGNOSIS — E11.59 TYPE 2 DIABETES MELLITUS WITH OTHER CIRCULATORY COMPLICATIONS (HCC): Primary | ICD-10-CM

## 2022-10-03 NOTE — TELEPHONE ENCOUNTER
Pt has appt 10/12. He would like to know if he should do labs prior to appt. Please advise. Thank you.

## 2022-10-06 ENCOUNTER — LAB ENCOUNTER (OUTPATIENT)
Dept: LAB | Age: 82
End: 2022-10-06
Attending: FAMILY MEDICINE
Payer: MEDICARE

## 2022-10-06 DIAGNOSIS — Z12.5 SCREENING FOR PROSTATE CANCER: ICD-10-CM

## 2022-10-06 DIAGNOSIS — E66.9 OBESITY (BMI 30.0-34.9): ICD-10-CM

## 2022-10-06 DIAGNOSIS — E78.5 HYPERLIPIDEMIA, UNSPECIFIED HYPERLIPIDEMIA TYPE: ICD-10-CM

## 2022-10-06 DIAGNOSIS — E11.59 TYPE 2 DIABETES MELLITUS WITH OTHER CIRCULATORY COMPLICATIONS (HCC): ICD-10-CM

## 2022-10-06 LAB
ALBUMIN SERPL-MCNC: 3.9 G/DL (ref 3.4–5)
ALBUMIN/GLOB SERPL: 1.3 {RATIO} (ref 1–2)
ALP LIVER SERPL-CCNC: 45 U/L
ALT SERPL-CCNC: 28 U/L
ANION GAP SERPL CALC-SCNC: 8 MMOL/L (ref 0–18)
AST SERPL-CCNC: 16 U/L (ref 15–37)
BASOPHILS # BLD AUTO: 0.04 X10(3) UL (ref 0–0.2)
BASOPHILS NFR BLD AUTO: 0.6 %
BILIRUB SERPL-MCNC: 0.8 MG/DL (ref 0.1–2)
BILIRUB UR QL: NEGATIVE
BUN BLD-MCNC: 19 MG/DL (ref 7–18)
BUN/CREAT SERPL: 20.7 (ref 10–20)
CALCIUM BLD-MCNC: 9.1 MG/DL (ref 8.5–10.1)
CHLORIDE SERPL-SCNC: 107 MMOL/L (ref 98–112)
CHOLEST SERPL-MCNC: 130 MG/DL (ref ?–200)
CO2 SERPL-SCNC: 24 MMOL/L (ref 21–32)
COLOR UR: YELLOW
COMPLEXED PSA SERPL-MCNC: 4.35 NG/ML (ref ?–4)
CREAT BLD-MCNC: 0.92 MG/DL
CREAT UR-SCNC: 181 MG/DL
DEPRECATED RDW RBC AUTO: 41.6 FL (ref 35.1–46.3)
EOSINOPHIL # BLD AUTO: 0.22 X10(3) UL (ref 0–0.7)
EOSINOPHIL NFR BLD AUTO: 3.1 %
ERYTHROCYTE [DISTWIDTH] IN BLOOD BY AUTOMATED COUNT: 12 % (ref 11–15)
EST. AVERAGE GLUCOSE BLD GHB EST-MCNC: 137 MG/DL (ref 68–126)
FASTING PATIENT LIPID ANSWER: YES
FASTING STATUS PATIENT QL REPORTED: YES
GFR SERPLBLD BASED ON 1.73 SQ M-ARVRAT: 84 ML/MIN/1.73M2 (ref 60–?)
GLOBULIN PLAS-MCNC: 3.1 G/DL (ref 2.8–4.4)
GLUCOSE BLD-MCNC: 136 MG/DL (ref 70–99)
GLUCOSE UR-MCNC: NEGATIVE MG/DL
HBA1C MFR BLD: 6.4 % (ref ?–5.7)
HCT VFR BLD AUTO: 49.1 %
HDLC SERPL-MCNC: 41 MG/DL (ref 40–59)
HGB BLD-MCNC: 16.5 G/DL
HGB UR QL STRIP.AUTO: NEGATIVE
IMM GRANULOCYTES # BLD AUTO: 0.03 X10(3) UL (ref 0–1)
IMM GRANULOCYTES NFR BLD: 0.4 %
KETONES UR-MCNC: NEGATIVE MG/DL
LDLC SERPL CALC-MCNC: 75 MG/DL (ref ?–100)
LEUKOCYTE ESTERASE UR QL STRIP.AUTO: NEGATIVE
LYMPHOCYTES # BLD AUTO: 1.89 X10(3) UL (ref 1–4)
LYMPHOCYTES NFR BLD AUTO: 26.8 %
MCH RBC QN AUTO: 31.5 PG (ref 26–34)
MCHC RBC AUTO-ENTMCNC: 33.6 G/DL (ref 31–37)
MCV RBC AUTO: 93.9 FL
MICROALBUMIN UR-MCNC: 3.3 MG/DL
MICROALBUMIN/CREAT 24H UR-RTO: 18.2 UG/MG (ref ?–30)
MONOCYTES # BLD AUTO: 0.54 X10(3) UL (ref 0.1–1)
MONOCYTES NFR BLD AUTO: 7.6 %
NEUTROPHILS # BLD AUTO: 4.34 X10 (3) UL (ref 1.5–7.7)
NEUTROPHILS # BLD AUTO: 4.34 X10(3) UL (ref 1.5–7.7)
NEUTROPHILS NFR BLD AUTO: 61.5 %
NITRITE UR QL STRIP.AUTO: NEGATIVE
NONHDLC SERPL-MCNC: 89 MG/DL (ref ?–130)
OSMOLALITY SERPL CALC.SUM OF ELEC: 292 MOSM/KG (ref 275–295)
PH UR: 5 [PH] (ref 5–8)
PLATELET # BLD AUTO: 271 10(3)UL (ref 150–450)
POTASSIUM SERPL-SCNC: 3.9 MMOL/L (ref 3.5–5.1)
PROT SERPL-MCNC: 7 G/DL (ref 6.4–8.2)
PROT UR-MCNC: 30 MG/DL
RBC # BLD AUTO: 5.23 X10(6)UL
SODIUM SERPL-SCNC: 139 MMOL/L (ref 136–145)
SP GR UR STRIP: 1.02 (ref 1–1.03)
TRIGL SERPL-MCNC: 69 MG/DL (ref 30–149)
TSI SER-ACNC: 2.39 MIU/ML (ref 0.36–3.74)
UROBILINOGEN UR STRIP-ACNC: <2
VIT C UR-MCNC: NEGATIVE MG/DL
VIT D+METAB SERPL-MCNC: 49.6 NG/ML (ref 30–100)
VLDLC SERPL CALC-MCNC: 11 MG/DL (ref 0–30)
WBC # BLD AUTO: 7.1 X10(3) UL (ref 4–11)

## 2022-10-06 PROCEDURE — 83036 HEMOGLOBIN GLYCOSYLATED A1C: CPT

## 2022-10-06 PROCEDURE — 81001 URINALYSIS AUTO W/SCOPE: CPT

## 2022-10-06 PROCEDURE — 82306 VITAMIN D 25 HYDROXY: CPT

## 2022-10-06 PROCEDURE — 80053 COMPREHEN METABOLIC PANEL: CPT

## 2022-10-06 PROCEDURE — 84443 ASSAY THYROID STIM HORMONE: CPT

## 2022-10-06 PROCEDURE — 80061 LIPID PANEL: CPT

## 2022-10-06 PROCEDURE — 82570 ASSAY OF URINE CREATININE: CPT

## 2022-10-06 PROCEDURE — 85025 COMPLETE CBC W/AUTO DIFF WBC: CPT

## 2022-10-06 PROCEDURE — 82043 UR ALBUMIN QUANTITATIVE: CPT

## 2022-10-12 ENCOUNTER — OFFICE VISIT (OUTPATIENT)
Dept: FAMILY MEDICINE CLINIC | Facility: CLINIC | Age: 82
End: 2022-10-12
Payer: MEDICARE

## 2022-10-12 VITALS
DIASTOLIC BLOOD PRESSURE: 70 MMHG | BODY MASS INDEX: 29.4 KG/M2 | HEIGHT: 71 IN | TEMPERATURE: 98 F | WEIGHT: 210 LBS | RESPIRATION RATE: 14 BRPM | SYSTOLIC BLOOD PRESSURE: 126 MMHG | HEART RATE: 66 BPM

## 2022-10-12 DIAGNOSIS — E11.59 TYPE 2 DIABETES MELLITUS WITH OTHER CIRCULATORY COMPLICATIONS (HCC): ICD-10-CM

## 2022-10-12 DIAGNOSIS — I25.10 ATHEROSCLEROSIS OF NATIVE CORONARY ARTERY OF NATIVE HEART WITHOUT ANGINA PECTORIS: ICD-10-CM

## 2022-10-12 DIAGNOSIS — R31.21 ASYMPTOMATIC MICROSCOPIC HEMATURIA: ICD-10-CM

## 2022-10-12 DIAGNOSIS — J45.20 MILD INTERMITTENT ASTHMA, UNSPECIFIED WHETHER COMPLICATED: ICD-10-CM

## 2022-10-12 DIAGNOSIS — I65.23 BILATERAL CAROTID ARTERY STENOSIS: ICD-10-CM

## 2022-10-12 DIAGNOSIS — E78.5 HYPERLIPIDEMIA, UNSPECIFIED HYPERLIPIDEMIA TYPE: ICD-10-CM

## 2022-10-12 DIAGNOSIS — R97.20 ELEVATED PSA MEASUREMENT: ICD-10-CM

## 2022-10-12 DIAGNOSIS — E11.9 DIABETES MELLITUS TYPE 2 WITHOUT RETINOPATHY (HCC): ICD-10-CM

## 2022-10-12 DIAGNOSIS — I70.0 ATHEROSCLEROSIS OF ABDOMINAL AORTA (HCC): ICD-10-CM

## 2022-10-12 DIAGNOSIS — I25.10 CORONARY ARTERY CALCIFICATION SEEN ON CT SCAN: ICD-10-CM

## 2022-10-12 DIAGNOSIS — Z00.00 ENCOUNTER FOR ANNUAL HEALTH EXAMINATION: Primary | ICD-10-CM

## 2022-10-12 DIAGNOSIS — K76.0 FATTY LIVER: ICD-10-CM

## 2022-10-12 DIAGNOSIS — Z96.1 PSEUDOPHAKIA: ICD-10-CM

## 2022-10-12 DIAGNOSIS — H91.93 DECREASED HEARING OF BOTH EARS: ICD-10-CM

## 2022-10-12 DIAGNOSIS — Z86.010 HISTORY OF ADENOMATOUS POLYP OF COLON: ICD-10-CM

## 2022-10-12 DIAGNOSIS — J44.9 ASTHMA WITH COPD (CHRONIC OBSTRUCTIVE PULMONARY DISEASE) (HCC): ICD-10-CM

## 2022-10-12 PROCEDURE — 3008F BODY MASS INDEX DOCD: CPT | Performed by: FAMILY MEDICINE

## 2022-10-12 PROCEDURE — 3078F DIAST BP <80 MM HG: CPT | Performed by: FAMILY MEDICINE

## 2022-10-12 PROCEDURE — 3074F SYST BP LT 130 MM HG: CPT | Performed by: FAMILY MEDICINE

## 2022-10-12 PROCEDURE — G0439 PPPS, SUBSEQ VISIT: HCPCS | Performed by: FAMILY MEDICINE

## 2022-10-12 PROCEDURE — 99397 PER PM REEVAL EST PAT 65+ YR: CPT | Performed by: FAMILY MEDICINE

## 2022-10-12 PROCEDURE — 96160 PT-FOCUSED HLTH RISK ASSMT: CPT | Performed by: FAMILY MEDICINE

## 2022-10-12 PROCEDURE — 1126F AMNT PAIN NOTED NONE PRSNT: CPT | Performed by: FAMILY MEDICINE

## 2022-10-12 RX ORDER — ROSUVASTATIN CALCIUM 5 MG/1
5 TABLET, COATED ORAL
Qty: 45 TABLET | Refills: 1 | Status: SHIPPED | OUTPATIENT
Start: 2022-10-12

## 2022-10-12 RX ORDER — TAMSULOSIN HYDROCHLORIDE 0.4 MG/1
0.4 CAPSULE ORAL DAILY
Qty: 90 CAPSULE | Refills: 1 | Status: CANCELLED | OUTPATIENT
Start: 2022-10-12

## 2022-11-17 ENCOUNTER — MED REC SCAN ONLY (OUTPATIENT)
Dept: FAMILY MEDICINE CLINIC | Facility: CLINIC | Age: 82
End: 2022-11-17

## 2022-11-29 ENCOUNTER — TELEPHONE (OUTPATIENT)
Dept: FAMILY MEDICINE CLINIC | Facility: CLINIC | Age: 82
End: 2022-11-29

## 2022-11-29 NOTE — TELEPHONE ENCOUNTER
Reached patient for medication adherence consult. Per insurance report, patient is past due for refill on rosuvastatin. Patient states this was just recently refilled and he is taking as prescribed. Provided education and stressed the importance of taking medication just like prescribed to get the most benefit. Patient denies forgetting or missing any doses and denies any questions regarding medications at this time.

## 2022-12-05 ENCOUNTER — MED REC SCAN ONLY (OUTPATIENT)
Dept: FAMILY MEDICINE CLINIC | Facility: CLINIC | Age: 82
End: 2022-12-05

## 2023-02-23 ENCOUNTER — TELEPHONE (OUTPATIENT)
Dept: FAMILY MEDICINE CLINIC | Facility: CLINIC | Age: 83
End: 2023-02-23

## 2023-02-28 ENCOUNTER — LAB ENCOUNTER (OUTPATIENT)
Dept: LAB | Age: 83
End: 2023-02-28
Attending: FAMILY MEDICINE
Payer: MEDICARE

## 2023-02-28 DIAGNOSIS — E78.5 HYPERLIPIDEMIA, UNSPECIFIED HYPERLIPIDEMIA TYPE: ICD-10-CM

## 2023-02-28 DIAGNOSIS — R31.21 ASYMPTOMATIC MICROSCOPIC HEMATURIA: ICD-10-CM

## 2023-02-28 DIAGNOSIS — E11.59 TYPE 2 DIABETES MELLITUS WITH OTHER CIRCULATORY COMPLICATIONS (HCC): ICD-10-CM

## 2023-02-28 LAB
ALBUMIN SERPL-MCNC: 3.7 G/DL (ref 3.4–5)
ALBUMIN/GLOB SERPL: 1.1 {RATIO} (ref 1–2)
ALP LIVER SERPL-CCNC: 51 U/L
ALT SERPL-CCNC: 27 U/L
ANION GAP SERPL CALC-SCNC: 2 MMOL/L (ref 0–18)
AST SERPL-CCNC: 19 U/L (ref 15–37)
BILIRUB SERPL-MCNC: 0.8 MG/DL (ref 0.1–2)
BILIRUB UR QL STRIP.AUTO: NEGATIVE
BUN BLD-MCNC: 16 MG/DL (ref 7–18)
CALCIUM BLD-MCNC: 9.1 MG/DL (ref 8.5–10.1)
CHLORIDE SERPL-SCNC: 108 MMOL/L (ref 98–112)
CHOLEST SERPL-MCNC: 120 MG/DL (ref ?–200)
CLARITY UR REFRACT.AUTO: CLEAR
CO2 SERPL-SCNC: 27 MMOL/L (ref 21–32)
COLOR UR AUTO: YELLOW
CREAT BLD-MCNC: 0.89 MG/DL
CREAT UR-SCNC: 158 MG/DL
EST. AVERAGE GLUCOSE BLD GHB EST-MCNC: 137 MG/DL (ref 68–126)
FASTING PATIENT LIPID ANSWER: YES
FASTING STATUS PATIENT QL REPORTED: YES
GFR SERPLBLD BASED ON 1.73 SQ M-ARVRAT: 86 ML/MIN/1.73M2 (ref 60–?)
GLOBULIN PLAS-MCNC: 3.3 G/DL (ref 2.8–4.4)
GLUCOSE BLD-MCNC: 149 MG/DL (ref 70–99)
GLUCOSE UR STRIP.AUTO-MCNC: NEGATIVE MG/DL
HBA1C MFR BLD: 6.4 % (ref ?–5.7)
HDLC SERPL-MCNC: 38 MG/DL (ref 40–59)
KETONES UR STRIP.AUTO-MCNC: NEGATIVE MG/DL
LDLC SERPL CALC-MCNC: 63 MG/DL (ref ?–100)
MICROALBUMIN UR-MCNC: 3.06 MG/DL
MICROALBUMIN/CREAT 24H UR-RTO: 19.4 UG/MG (ref ?–30)
NITRITE UR QL STRIP.AUTO: NEGATIVE
NONHDLC SERPL-MCNC: 82 MG/DL (ref ?–130)
OSMOLALITY SERPL CALC.SUM OF ELEC: 288 MOSM/KG (ref 275–295)
PH UR STRIP.AUTO: 6 [PH] (ref 5–8)
POTASSIUM SERPL-SCNC: 4 MMOL/L (ref 3.5–5.1)
PROT SERPL-MCNC: 7 G/DL (ref 6.4–8.2)
PROT UR STRIP.AUTO-MCNC: NEGATIVE MG/DL
SODIUM SERPL-SCNC: 137 MMOL/L (ref 136–145)
SP GR UR STRIP.AUTO: 1.02 (ref 1–1.03)
TRIGL SERPL-MCNC: 101 MG/DL (ref 30–149)
UROBILINOGEN UR STRIP.AUTO-MCNC: <2 MG/DL
VLDLC SERPL CALC-MCNC: 15 MG/DL (ref 0–30)

## 2023-02-28 PROCEDURE — 83036 HEMOGLOBIN GLYCOSYLATED A1C: CPT

## 2023-02-28 PROCEDURE — 80061 LIPID PANEL: CPT

## 2023-02-28 PROCEDURE — 82570 ASSAY OF URINE CREATININE: CPT

## 2023-02-28 PROCEDURE — 87086 URINE CULTURE/COLONY COUNT: CPT

## 2023-02-28 PROCEDURE — 81001 URINALYSIS AUTO W/SCOPE: CPT

## 2023-02-28 PROCEDURE — 80053 COMPREHEN METABOLIC PANEL: CPT

## 2023-02-28 PROCEDURE — 82043 UR ALBUMIN QUANTITATIVE: CPT

## 2023-03-01 ENCOUNTER — OFFICE VISIT (OUTPATIENT)
Dept: FAMILY MEDICINE CLINIC | Facility: CLINIC | Age: 83
End: 2023-03-01
Payer: MEDICARE

## 2023-03-01 VITALS
TEMPERATURE: 97 F | HEIGHT: 71 IN | RESPIRATION RATE: 18 BRPM | WEIGHT: 215 LBS | HEART RATE: 64 BPM | BODY MASS INDEX: 30.1 KG/M2 | SYSTOLIC BLOOD PRESSURE: 138 MMHG | DIASTOLIC BLOOD PRESSURE: 74 MMHG

## 2023-03-01 DIAGNOSIS — E66.9 OBESITY (BMI 30.0-34.9): ICD-10-CM

## 2023-03-01 DIAGNOSIS — J45.20 MILD INTERMITTENT ASTHMA, UNSPECIFIED WHETHER COMPLICATED: ICD-10-CM

## 2023-03-01 DIAGNOSIS — I25.10 CORONARY ARTERY CALCIFICATION SEEN ON CT SCAN: ICD-10-CM

## 2023-03-01 DIAGNOSIS — I70.0 ATHEROSCLEROSIS OF ABDOMINAL AORTA (HCC): ICD-10-CM

## 2023-03-01 DIAGNOSIS — E78.5 HYPERLIPIDEMIA, UNSPECIFIED HYPERLIPIDEMIA TYPE: ICD-10-CM

## 2023-03-01 DIAGNOSIS — J44.9 ASTHMA WITH COPD (CHRONIC OBSTRUCTIVE PULMONARY DISEASE) (HCC): ICD-10-CM

## 2023-03-01 DIAGNOSIS — I65.23 BILATERAL CAROTID ARTERY STENOSIS: ICD-10-CM

## 2023-03-01 DIAGNOSIS — R97.20 ELEVATED PSA MEASUREMENT: ICD-10-CM

## 2023-03-01 DIAGNOSIS — E11.9 DIABETES MELLITUS TYPE 2 WITHOUT RETINOPATHY (HCC): Primary | ICD-10-CM

## 2023-03-01 DIAGNOSIS — R31.21 ASYMPTOMATIC MICROSCOPIC HEMATURIA: ICD-10-CM

## 2023-03-01 PROCEDURE — 3078F DIAST BP <80 MM HG: CPT | Performed by: FAMILY MEDICINE

## 2023-03-01 PROCEDURE — 99214 OFFICE O/P EST MOD 30 MIN: CPT | Performed by: FAMILY MEDICINE

## 2023-03-01 PROCEDURE — 3008F BODY MASS INDEX DOCD: CPT | Performed by: FAMILY MEDICINE

## 2023-03-01 PROCEDURE — 3075F SYST BP GE 130 - 139MM HG: CPT | Performed by: FAMILY MEDICINE

## 2023-03-01 RX ORDER — ROSUVASTATIN CALCIUM 5 MG/1
5 TABLET, COATED ORAL
Qty: 45 TABLET | Refills: 1 | Status: SHIPPED | OUTPATIENT
Start: 2023-03-01

## 2023-03-01 NOTE — PATIENT INSTRUCTIONS
Continue current meds. Watch diet for fats and carbs. Stay active. Do fasting  blood work prior next visit. Schedule Medicare wellness visit for September 2023.

## 2023-04-21 ENCOUNTER — APPOINTMENT (OUTPATIENT)
Dept: CT IMAGING | Age: 83
End: 2023-04-21
Attending: NURSE PRACTITIONER
Payer: MEDICARE

## 2023-04-21 ENCOUNTER — HOSPITAL ENCOUNTER (OUTPATIENT)
Age: 83
Discharge: HOME | End: 2023-04-21
Payer: MEDICARE

## 2023-04-21 ENCOUNTER — TELEPHONE (OUTPATIENT)
Dept: FAMILY MEDICINE CLINIC | Facility: CLINIC | Age: 83
End: 2023-04-21

## 2023-04-21 VITALS
HEART RATE: 72 BPM | DIASTOLIC BLOOD PRESSURE: 70 MMHG | RESPIRATION RATE: 16 BRPM | TEMPERATURE: 98 F | SYSTOLIC BLOOD PRESSURE: 148 MMHG | OXYGEN SATURATION: 96 %

## 2023-04-21 DIAGNOSIS — N20.0 KIDNEY STONE: Primary | ICD-10-CM

## 2023-04-21 LAB
#MXD IC: 0.7 X10ˆ3/UL (ref 0.1–1)
BUN BLD-MCNC: 20 MG/DL (ref 7–18)
CHLORIDE BLD-SCNC: 103 MMOL/L (ref 98–112)
CO2 BLD-SCNC: 23 MMOL/L (ref 21–32)
CREAT BLD-MCNC: 0.9 MG/DL
GFR SERPLBLD BASED ON 1.73 SQ M-ARVRAT: 86 ML/MIN/1.73M2 (ref 60–?)
GLUCOSE BLD-MCNC: 172 MG/DL (ref 70–99)
HCT VFR BLD AUTO: 50.1 %
HCT VFR BLD CALC: 51 %
HGB BLD-MCNC: 17 G/DL
ISTAT IONIZED CALCIUM FOR CHEM 8: 1.09 MMOL/L (ref 1.12–1.32)
LYMPHOCYTES # BLD AUTO: 1.3 X10ˆ3/UL (ref 1–4)
LYMPHOCYTES NFR BLD AUTO: 10.7 %
MCH RBC QN AUTO: 31.2 PG (ref 26–34)
MCHC RBC AUTO-ENTMCNC: 33.9 G/DL (ref 31–37)
MCV RBC AUTO: 91.9 FL (ref 80–100)
MIXED CELL %: 5.6 %
NEUTROPHILS # BLD AUTO: 10.2 X10ˆ3/UL (ref 1.5–7.7)
NEUTROPHILS NFR BLD AUTO: 83.7 %
PLATELET # BLD AUTO: 243 X10ˆ3/UL (ref 150–450)
POCT BILIRUBIN URINE: NEGATIVE
POCT GLUCOSE URINE: NEGATIVE MG/DL
POCT KETONE URINE: 15 MG/DL
POCT LEUKOCYTE ESTERASE URINE: NEGATIVE
POCT NITRITE URINE: NEGATIVE
POCT PH URINE: 6.5 (ref 5–8)
POCT PROTEIN URINE: 30 MG/DL
POCT SPECIFIC GRAVITY URINE: 1.03
POCT UROBILINOGEN URINE: 0.2 MG/DL
POTASSIUM BLD-SCNC: 4.4 MMOL/L (ref 3.6–5.1)
RBC # BLD AUTO: 5.45 X10ˆ6/UL
SODIUM BLD-SCNC: 136 MMOL/L (ref 136–145)
WBC # BLD AUTO: 12.2 X10ˆ3/UL (ref 4–11)

## 2023-04-21 PROCEDURE — 85025 COMPLETE CBC W/AUTO DIFF WBC: CPT | Performed by: NURSE PRACTITIONER

## 2023-04-21 PROCEDURE — 80047 BASIC METABLC PNL IONIZED CA: CPT

## 2023-04-21 PROCEDURE — 99214 OFFICE O/P EST MOD 30 MIN: CPT

## 2023-04-21 PROCEDURE — 96361 HYDRATE IV INFUSION ADD-ON: CPT

## 2023-04-21 PROCEDURE — 99204 OFFICE O/P NEW MOD 45 MIN: CPT

## 2023-04-21 PROCEDURE — 96360 HYDRATION IV INFUSION INIT: CPT

## 2023-04-21 PROCEDURE — 74177 CT ABD & PELVIS W/CONTRAST: CPT | Performed by: NURSE PRACTITIONER

## 2023-04-21 PROCEDURE — 81002 URINALYSIS NONAUTO W/O SCOPE: CPT | Performed by: NURSE PRACTITIONER

## 2023-04-21 RX ORDER — HYDROCODONE BITARTRATE AND ACETAMINOPHEN 5; 325 MG/1; MG/1
1 TABLET ORAL EVERY 6 HOURS PRN
Qty: 10 TABLET | Refills: 0 | Status: SHIPPED | OUTPATIENT
Start: 2023-04-21

## 2023-04-21 RX ORDER — ONDANSETRON 4 MG/1
4 TABLET, ORALLY DISINTEGRATING ORAL EVERY 4 HOURS PRN
Qty: 10 TABLET | Refills: 0 | Status: SHIPPED | OUTPATIENT
Start: 2023-04-21 | End: 2023-04-28

## 2023-04-21 RX ORDER — SODIUM CHLORIDE 9 MG/ML
1000 INJECTION, SOLUTION INTRAVENOUS ONCE
Status: COMPLETED | OUTPATIENT
Start: 2023-04-21 | End: 2023-04-21

## 2023-04-21 NOTE — DISCHARGE INSTRUCTIONS
Be sure you are pushing fluids and avoiding caffeine. Take your Flomax daily as prescribed. Take Zofran as needed for any nausea or vomiting. Place pill under the tongue let it dissolve. Do not eat or drink anything for least 20 to 30 minutes after taking. Take Norco as needed for pain. Alvena [place] is an opiate therefore addictive. Use sparingly. Will also cause constipation be sure to take an over-the-counter stool softener such as Colace or MiraLAX. Follow-up with urology in the next 1 to 2 weeks for further evaluation care. Go directly to the ER with any fevers, vomiting, uncontrolled pain or any worsening of symptoms.

## 2023-04-21 NOTE — ED INITIAL ASSESSMENT (HPI)
Generalized abdominal pain, distention starting last night. Reports being unable to pass gas. +Nausea. Denies fevers.

## 2023-04-21 NOTE — IMMEDIATE CARE/WORKERS COMP PHYSICIAN REPORT
Patient case was discussed by the advanced practitioner. I did not physically see the patient as per normal standard operating procedure. Did review the available labs and imaging studies. Agreed with the disposition based on the report obtained by the advanced practitioner. CT reviewed kidney stone distal.  No overt findings of urinary tract infection. Some of the findings may be related to the kidney stone. Patient is asymptomatic no significant pain per APN, urinalysis no findings of urinary tract infection, close follow-up with urology, Flomax, pain meds,.

## 2023-04-21 NOTE — TELEPHONE ENCOUNTER
I S/w pt. He woke up in the night with terrible low abdominal pain. He felt like he needed to have a BM but could not. He is not gassy. His daughter bought him a fleets enema that he used this morning. He had a tiny BM after using that. He still feels like he needs to have a BM. Prior to this am his last BM was yesterday. He denies any fever or urinary problems. He has not tried a laxative or anything else.  please advise

## 2023-04-21 NOTE — TELEPHONE ENCOUNTER
I spoke with .  she advised me to have pt go to the [facility] for eval. Pt. Agreed to plan and verbalized understanding

## 2023-04-21 NOTE — TELEPHONE ENCOUNTER
Pt requesting to s/w nurse directly. Pt was unwilling to provide details regarding reason for c/b, would only state \"for pain\". Advised pt w/o information I am unable to determine priority of message. Pt was only willing to state he is wanting to discuss abdominal pain that started last PM. Would not provide area of pain/type of pain/amount of pain/ or any other symptoms. Please contact pt to discuss further, thank you.

## 2023-04-21 NOTE — TELEPHONE ENCOUNTER
Patient should be evaluated may need some imaging test.  I would like patient to go to urgent care for evaluation today now. Thanks.

## 2023-06-09 ENCOUNTER — PATIENT MESSAGE (OUTPATIENT)
Dept: FAMILY MEDICINE CLINIC | Facility: CLINIC | Age: 83
End: 2023-06-09

## 2023-06-09 NOTE — TELEPHONE ENCOUNTER
From: Hernandez Martinez  To: Darien Domínguez MD  Sent: 6/9/2023 9:22 AM CDT  Subject: next visit    should I have bloodwork done ahead of the visit?   Max

## 2023-06-14 ENCOUNTER — LAB ENCOUNTER (OUTPATIENT)
Dept: LAB | Age: 83
End: 2023-06-14
Attending: FAMILY MEDICINE
Payer: MEDICARE

## 2023-06-14 DIAGNOSIS — E11.9 DIABETES MELLITUS TYPE 2 WITHOUT RETINOPATHY (HCC): ICD-10-CM

## 2023-06-14 DIAGNOSIS — R31.21 ASYMPTOMATIC MICROSCOPIC HEMATURIA: ICD-10-CM

## 2023-06-14 DIAGNOSIS — E78.5 HYPERLIPIDEMIA, UNSPECIFIED HYPERLIPIDEMIA TYPE: ICD-10-CM

## 2023-06-14 LAB
ALBUMIN SERPL-MCNC: 3.7 G/DL (ref 3.4–5)
ALBUMIN/GLOB SERPL: 1.1 {RATIO} (ref 1–2)
ALP LIVER SERPL-CCNC: 49 U/L
ALT SERPL-CCNC: 29 U/L
ANION GAP SERPL CALC-SCNC: 9 MMOL/L (ref 0–18)
AST SERPL-CCNC: 18 U/L (ref 15–37)
BASOPHILS # BLD AUTO: 0.04 X10(3) UL (ref 0–0.2)
BASOPHILS NFR BLD AUTO: 0.5 %
BILIRUB SERPL-MCNC: 0.8 MG/DL (ref 0.1–2)
BILIRUB UR QL STRIP.AUTO: NEGATIVE
BUN BLD-MCNC: 20 MG/DL (ref 7–18)
CALCIUM BLD-MCNC: 9.2 MG/DL (ref 8.5–10.1)
CHLORIDE SERPL-SCNC: 105 MMOL/L (ref 98–112)
CHOLEST SERPL-MCNC: 134 MG/DL (ref ?–200)
CLARITY UR REFRACT.AUTO: CLEAR
CO2 SERPL-SCNC: 21 MMOL/L (ref 21–32)
COLOR UR AUTO: YELLOW
CREAT BLD-MCNC: 0.94 MG/DL
CREAT UR-SCNC: 104 MG/DL
EOSINOPHIL # BLD AUTO: 0.21 X10(3) UL (ref 0–0.7)
EOSINOPHIL NFR BLD AUTO: 2.8 %
ERYTHROCYTE [DISTWIDTH] IN BLOOD BY AUTOMATED COUNT: 12.4 %
EST. AVERAGE GLUCOSE BLD GHB EST-MCNC: 151 MG/DL (ref 68–126)
FASTING PATIENT LIPID ANSWER: YES
FASTING STATUS PATIENT QL REPORTED: YES
GFR SERPLBLD BASED ON 1.73 SQ M-ARVRAT: 81 ML/MIN/1.73M2 (ref 60–?)
GLOBULIN PLAS-MCNC: 3.5 G/DL (ref 2.8–4.4)
GLUCOSE BLD-MCNC: 147 MG/DL (ref 70–99)
GLUCOSE UR STRIP.AUTO-MCNC: NEGATIVE MG/DL
HBA1C MFR BLD: 6.9 % (ref ?–5.7)
HCT VFR BLD AUTO: 48.2 %
HDLC SERPL-MCNC: 46 MG/DL (ref 40–59)
HGB BLD-MCNC: 16.2 G/DL
IMM GRANULOCYTES # BLD AUTO: 0.02 X10(3) UL (ref 0–1)
IMM GRANULOCYTES NFR BLD: 0.3 %
KETONES UR STRIP.AUTO-MCNC: NEGATIVE MG/DL
LDLC SERPL CALC-MCNC: 69 MG/DL (ref ?–100)
LYMPHOCYTES # BLD AUTO: 1.89 X10(3) UL (ref 1–4)
LYMPHOCYTES NFR BLD AUTO: 25.1 %
MCH RBC QN AUTO: 31.3 PG (ref 26–34)
MCHC RBC AUTO-ENTMCNC: 33.6 G/DL (ref 31–37)
MCV RBC AUTO: 93.2 FL
MICROALBUMIN UR-MCNC: 3.33 MG/DL
MICROALBUMIN/CREAT 24H UR-RTO: 32 UG/MG (ref ?–30)
MONOCYTES # BLD AUTO: 0.56 X10(3) UL (ref 0.1–1)
MONOCYTES NFR BLD AUTO: 7.4 %
NEUTROPHILS # BLD AUTO: 4.81 X10 (3) UL (ref 1.5–7.7)
NEUTROPHILS # BLD AUTO: 4.81 X10(3) UL (ref 1.5–7.7)
NEUTROPHILS NFR BLD AUTO: 63.9 %
NITRITE UR QL STRIP.AUTO: NEGATIVE
NONHDLC SERPL-MCNC: 88 MG/DL (ref ?–130)
OSMOLALITY SERPL CALC.SUM OF ELEC: 285 MOSM/KG (ref 275–295)
PH UR STRIP.AUTO: 6 [PH] (ref 5–8)
PLATELET # BLD AUTO: 252 10(3)UL (ref 150–450)
POTASSIUM SERPL-SCNC: 4.1 MMOL/L (ref 3.5–5.1)
PROT SERPL-MCNC: 7.2 G/DL (ref 6.4–8.2)
PROT UR STRIP.AUTO-MCNC: NEGATIVE MG/DL
RBC # BLD AUTO: 5.17 X10(6)UL
RBC UR QL AUTO: NEGATIVE
SODIUM SERPL-SCNC: 135 MMOL/L (ref 136–145)
SP GR UR STRIP.AUTO: 1.01 (ref 1–1.03)
TRIGL SERPL-MCNC: 105 MG/DL (ref 30–149)
TSI SER-ACNC: 2.59 MIU/ML (ref 0.36–3.74)
UROBILINOGEN UR STRIP.AUTO-MCNC: <2 MG/DL
VLDLC SERPL CALC-MCNC: 16 MG/DL (ref 0–30)
WBC # BLD AUTO: 7.5 X10(3) UL (ref 4–11)

## 2023-06-14 PROCEDURE — 87086 URINE CULTURE/COLONY COUNT: CPT

## 2023-06-14 PROCEDURE — 84443 ASSAY THYROID STIM HORMONE: CPT

## 2023-06-14 PROCEDURE — 81001 URINALYSIS AUTO W/SCOPE: CPT

## 2023-06-14 PROCEDURE — 82570 ASSAY OF URINE CREATININE: CPT

## 2023-06-14 PROCEDURE — 82043 UR ALBUMIN QUANTITATIVE: CPT

## 2023-06-14 PROCEDURE — 80053 COMPREHEN METABOLIC PANEL: CPT

## 2023-06-14 PROCEDURE — 83036 HEMOGLOBIN GLYCOSYLATED A1C: CPT

## 2023-06-14 PROCEDURE — 80061 LIPID PANEL: CPT

## 2023-06-14 PROCEDURE — 85025 COMPLETE CBC W/AUTO DIFF WBC: CPT

## 2023-06-16 ENCOUNTER — OFFICE VISIT (OUTPATIENT)
Dept: FAMILY MEDICINE CLINIC | Facility: CLINIC | Age: 83
End: 2023-06-16
Payer: MEDICARE

## 2023-06-16 VITALS
TEMPERATURE: 98 F | DIASTOLIC BLOOD PRESSURE: 64 MMHG | SYSTOLIC BLOOD PRESSURE: 132 MMHG | BODY MASS INDEX: 29.68 KG/M2 | HEART RATE: 78 BPM | WEIGHT: 212 LBS | RESPIRATION RATE: 18 BRPM | HEIGHT: 71 IN

## 2023-06-16 DIAGNOSIS — I65.23 BILATERAL CAROTID ARTERY STENOSIS: ICD-10-CM

## 2023-06-16 DIAGNOSIS — I25.10 CORONARY ARTERY CALCIFICATION SEEN ON CT SCAN: ICD-10-CM

## 2023-06-16 DIAGNOSIS — I25.10 ATHEROSCLEROSIS OF NATIVE CORONARY ARTERY OF NATIVE HEART WITHOUT ANGINA PECTORIS: ICD-10-CM

## 2023-06-16 DIAGNOSIS — R97.20 ELEVATED PSA MEASUREMENT: ICD-10-CM

## 2023-06-16 DIAGNOSIS — K76.0 FATTY LIVER: ICD-10-CM

## 2023-06-16 DIAGNOSIS — J45.20 MILD INTERMITTENT ASTHMA, UNSPECIFIED WHETHER COMPLICATED: ICD-10-CM

## 2023-06-16 DIAGNOSIS — I70.0 ATHEROSCLEROSIS OF ABDOMINAL AORTA (HCC): ICD-10-CM

## 2023-06-16 DIAGNOSIS — E11.59 TYPE 2 DIABETES MELLITUS WITH OTHER CIRCULATORY COMPLICATIONS (HCC): ICD-10-CM

## 2023-06-16 DIAGNOSIS — E78.5 HYPERLIPIDEMIA, UNSPECIFIED HYPERLIPIDEMIA TYPE: ICD-10-CM

## 2023-06-16 DIAGNOSIS — Z00.00 ENCOUNTER FOR ANNUAL HEALTH EXAMINATION: Primary | ICD-10-CM

## 2023-06-16 DIAGNOSIS — J44.9 ASTHMA WITH COPD (CHRONIC OBSTRUCTIVE PULMONARY DISEASE) (HCC): ICD-10-CM

## 2023-06-16 DIAGNOSIS — E11.9 DIABETES MELLITUS TYPE 2 WITHOUT RETINOPATHY (HCC): ICD-10-CM

## 2023-06-16 NOTE — PATIENT INSTRUCTIONS
Tdap tetanus shot. Continue current meds. Watch diet for fats and carbs. Stay active. Schedule medication check for January. Do fasting blood work prior to that visit. Chuy Dietz's SCREENING SCHEDULE   Tests on this list are recommended by your physician but may not be covered, or covered at this frequency, by your insurer. Please check with your insurance carrier before scheduling to verify coverage.    PREVENTATIVE SERVICES FREQUENCY &  COVERAGE DETAILS LAST COMPLETION DATE   Diabetes Screening    Fasting Blood Sugar / Glucose    One screening every 12 months if never tested or if previously tested but not diagnosed with pre-diabetes   One screening every 6 months if diagnosed with pre-diabetes Lab Results   Component Value Date     (H) 06/14/2023        Cardiovascular Disease Screening    Lipid Panel  Cholesterol  Lipoprotein (HDL)  Triglycerides Covered every 5 years for all Medicare beneficiaries without apparent signs or symptoms of cardiovascular disease Lab Results   Component Value Date    CHOLEST 134 06/14/2023    HDL 46 06/14/2023    LDL 69 06/14/2023    TRIG 105 06/14/2023         Electrocardiogram (EKG)   Covered if needed at Welcome to Medicare, and non-screening if indicated for medical reasons 04/29/2021      Ultrasound Screening for Abdominal Aortic Aneurysm (AAA) Covered once in a lifetime for one of the following risk factors    Men who are 73-68 years old and have ever smoked    Anyone with a family history -     Colorectal Cancer Screening  Covered for ages 52-80; only need ONE of the following:    Colonoscopy   Covered every 10 years    Covered every 2 years if patient is at high risk or previous colonoscopy was abnormal 08/22/2022    Colorectal Cancer Screening due on 08/22/2027    Flexible Sigmoidoscopy   Covered every 4 years -    Fecal Occult Blood Test Covered annually -   Prostate Cancer Screening    Prostate-Specific Antigen (PSA) Annually Lab Results Component Value Date    PSA 3.46 06/18/2018     There are no preventive care reminders to display for this patient. Immunizations    Influenza Covered once per flu season  Please get every year 09/23/2022  No recommendations at this time    Pneumococcal Each vaccine ([id number] & [id number]) covered once after 65 Prevnar 13: 01/13/2016    [id number]: 06/08/2018     No recommendations at this time    Hepatitis B One screening covered for patients with certain risk factors   -  No recommendations at this time    Tetanus Toxoid Not covered by Medicare Part B unless medically necessary (cut with metal); may be covered with your pharmacy prescription benefits -    Tetanus, Diptheria and Pertusis TD and TDaP Not covered by Medicare Part B -  No recommendations at this time    Zoster Not covered by Medicare Part B; may be covered with your pharmacy  prescription benefits -  No recommendations at this time       Diabetes      Hemoglobin A1C Annually; if last result is elevated, may be asked to retest more frequently.     Medicare covers every 3 months Lab Results   Component Value Date     (H) 06/14/2023    A1C 6.9 (H) 06/14/2023       No recommendations at this time    Creat/alb ratio Annually Lab Results   Component Value Date    MICROALBCREA 32.0 (H) 06/14/2023       LDL Annually Lab Results   Component Value Date    LDL 69 06/14/2023       Dilated Eye Exam Annually 11/11/2022       Chronic Obstructive Pulmonary Disease (COPD)    Spirometry Annually Spirometry date: 01/25/2016

## 2023-08-29 ENCOUNTER — PATIENT MESSAGE (OUTPATIENT)
Dept: FAMILY MEDICINE CLINIC | Facility: CLINIC | Age: 83
End: 2023-08-29

## 2023-11-16 DIAGNOSIS — E11.9 DIABETES MELLITUS TYPE 2 WITHOUT RETINOPATHY (HCC): ICD-10-CM

## 2023-11-21 ENCOUNTER — MED REC SCAN ONLY (OUTPATIENT)
Dept: FAMILY MEDICINE CLINIC | Facility: CLINIC | Age: 83
End: 2023-11-21

## 2024-01-01 ENCOUNTER — PATIENT MESSAGE (OUTPATIENT)
Dept: FAMILY MEDICINE CLINIC | Facility: CLINIC | Age: 84
End: 2024-01-01

## 2024-01-02 NOTE — TELEPHONE ENCOUNTER
From: Chuy Dietz  To: Corinne Maria  Sent: 1/1/2024 10:51 AM CST  Subject: Next visit    I have a visit to the lab before my next appointment in my calendar (Gopi. 3) but not the visit with Dr. Maria. It is also not shown in MyChart. Pls tell me.  Max

## 2024-01-15 NOTE — PATIENT INSTRUCTIONS
Call [phone] to schedule ultrasound of the carotids. Continue current meds. Watch diet for fats and carbs. Stay active. Viot D 3 1000 units daily. Vit B complex over the counter. Magnesium 200 mg daily over the counter.    Schedule super visi Mother/Father

## 2024-01-22 ENCOUNTER — LAB ENCOUNTER (OUTPATIENT)
Dept: LAB | Age: 84
End: 2024-01-22
Attending: FAMILY MEDICINE
Payer: MEDICARE

## 2024-01-22 DIAGNOSIS — E78.5 HYPERLIPIDEMIA, UNSPECIFIED HYPERLIPIDEMIA TYPE: ICD-10-CM

## 2024-01-22 DIAGNOSIS — E11.9 DIABETES MELLITUS TYPE 2 WITHOUT RETINOPATHY (HCC): ICD-10-CM

## 2024-01-22 LAB
ALBUMIN SERPL-MCNC: 3.8 G/DL (ref 3.4–5)
ALBUMIN/GLOB SERPL: 1.2 {RATIO} (ref 1–2)
ALP LIVER SERPL-CCNC: 44 U/L
ANION GAP SERPL CALC-SCNC: 3 MMOL/L (ref 0–18)
AST SERPL-CCNC: 10 U/L (ref 15–37)
BILIRUB SERPL-MCNC: 0.7 MG/DL (ref 0.1–2)
BUN BLD-MCNC: 17 MG/DL (ref 9–23)
CALCIUM BLD-MCNC: 9.2 MG/DL (ref 8.5–10.1)
CHLORIDE SERPL-SCNC: 108 MMOL/L (ref 98–112)
CHOLEST SERPL-MCNC: 134 MG/DL (ref ?–200)
CO2 SERPL-SCNC: 29 MMOL/L (ref 21–32)
CREAT BLD-MCNC: 0.94 MG/DL
CREAT UR-SCNC: 146 MG/DL
EGFRCR SERPLBLD CKD-EPI 2021: 81 ML/MIN/1.73M2 (ref 60–?)
EST. AVERAGE GLUCOSE BLD GHB EST-MCNC: 151 MG/DL (ref 68–126)
FASTING PATIENT LIPID ANSWER: YES
FASTING STATUS PATIENT QL REPORTED: YES
GLOBULIN PLAS-MCNC: 3.2 G/DL (ref 2.8–4.4)
GLUCOSE BLD-MCNC: 135 MG/DL (ref 70–99)
HBA1C MFR BLD: 6.9 % (ref ?–5.7)
HDLC SERPL-MCNC: 46 MG/DL (ref 40–59)
LDLC SERPL CALC-MCNC: 70 MG/DL (ref ?–100)
MICROALBUMIN UR-MCNC: 4.53 MG/DL
MICROALBUMIN/CREAT 24H UR-RTO: 31 UG/MG (ref ?–30)
NONHDLC SERPL-MCNC: 88 MG/DL (ref ?–130)
OSMOLALITY SERPL CALC.SUM OF ELEC: 294 MOSM/KG (ref 275–295)
POTASSIUM SERPL-SCNC: 4.4 MMOL/L (ref 3.5–5.1)
PROT SERPL-MCNC: 7 G/DL (ref 6.4–8.2)
SODIUM SERPL-SCNC: 140 MMOL/L (ref 136–145)
TRIGL SERPL-MCNC: 99 MG/DL (ref 30–149)
VLDLC SERPL CALC-MCNC: 15 MG/DL (ref 0–30)

## 2024-01-22 PROCEDURE — 83036 HEMOGLOBIN GLYCOSYLATED A1C: CPT

## 2024-01-22 PROCEDURE — 80061 LIPID PANEL: CPT

## 2024-01-22 PROCEDURE — 80053 COMPREHEN METABOLIC PANEL: CPT

## 2024-01-22 PROCEDURE — 82570 ASSAY OF URINE CREATININE: CPT

## 2024-01-22 PROCEDURE — 82043 UR ALBUMIN QUANTITATIVE: CPT

## 2024-01-23 ENCOUNTER — TELEPHONE (OUTPATIENT)
Dept: ORTHOPEDICS CLINIC | Facility: CLINIC | Age: 84
End: 2024-01-23

## 2024-01-23 DIAGNOSIS — M25.562 LEFT KNEE PAIN, UNSPECIFIED CHRONICITY: ICD-10-CM

## 2024-01-23 DIAGNOSIS — Z01.89 ENCOUNTER FOR LOWER EXTREMITY COMPARISON IMAGING STUDY: Primary | ICD-10-CM

## 2024-01-24 ENCOUNTER — OFFICE VISIT (OUTPATIENT)
Dept: FAMILY MEDICINE CLINIC | Facility: CLINIC | Age: 84
End: 2024-01-24
Payer: MEDICARE

## 2024-01-24 ENCOUNTER — HOSPITAL ENCOUNTER (OUTPATIENT)
Dept: GENERAL RADIOLOGY | Age: 84
Discharge: HOME | End: 2024-01-24
Attending: PHYSICIAN ASSISTANT
Payer: MEDICARE

## 2024-01-24 ENCOUNTER — OFFICE VISIT (OUTPATIENT)
Dept: ORTHOPEDICS CLINIC | Facility: CLINIC | Age: 84
End: 2024-01-24
Payer: MEDICARE

## 2024-01-24 VITALS
BODY MASS INDEX: 28.58 KG/M2 | HEART RATE: 76 BPM | HEIGHT: 72 IN | TEMPERATURE: 98 F | DIASTOLIC BLOOD PRESSURE: 70 MMHG | SYSTOLIC BLOOD PRESSURE: 139 MMHG | WEIGHT: 211 LBS | RESPIRATION RATE: 16 BRPM

## 2024-01-24 VITALS — HEIGHT: 72 IN | WEIGHT: 212 LBS | BODY MASS INDEX: 28.71 KG/M2

## 2024-01-24 DIAGNOSIS — R97.20 ELEVATED PSA MEASUREMENT: ICD-10-CM

## 2024-01-24 DIAGNOSIS — Z12.5 SCREENING FOR PROSTATE CANCER: ICD-10-CM

## 2024-01-24 DIAGNOSIS — M17.12 PRIMARY OSTEOARTHRITIS OF LEFT KNEE: ICD-10-CM

## 2024-01-24 DIAGNOSIS — J44.89 ASTHMA WITH COPD (CHRONIC OBSTRUCTIVE PULMONARY DISEASE): Chronic | ICD-10-CM

## 2024-01-24 DIAGNOSIS — E11.59 TYPE 2 DIABETES MELLITUS WITH OTHER CIRCULATORY COMPLICATIONS (HCC): ICD-10-CM

## 2024-01-24 DIAGNOSIS — M25.562 LEFT KNEE PAIN, UNSPECIFIED CHRONICITY: ICD-10-CM

## 2024-01-24 DIAGNOSIS — E11.9 DIABETES MELLITUS TYPE 2 WITHOUT RETINOPATHY (HCC): ICD-10-CM

## 2024-01-24 DIAGNOSIS — I70.0 ATHEROSCLEROSIS OF ABDOMINAL AORTA (HCC): ICD-10-CM

## 2024-01-24 DIAGNOSIS — K76.0 FATTY LIVER: ICD-10-CM

## 2024-01-24 DIAGNOSIS — I25.10 ATHEROSCLEROSIS OF NATIVE CORONARY ARTERY OF NATIVE HEART WITHOUT ANGINA PECTORIS: ICD-10-CM

## 2024-01-24 DIAGNOSIS — Z00.00 ENCOUNTER FOR ANNUAL HEALTH EXAMINATION: Primary | ICD-10-CM

## 2024-01-24 DIAGNOSIS — E78.5 HYPERLIPIDEMIA, UNSPECIFIED HYPERLIPIDEMIA TYPE: ICD-10-CM

## 2024-01-24 DIAGNOSIS — Z23 NEED FOR PNEUMOCOCCAL VACCINATION: ICD-10-CM

## 2024-01-24 DIAGNOSIS — I65.23 BILATERAL CAROTID ARTERY STENOSIS: ICD-10-CM

## 2024-01-24 DIAGNOSIS — Z01.89 ENCOUNTER FOR LOWER EXTREMITY COMPARISON IMAGING STUDY: ICD-10-CM

## 2024-01-24 DIAGNOSIS — I25.10 CORONARY ARTERY CALCIFICATION SEEN ON CT SCAN: ICD-10-CM

## 2024-01-24 DIAGNOSIS — J45.20 MILD INTERMITTENT ASTHMA, UNSPECIFIED WHETHER COMPLICATED: ICD-10-CM

## 2024-01-24 DIAGNOSIS — S80.02XA CONTUSION OF LEFT KNEE, INITIAL ENCOUNTER: Primary | ICD-10-CM

## 2024-01-24 PROCEDURE — 3078F DIAST BP <80 MM HG: CPT | Performed by: FAMILY MEDICINE

## 2024-01-24 PROCEDURE — 99214 OFFICE O/P EST MOD 30 MIN: CPT | Performed by: FAMILY MEDICINE

## 2024-01-24 PROCEDURE — 1159F MED LIST DOCD IN RCRD: CPT | Performed by: PHYSICIAN ASSISTANT

## 2024-01-24 PROCEDURE — G0439 PPPS, SUBSEQ VISIT: HCPCS | Performed by: FAMILY MEDICINE

## 2024-01-24 PROCEDURE — 1160F RVW MEDS BY RX/DR IN RCRD: CPT | Performed by: PHYSICIAN ASSISTANT

## 2024-01-24 PROCEDURE — 73562 X-RAY EXAM OF KNEE 3: CPT | Performed by: PHYSICIAN ASSISTANT

## 2024-01-24 PROCEDURE — 3075F SYST BP GE 130 - 139MM HG: CPT | Performed by: FAMILY MEDICINE

## 2024-01-24 PROCEDURE — 3008F BODY MASS INDEX DOCD: CPT | Performed by: PHYSICIAN ASSISTANT

## 2024-01-24 PROCEDURE — 1170F FXNL STATUS ASSESSED: CPT | Performed by: FAMILY MEDICINE

## 2024-01-24 PROCEDURE — G0009 ADMIN PNEUMOCOCCAL VACCINE: HCPCS | Performed by: FAMILY MEDICINE

## 2024-01-24 PROCEDURE — 96160 PT-FOCUSED HLTH RISK ASSMT: CPT | Performed by: FAMILY MEDICINE

## 2024-01-24 PROCEDURE — 90677 PCV20 VACCINE IM: CPT | Performed by: FAMILY MEDICINE

## 2024-01-24 PROCEDURE — 3008F BODY MASS INDEX DOCD: CPT | Performed by: FAMILY MEDICINE

## 2024-01-24 PROCEDURE — 1159F MED LIST DOCD IN RCRD: CPT | Performed by: FAMILY MEDICINE

## 2024-01-24 PROCEDURE — 1126F AMNT PAIN NOTED NONE PRSNT: CPT | Performed by: FAMILY MEDICINE

## 2024-01-24 PROCEDURE — 73564 X-RAY EXAM KNEE 4 OR MORE: CPT | Performed by: PHYSICIAN ASSISTANT

## 2024-01-24 PROCEDURE — 1160F RVW MEDS BY RX/DR IN RCRD: CPT | Performed by: FAMILY MEDICINE

## 2024-01-24 PROCEDURE — 1125F AMNT PAIN NOTED PAIN PRSNT: CPT | Performed by: PHYSICIAN ASSISTANT

## 2024-01-24 PROCEDURE — 99204 OFFICE O/P NEW MOD 45 MIN: CPT | Performed by: PHYSICIAN ASSISTANT

## 2024-01-24 RX ORDER — ROSUVASTATIN CALCIUM 5 MG/1
5 TABLET, COATED ORAL
Qty: 45 TABLET | Refills: 0 | Status: SHIPPED | OUTPATIENT
Start: 2024-01-24

## 2024-01-24 NOTE — H&P
[facility] - ORTHOPEDICS  [address] [place] [id number]  [phone]     NEW PATIENT VISIT - HISTORY AND PHYSICAL EXAMINATION     Name: Chuy Dietz   MRN: [id number]  Date: 1/24/2024     CC: Left knee pain.     REFERRED BY: Corinne Maria MD    HPI:   Chuy Dietz is a very pleasant 83 year old male who presents today for evaluation, consultation, and management of left knee pain after a fall on ice on January 22, 2024.  He has has had persistent swelling, and feelings of instability.  He thinks he may have twisted his knee.  He has a history of a right knee arthroplasty performed in 2015.  He rates his pain to be a 2 out of 10 at baseline, and notes 0% normal function.  He is retired. He complains of locking and instability.       PMH:   Past Medical History:   Diagnosis Date    Asthma     Calculus of kidney     Diabetes (HCC)     Disorder of liver     fatty liver per problem list    High cholesterol     Mild intermittent asthma 4/3/2019    Mild intermittent asthma without complication 9/11/2017    Obesity (BMI 30.0-34.9) 9/11/2017    Short of breath on exertion 1/13/2016       PAST SURGICAL HX:  Past Surgical History:   Procedure Laterality Date    APPENDECTOMY  1961    COLONOSCOPY N/A 7/12/2022    Procedure: COLONOSCOPY;  Surgeon: Castro Lorenzana MD;  Location:  ENDOSCOPY    COLONOSCOPY N/A 8/22/2022    Procedure: COLONOSCOPY with cold snare polypectomy;  Surgeon: Castro Lorenzana MD;  Location:  ENDOSCOPY    COLONOSCOPY,BIOPSY N/A 5/14/2016    Procedure: COLONOSCOPY, POSSIBLE BIOPSY, POSSIBLE POLYPECTOMY 43728;  Surgeon: Castro Lorenzana MD;  Location: [facility]    COLONOSCOPY,REMV LESN,SNARE N/A 5/14/2016    Procedure: COLONOSCOPY, POSSIBLE BIOPSY, POSSIBLE POLYPECTOMY 76386;  Surgeon: Castro Lorenzana MD;  Location: [facility]    KNEE REPLACEMENT SURGERY  2/1/2013    Right TKR by Dr. Camp    OTHER SURGICAL HISTORY  1986    right  knee surgery/lig repair    OTHER SURGICAL HISTORY  03/24/2021    Cystoscopy/Stent removal-    REMV CATARACT EXTRACAP,INSERT LENS Left 12/14/2015    Procedure: LEFT PHACOEMULSIFICATION OF CATARACT WITH INTRAOCULAR LENS IMPLANT [id number];  Surgeon: Oziel Rivas MD;  Location: [facility]    REMV CATARACT EXTRACAP,INSERT LENS Right 1/4/2016    Procedure: RIGHT PHACOEMULSIFICATION OF CATARACT WITH INTRAOCULAR LENS IMPLANT [id number];  Surgeon: Oziel Rivas MD;  Location: [facility]       FAMILY HX:  Family History   Problem Relation Age of Onset    Cancer Father         lung ca, smoker    Other (Other) Mother         thyroid problem, tracheostomy,        ALLERGIES:  Patient has no known allergies.    MEDICATIONS:   Current Outpatient Medications   Medication Sig Dispense Refill    METFORMIN 500 MG Oral Tab Take 1 tablet (500 mg total) by mouth daily with breakfast. 90 tablet 0    rosuvastatin 5 MG Oral Tab Take 1 tablet (5 mg total) by mouth 3 (three) times a week. ON MON, WED, FRI 45 tablet 1    tamsulosin (FLOMAX) cap 1 capsule (0.4 mg total) daily.      aspirin 81 MG Oral Tab Take 1 tablet (81 mg total) by mouth daily.      Multiple Vitamins-Minerals (SUPER HIGH VITAMINS/MINERALS) Oral Tab Take 1 tablet by mouth daily.         ROS: A comprehensive 14 point review of systems was performed and was negative aside from the aforementioned per history of present illness.    SOCIAL HX:  Social History     Occupational History    Not on file   Tobacco Use    Smoking status: Never    Smokeless tobacco: Never   Vaping Use    Vaping Use: Never used   Substance and Sexual Activity    Alcohol use: Yes     Alcohol/week: 0.0 standard drinks of alcohol     Comment: 1 Lopez Lite at dinner    Drug use: No    Sexual activity: Not on file       PE:   Vitals:    01/24/24 0812   Weight: 212 lb (96.2 kg)   Height: 6' (1.829 m)     Estimated body mass index is 28.75 kg/m² as calculated from the  following:    Height as of this encounter: 6' (1.829 m).    Weight as of this encounter: 212 lb (96.2 kg).    Physical Exam  Constitutional:       Appearance: Normal appearance.   HENT:      Head: Normocephalic and atraumatic.   Eyes:      Extraocular Movements: Extraocular movements intact.   Neck:      Musculoskeletal: Normal range of motion and neck supple.   Cardiovascular:      Pulses: Normal pulses.   Pulmonary:      Effort: Pulmonary effort is normal. No respiratory distress.   Abdominal:      General: There is no distension.   Skin:     General: Skin is warm.      Capillary Refill: Capillary refill takes less than 2 seconds.      Findings: No bruising.   Neurological:      General: No focal deficit present.      Mental Status: Alert.   Psychiatric:         Mood and Affect: Mood normal.     Examination of the left knee demonstrates:     Skin is intact, warm and dry.   Atrophy: none    Effusion: moderate    Joint line tenderness: medial  Crepitation: none   Fawn: Positive   Patellar mobility: normal without apprehension  J-sign: none    ROM: Extension full  Flexion 140 degrees  ACL:  guarded   PCL:  Negative Posterior Drawer  Collateral Ligaments: Stable to Varus and Valgus stress at 0 and 30 degrees  Strength: normal   Hip joint: normal pain-free ROM   Gait:  normal   Leg length: equal and symmetric  Alignment:  neutral     No obvious peripheral edema noted.   Distal neurovascular exam demonstrates normal perfusion, intact sensation to light touch and full strength.     Examination of the contralateral knee demonstrates:  No significant atrophy, swelling or effusion. Full range of motion. Neurovascularly intact distally.    Radiographic Examination/Diagnostics:  I personally viewed, independently interpreted and radiology report was reviewed.    X-rays were reviewed from January 24, 2024 of the left knee demonstrating evidence of moderate medial compartment osteoarthritis with no evidence of fracture  foreign body or soft tissue injury.  Lateral compartment well-preserved.  There is evidence of moderate patellofemoral osteoarthritic changes with osteophyte formation laterally.  Right knee arthroplasty well-maintained intact and stable with no evidence of loosening.    IMPRESSION: Chuy Dietz is a 83 year old male who presents with left knee contusion in the setting of osteoarthritis, with findings concerning for acute meniscus tear.     PLAN:   We had a detailed discussion outlining the etiology, anatomy, pathophysiology, and natural history of the patient's findings. Imaging was reviewed in detail and correlated to a 3-dimensional model of the patient's pathology.     We reviewed the treatment of this disease condition. In light of the acute traumatic incident, loss of normal function, and  failure to progress conservatively we recommend an MRI to evaluate the integrity of the patient's medial meniscus. The patient will follow up after imaging.   Differential diagnosis includes but not limited to: cartilage injury/loose body, meniscus tear/injury, ACL tear, bone marrow edema, and osteoarthritis.     External records were also reviewed for pertinent historical findings contributing to the patients undiagnosed new problem with uncertain prognosis.     The patient had the opportunity to ask questions, and all questions were answered appropriately.           FOLLOW-UP:  Return to clinic following completion of MRI to review scan and findings.             Constanza Sykes [facility], PA-C Orthopedic Surgery / Sports Medicine Specialist  [facility]  [address] [place] [address] [id number] [place] [email or web address]  [email or web address]  t: [phone]  o: [phone]  f: [phone]    This note was dictated using Dragon software.  While it was briefly proofread prior to completion, some grammatical, spelling, and word choice errors due to dictation may still occur.

## 2024-01-24 NOTE — PROGRESS NOTES
Subjective:   Chuy Dietz is a 83 year old male who presents for a MA (Medicare Advantage) Supervisit (Once per calendar year) and scheduled follow up of multiple significant but stable problems and Diabetes hypertension hypercholesterolemia asthma elevated PSA discussed results.      Patient is coming for follow-up of diabetes.  A1c 6.9. He is taking metformin.  Tolerates medication well.  Also having been watching diet.  Lost some weight.  Limit some sweets but also having some of those.  We will try to work on his diet.  His eye examination was completed..  Denies  numbness no tingling in the feet.     Coronary artery disease  patient was started on rosuvastatin doing well.  Asymptomatic trying to exercise on a bike 30 minutes every day.      Carotid atherosclerosis monitor ultrasound periodically.  Check ultrasound for stability.  Monitor.  .  Aortic atherosclerosis  on cholesterol medication , monitor.    Fatty liver on imaging test monitor stable.     Asthma/COPD patient stopped using inhalers he said he has not had any shortness of breath no wheezing.  Feeling okay.  His symptoms are coming on and off which is typical for him.  We will continue monitoring.      Elevated PSA patient seeing urologist had prostate examination by them.  Monitor [place] numbers order was placed for the blood work to do before next visit.    Patient fallen from the steps on Monday night. Injured his left knee.  He saw orthopedic doctor today recommended to do MRI.  History/Other:   Fall Risk Assessment:   He has been screened for Falls and is High Risk. Fall Prevention information provided to patient in After Visit Summary.    Do you feel unsteady when standing or walking?: No  Do you worry about falling?: No  Have you fallen in the past year?: Yes  How many times have you fallen?: 1  Were you injured?: Yes (Left knee)     Cognitive Assessment:   He had a completely normal cognitive assessment - see flowsheet entries      Functional Ability/Status:   Chuy Dietz has some abnormal functions as listed below:  He has difficulties Affording Meds based on screening of functional status.         Depression Screening (PHQ-2/PHQ-9): PHQ-2 SCORE: 0  , done 1/24/2024       Advanced Directives:   He does have a Living Will but we do NOT have it on file in Epic.    He does have a POA but we do NOT have it on file in Epic.    Discussed Advance Care Planning with patient (and family/surrogate if present). Standard forms made available to patient in After Visit Summary.      Patient Active Problem List   Diagnosis   • Osteoarthrosis, localized, primary, knee   • S/P TKR (total knee replacement)   • Coronary atherosclerosis   • History of adenomatous polyp of colon   • Type 2 diabetes mellitus with other circulatory complications (HCC)   • Diabetes mellitus type 2 without retinopathy (HCC)   • Choroidal nevus of right eye   • Pseudophakia   • Fatty liver   • Atherosclerosis of abdominal aorta (HCC)   • Left nephrolithiasis   • Shortness of breath   • Sleep difficulties   • Asthma with COPD (chronic obstructive pulmonary disease)   • Coronary artery calcification seen on CT scan   • Bilateral carotid artery stenosis     Allergies:  He has No Known Allergies.    Current Medications:  Outpatient Medications Marked as Taking for the 1/24/24 encounter (Office Visit) with Corinne Maria MD   Medication Sig   • rosuvastatin 5 MG Oral Tab Take 1 tablet (5 mg total) by mouth 3 (three) times a week. ON MON, WED, FRI   • metFORMIN 500 MG Oral Tab Take 1 tablet (500 mg total) by mouth daily with breakfast.   • tamsulosin (FLOMAX) cap 1 capsule (0.4 mg total) daily.   • aspirin 81 MG Oral Tab Take 1 tablet (81 mg total) by mouth daily.   • Multiple Vitamins-Minerals (SUPER HIGH VITAMINS/MINERALS) Oral Tab Take 1 tablet by mouth daily.       Medical History:  He  has a past medical history of Asthma, Calculus of kidney, Diabetes (HCC), Disorder of  liver, High cholesterol, Mild intermittent asthma (4/3/2019), Mild intermittent asthma without complication (9/11/2017), Obesity (BMI 30.0-34.9) (9/11/2017), and Short of breath on exertion (1/13/2016).  Surgical History:  He  has a past surgical history that includes appendectomy (1961); knee replacement surgery (2/1/2013); remv cataract extracap,insert lens (Left, 12/14/2015); remv cataract extracap,insert lens (Right, 1/4/2016); colonoscopy,remv lesn,snare (N/A, 5/14/2016); colonoscopy,biopsy (N/A, 5/14/2016); other surgical history (1986); other surgical history (03/24/2021); colonoscopy (N/A, 7/12/2022); and colonoscopy (N/A, 8/22/2022).   Family History:  His family history includes Cancer in his father; Other in his mother.  Social History:  He  reports that he has never smoked. He has never used smokeless tobacco. He reports current alcohol use. He reports that he does not use drugs.    Tobacco:  He has never smoked tobacco.    CAGE Alcohol Screen:   CAGE screening score of 0 on 1/24/2024, showing low risk of alcohol abuse.      Patient Care Team:  Corinne Maria MD as PCP - General (Family Medicine)    Review of Systems  GENERAL: feels well otherwise  SKIN: denies any unusual skin lesions  EYES: denies blurred vision or double vision  HEENT: denies nasal congestion, sinus pain or ST  LUNGS: denies shortness of breath with exertion  CARDIOVASCULAR: denies chest pain on exertion  GI: denies abdominal pain, denies heartburn  : 1 per night nocturia, no complaint of urinary incontinence  MUSCULOSKELETAL: denies back pain  left knee pain status post injury.  NEURO: denies headaches  PSYCHE: denies depression or anxiety  HEMATOLOGIC: denies hx of anemia  ENDOCRINE: denies thyroid history  ALL/ASTHMA: denies hx of allergy or asthma    Objective:   Physical Exam  General Appearance:  Alert, cooperative, no distress, appears stated age   Head:  Normocephalic, without obvious abnormality, atraumatic   Eyes:   PERRL, conjunctiva/corneas clear, EOM's intact, both eyes   Ears:  Normal TM's and external ear canals, both ears   Nose: Nares normal, septum midline, mucosa normal, no drainage or sinus tenderness   Throat: Lips, mucosa, and tongue normal; teeth and gums normal   Neck: Supple, symmetrical, trachea midline, no adenopathy, thyroid: not enlarged, symmetric, no tenderness/mass/nodules,    Back:   Symmetric, no curvature, ROM normal, no CVA tenderness   Lungs:   Clear to auscultation bilaterally, respirations unlabored   Chest Wall:  No tenderness or deformity   Heart:  Regular rate and rhythm, S1, S2 normal, no murmur, rub or gallop   Abdomen:   Soft, non-tender, bowel sounds active all four quadrants,  no masses, no organomegaly   Genitalia:  Deferred   Rectal:  Deferred done by urology   Extremities: Extremities normal, atraumatic, no cyanosis or edema   Pulses: 2+ and symmetric   Skin: Skin color, texture, turgor normal, no rashes or lesions   Lymph nodes: Cervical, supraclavicular, and axillary nodes normal   Neurologic: Normal   Bilateral barefoot skin diabetic exam is normal, visualized feet and the appearance is normal except deformities of the feet due to mild osteoarthritis changes.   Bilateral monofilament/sensation of both feet is normal.  Pulsation pedal pulse exam of both lower legs/feet is normal as well.          /70 (BP Location: Right arm, Patient Position: Sitting, Cuff Size: adult)   Pulse 76   Temp 97.7 °F (36.5 °C) (Temporal)   Resp 16   Ht 6' (1.829 m)   Wt 211 lb (95.7 kg)   BMI 28.62 kg/m²  Estimated body mass index is 28.62 kg/m² as calculated from the following:    Height as of this encounter: 6' (1.829 m).    Weight as of this encounter: 211 lb (95.7 kg).    Medicare Hearing Assessment:   Hearing Screening    Time taken: 1/24/2024  3:40 PM  Screening Method: Finger Rub             Visual Acuity:   Right Eye Visual Acuity: Uncorrected Right Eye Chart Acuity: 20/25   Left Eye  Visual Acuity: Uncorrected Left Eye Chart Acuity: 20/40   Both Eyes Visual Acuity: Uncorrected Both Eyes Chart Acuity: 20/25   Able To Tolerate Visual Acuity: Yes      Results for orders placed or performed in visit on 01/22/24   Hemoglobin A1C   Result Value Ref Range    HgbA1C 6.9 (H) <5.7 %    Estimated Average Glucose 151 (H) 68 - 126 mg/dL   Comp Metabolic Panel (14)   Result Value Ref Range    Glucose 135 (H) 70 - 99 mg/dL    Sodium 140 136 - 145 mmol/L    Potassium 4.4 3.5 - 5.1 mmol/L    Chloride 108 98 - 112 mmol/L    CO2 29.0 21.0 - 32.0 mmol/L    Anion Gap 3 0 - 18 mmol/L    BUN 17 9 - 23 mg/dL    Creatinine 0.94 0.70 - 1.30 mg/dL    Calcium, Total 9.2 8.5 - 10.1 mg/dL    Calculated Osmolality 294 275 - 295 mOsm/kg    eGFR-Cr 81 >=60 mL/min/1.73m2    AST 10 (L) 15 - 37 U/L    ALT      Alkaline Phosphatase 44 (L) 45 - 117 U/L    Bilirubin, Total 0.7 0.1 - 2.0 mg/dL    Total Protein 7.0 6.4 - 8.2 g/dL    Albumin 3.8 3.4 - 5.0 g/dL    Globulin  3.2 2.8 - 4.4 g/dL    A/G Ratio 1.2 1.0 - 2.0    Patient Fasting for CMP? Yes    Lipid Panel   Result Value Ref Range    Cholesterol, Total 134 <200 mg/dL    HDL Cholesterol 46 40 - 59 mg/dL    Triglycerides 99 30 - 149 mg/dL    LDL Cholesterol 70 <100 mg/dL    VLDL 15 0 - 30 mg/dL    Non HDL Chol 88 <130 mg/dL    Patient Fasting for Lipid? Yes    Microalb/Creat Ratio, Random Urine   Result Value Ref Range    Microalbumin, Urine 4.53 mg/dL    Creatinine Ur Random 146.00 mg/dL    Malb/Cre Calc 31.0 (H) <=30.0 ug/mg       Assessment & Plan:   Chuy Dietz is a 83 year old male who presents for a Medicare Assessment.     1. Encounter for annual health examination (Primary)  2. Hyperlipidemia, unspecified hyperlipidemia type  -     Comp Metabolic Panel (14); Future; Expected date: 05/03/2024  -     Lipid Panel; Future; Expected date: 05/03/2024  -     Urinalysis with Culture Reflex; Future; Expected date: 05/03/2024  -     Rosuvastatin Calcium; Take 1 tablet (5 mg  total) by mouth 3 (three) times a week. ON MON, WED, FRI  Dispense: 45 tablet; Refill: 0  3. Diabetes mellitus type 2 without retinopathy (HCC)  -     CBC With Differential With Platelet; Future; Expected date: 05/03/2024  -     Hemoglobin A1C; Future; Expected date: 05/03/2024  -     Microalb/Creat Ratio, Random Urine; Future; Expected date: 05/03/2024  -     TSH W Reflex To Free T4; Future; Expected date: 05/03/2024  -     metFORMIN HCl; Take 1 tablet (500 mg total) by mouth daily with breakfast.  Dispense: 90 tablet; Refill: 1  4. Asthma with COPD (chronic obstructive pulmonary disease)  5. Bilateral carotid artery stenosis  -     Cancel: US CAROTID DOPPLER BILAT - DIAG IMG (CPT=93880); Future; Expected date: 05/03/2024  -     US CAROTID DOPPLER BILAT - DIAG IMG (CPT=93880); Future; Expected date: 01/25/2024  6. Screening for prostate cancer  -     PSA Total, Screen; Future; Expected date: 05/03/2024  7. Atherosclerosis of abdominal aorta (HCC)  8. Elevated PSA measurement  9. Coronary artery calcification seen on CT scan  10. Mild intermittent asthma, unspecified whether complicated  11. Atherosclerosis of native coronary artery of native heart without angina pectoris  Overview:  1/27/17 UFCT calcium score 1334.3   2/7/17 nuclear stress test normal.  Dr Caban   12. Type 2 diabetes mellitus with other circulatory complications (HCC)  13. Fatty liver  14. Need for pneumococcal vaccination  -     Prevnar 20 (PCV20) [43518]  Other orders  -     Cancel: Prevnar 20 (PCV20) [86258]; Future; Expected date: 05/03/2024  -     Cancel: Prevnar 20 (PCV20) [28629]; Future; Expected date: 01/24/2024    Diabetes type 2 controlled monitor blood work check blood work before next visit.  Modify diet.    Hypercholesterolemia on medication stable continue present management    Elevated PSA patient seeing specialist monitored by them.    Asthma of COPD stable continue monitoring patient is asymptomatic.    Atherosclerosis of  abdominal aorta on cholesterol-lowering medication monitor    Asthma intermittent mild stable continue present management    Coronary artery calcification on the CT scan/atherosclerosis of the coronary arteries stable asymptomatic monitor heart scan periodically.    Fatty liver low-fat diet stay active.    Diabetes type 2  circulatory complication stable continue present management    Bilateral carotid artery stenosis stable continue present management.    Left knee injury MRI standing as ordered by specialist.    Need for pneumonia vaccination Prevnar 20 completed today.      Tdap tetanus shot.  Continue current meds.   Watch diet for fats and carbs.   Stay active.    Schedule medication check for January.  Do fasting blood work prior to that visit.    The patient indicates understanding of these issues and agrees to the plan.  Imaging studies ordered.  Lab work ordered.  Reinforced healthy diet, lifestyle, and exercise.      Return in about 6 months (around 7/24/2024).     Corinne Maria MD, 6/16/2023     Supplementary Documentation:   General Health:  In the past six months, have you lost more than 10 pounds without trying?: 2 - No  Has your appetite been poor?: No  Type of Diet: Balanced  How does the patient maintain a good energy level?: Appropriate Exercise;Daily Walks  How would you describe your daily physical activity?: Light  How would you describe your current health state?: Good  How do you maintain positive mental well-being?: Social Interaction;Puzzles;Visiting Family  On a scale of 0 to 10, with 0 being no pain and 10 being severe pain, what is your pain level?: 0 - (None)  In the past six months, have you experienced urine leakage?: 0-No  At any time do you feel concerned for the safety/well-being of yourself and/or your children, in your home or elsewhere?: No  Have you had any immunizations at another office such as Influenza, Hepatitis B, Tetanus, or Pneumococcal?: No        Chuy Chiang  J Luis's SCREENING SCHEDULE   Tests on this list are recommended by your physician but may not be covered, or covered at this frequency, by your insurer.   Please check with your insurance carrier before scheduling to verify coverage.   PREVENTATIVE SERVICES FREQUENCY &  COVERAGE DETAILS LAST COMPLETION DATE   Diabetes Screening    Fasting Blood Sugar / Glucose    One screening every 12 months if never tested or if previously tested but not diagnosed with pre-diabetes   One screening every 6 months if diagnosed with pre-diabetes Lab Results   Component Value Date     (H) 01/22/2024        Cardiovascular Disease Screening    Lipid Panel  Cholesterol  Lipoprotein (HDL)  Triglycerides Covered every 5 years for all Medicare beneficiaries without apparent signs or symptoms of cardiovascular disease Lab Results   Component Value Date    CHOLEST 134 01/22/2024    HDL 46 01/22/2024    LDL 70 01/22/2024    TRIG 99 01/22/2024         Electrocardiogram (EKG)   Covered if needed at Welcome to Medicare, and non-screening if indicated for medical reasons 04/29/2021      Ultrasound Screening for Abdominal Aortic Aneurysm (AAA) Covered once in a lifetime for one of the following risk factors   • Men who are 65-75 years old and have ever smoked   • Anyone with a family history -     Colorectal Cancer Screening  Covered for ages 50-85; only need ONE of the following:    Colonoscopy   Covered every 10 years    Covered every 2 years if patient is at high risk or previous colonoscopy was abnormal 08/22/2022    Health Maintenance   Topic Date Due   • Colorectal Cancer Screening  08/22/2027       Flexible Sigmoidoscopy   Covered every 4 years -    Fecal Occult Blood Test Covered annually -   Prostate Cancer Screening    Prostate-Specific Antigen (PSA) Annually Lab Results   Component Value Date    PSA 3.46 06/18/2018     There are no preventive care reminders to display for this patient.   Immunizations    Influenza Covered once  per flu season  Please get every year 09/27/2023  No recommendations at this time    Pneumococcal Each vaccine ([id number] & [id number]) covered once after 65 Prevnar 13: 01/13/2016    Htrizoatx02: 06/08/2018     No recommendations at this time    Hepatitis B One screening covered for patients with certain risk factors   -  No recommendations at this time    Tetanus Toxoid Not covered by Medicare Part B unless medically necessary (cut with metal); may be covered with your pharmacy prescription benefits -    Tetanus, Diptheria and Pertusis TD and TDaP Not covered by Medicare Part B -  No recommendations at this time    Zoster Not covered by Medicare Part B; may be covered with your pharmacy  prescription benefits -  No recommendations at this time       Diabetes      Hemoglobin A1C Annually; if last result is elevated, may be asked to retest more frequently.    Medicare covers every 3 months Lab Results   Component Value Date     (H) 01/22/2024    A1C 6.9 (H) 01/22/2024       No recommendations at this time    Creat/alb ratio Annually Lab Results   Component Value Date    MICROALBCREA 31.0 (H) 01/22/2024       LDL Annually Lab Results   Component Value Date    LDL 70 01/22/2024       Dilated Eye Exam Annually Last Diabetic Eye Exam:  Last Dilated Eye Exam: 11/13/23  Eye Exam shows Diabetic Retinopathy?: No

## 2024-01-24 NOTE — PATIENT INSTRUCTIONS
RSV vaccine.   Continue current meds.   Watch diet for fats and carbs.   Stay active.    Call [phone] to schedule US carotids.   Do fasting labs prior visit in July 2024.    Chuy Diezt's SCREENING SCHEDULE   Tests on this list are recommended by your physician but may not be covered, or covered at this frequency, by your insurer.   Please check with your insurance carrier before scheduling to verify coverage.   PREVENTATIVE SERVICES FREQUENCY &  COVERAGE DETAILS LAST COMPLETION DATE   Diabetes Screening    Fasting Blood Sugar / Glucose    One screening every 12 months if never tested or if previously tested but not diagnosed with pre-diabetes   One screening every 6 months if diagnosed with pre-diabetes Lab Results   Component Value Date     (H) 01/22/2024        Cardiovascular Disease Screening    Lipid Panel  Cholesterol  Lipoprotein (HDL)  Triglycerides Covered every 5 years for all Medicare beneficiaries without apparent signs or symptoms of cardiovascular disease Lab Results   Component Value Date    CHOLEST 134 01/22/2024    HDL 46 01/22/2024    LDL 70 01/22/2024    TRIG 99 01/22/2024         Electrocardiogram (EKG)   Covered if needed at Welcome to Medicare, and non-screening if indicated for medical reasons 04/29/2021      Ultrasound Screening for Abdominal Aortic Aneurysm (AAA) Covered once in a lifetime for one of the following risk factors    Men who are 65-75 years old and have ever smoked    Anyone with a family history -     Colorectal Cancer Screening  Covered for ages 50-85; only need ONE of the following:    Colonoscopy   Covered every 10 years    Covered every 2 years if patient is at high risk or previous colonoscopy was abnormal 08/22/2022    Health Maintenance   Topic Date Due    Colorectal Cancer Screening  08/22/2027       Flexible Sigmoidoscopy   Covered every 4 years -    Fecal Occult Blood Test Covered annually -   Prostate Cancer Screening    Prostate-Specific Antigen  (PSA) Annually Lab Results   Component Value Date    PSA 3.46 06/18/2018     There are no preventive care reminders to display for this patient.   Immunizations    Influenza Covered once per flu season  Please get every year 09/27/2023  No recommendations at this time    Pneumococcal Each vaccine ([id number] & [id number]) covered once after 65 Prevnar 13: 01/13/2016    Ekwzejten28: 06/08/2018     No recommendations at this time    Hepatitis B One screening covered for patients with certain risk factors   -  No recommendations at this time    Tetanus Toxoid Not covered by Medicare Part B unless medically necessary (cut with metal); may be covered with your pharmacy prescription benefits -    Tetanus, Diptheria and Pertusis TD and TDaP Not covered by Medicare Part B -  No recommendations at this time    Zoster Not covered by Medicare Part B; may be covered with your pharmacy  prescription benefits -  No recommendations at this time     Diabetes      Hemoglobin A1C Annually; if last result is elevated, may be asked to retest more frequently.    Medicare covers every 3 months Lab Results   Component Value Date     (H) 01/22/2024    A1C 6.9 (H) 01/22/2024       No recommendations at this time    Creat/alb ratio Annually Lab Results   Component Value Date    MICROALBCREA 31.0 (H) 01/22/2024       LDL Annually Lab Results   Component Value Date    LDL 70 01/22/2024       Dilated Eye Exam Annually @LUIS F@

## 2024-01-25 ENCOUNTER — HOSPITAL ENCOUNTER (OUTPATIENT)
Dept: ULTRASOUND IMAGING | Age: 84
Discharge: HOME | End: 2024-01-25
Attending: FAMILY MEDICINE
Payer: MEDICARE

## 2024-01-25 DIAGNOSIS — I65.23 BILATERAL CAROTID ARTERY STENOSIS: ICD-10-CM

## 2024-01-25 PROCEDURE — 93880 EXTRACRANIAL BILAT STUDY: CPT | Performed by: FAMILY MEDICINE

## 2024-01-26 DIAGNOSIS — I25.10 ATHEROSCLEROSIS OF NATIVE CORONARY ARTERY OF NATIVE HEART WITHOUT ANGINA PECTORIS: ICD-10-CM

## 2024-01-26 DIAGNOSIS — E11.59 TYPE 2 DIABETES MELLITUS WITH OTHER CIRCULATORY COMPLICATIONS (HCC): ICD-10-CM

## 2024-01-26 DIAGNOSIS — I65.23 BILATERAL CAROTID ARTERY STENOSIS: Primary | ICD-10-CM

## 2024-01-26 DIAGNOSIS — E78.00 HYPERCHOLESTEROLEMIA: ICD-10-CM

## 2024-01-26 DIAGNOSIS — I70.0 ATHEROSCLEROSIS OF ABDOMINAL AORTA (HCC): ICD-10-CM

## 2024-02-05 ENCOUNTER — HOSPITAL ENCOUNTER (OUTPATIENT)
Dept: CT IMAGING | Age: 84
Discharge: HOME | End: 2024-02-05
Attending: FAMILY MEDICINE
Payer: MEDICARE

## 2024-02-05 DIAGNOSIS — I65.23 BILATERAL CAROTID ARTERY STENOSIS: ICD-10-CM

## 2024-02-05 DIAGNOSIS — E11.59 TYPE 2 DIABETES MELLITUS WITH OTHER CIRCULATORY COMPLICATIONS (HCC): ICD-10-CM

## 2024-02-05 DIAGNOSIS — E78.00 HYPERCHOLESTEROLEMIA: ICD-10-CM

## 2024-02-05 DIAGNOSIS — I25.10 ATHEROSCLEROSIS OF NATIVE CORONARY ARTERY OF NATIVE HEART WITHOUT ANGINA PECTORIS: ICD-10-CM

## 2024-02-05 DIAGNOSIS — I70.0 ATHEROSCLEROSIS OF ABDOMINAL AORTA (HCC): ICD-10-CM

## 2024-02-05 PROCEDURE — 70498 CT ANGIOGRAPHY NECK: CPT | Performed by: FAMILY MEDICINE

## 2024-02-08 ENCOUNTER — HOSPITAL ENCOUNTER (OUTPATIENT)
Dept: MRI IMAGING | Age: 84
Discharge: HOME | End: 2024-02-08
Attending: PHYSICIAN ASSISTANT
Payer: MEDICARE

## 2024-02-08 ENCOUNTER — PATIENT MESSAGE (OUTPATIENT)
Dept: ORTHOPEDICS CLINIC | Facility: CLINIC | Age: 84
End: 2024-02-08

## 2024-02-08 DIAGNOSIS — S80.02XA CONTUSION OF LEFT KNEE, INITIAL ENCOUNTER: ICD-10-CM

## 2024-02-08 DIAGNOSIS — M17.12 PRIMARY OSTEOARTHRITIS OF LEFT KNEE: ICD-10-CM

## 2024-02-08 PROCEDURE — 73721 MRI JNT OF LWR EXTRE W/O DYE: CPT | Performed by: PHYSICIAN ASSISTANT

## 2024-02-08 RX ORDER — AMOXICILLIN AND CLAVULANATE POTASSIUM 875; 125 MG/1; MG/1
1 TABLET, FILM COATED ORAL 2 TIMES DAILY
Qty: 20 TABLET | Refills: 0 | Status: SHIPPED | OUTPATIENT
Start: 2024-02-08 | End: 2024-02-18

## 2024-02-08 RX ORDER — FLUTICASONE PROPIONATE 50 MCG
2 SPRAY, SUSPENSION (ML) NASAL DAILY
Qty: 1 EACH | Refills: 0 | Status: SHIPPED | OUTPATIENT
Start: 2024-02-08 | End: 2025-02-02

## 2024-02-08 NOTE — TELEPHONE ENCOUNTER
From: Chuy Dietz  To: Sincer Onel  Sent: 2/8/2024 9:51 AM CST  Subject: CTA    Good morning,  you have ordered a CT for my knee. I have not heard of the approval from my insurance. Does the appointment Max

## 2024-03-18 DIAGNOSIS — E78.5 HYPERLIPIDEMIA, UNSPECIFIED HYPERLIPIDEMIA TYPE: ICD-10-CM

## 2024-03-19 RX ORDER — ROSUVASTATIN CALCIUM 5 MG/1
5 TABLET, COATED ORAL
Qty: 45 TABLET | Refills: 0 | Status: SHIPPED | OUTPATIENT
Start: 2024-03-20

## 2024-04-30 ENCOUNTER — PATIENT MESSAGE (OUTPATIENT)
Dept: FAMILY MEDICINE CLINIC | Facility: CLINIC | Age: 84
End: 2024-04-30

## 2024-05-01 ENCOUNTER — LAB ENCOUNTER (OUTPATIENT)
Dept: LAB | Age: 84
End: 2024-05-01
Attending: FAMILY MEDICINE
Payer: MEDICARE

## 2024-05-01 DIAGNOSIS — E78.5 HYPERLIPIDEMIA, UNSPECIFIED HYPERLIPIDEMIA TYPE: ICD-10-CM

## 2024-05-01 DIAGNOSIS — E11.9 DIABETES MELLITUS TYPE 2 WITHOUT RETINOPATHY (HCC): ICD-10-CM

## 2024-05-01 DIAGNOSIS — Z12.5 SCREENING FOR PROSTATE CANCER: ICD-10-CM

## 2024-05-01 LAB
ALBUMIN SERPL-MCNC: 3.9 G/DL (ref 3.4–5)
ALBUMIN/GLOB SERPL: 1.1 {RATIO} (ref 1–2)
ALP LIVER SERPL-CCNC: 52 U/L
ALT SERPL-CCNC: 27 U/L
ANION GAP SERPL CALC-SCNC: 6 MMOL/L (ref 0–18)
AST SERPL-CCNC: 19 U/L (ref 15–37)
BASOPHILS # BLD AUTO: 0.03 X10(3) UL (ref 0–0.2)
BASOPHILS NFR BLD AUTO: 0.4 %
BILIRUB SERPL-MCNC: 1 MG/DL (ref 0.1–2)
BILIRUB UR QL STRIP.AUTO: NEGATIVE
BUN BLD-MCNC: 17 MG/DL (ref 9–23)
CALCIUM BLD-MCNC: 9.2 MG/DL (ref 8.5–10.1)
CHLORIDE SERPL-SCNC: 106 MMOL/L (ref 98–112)
CHOLEST SERPL-MCNC: 122 MG/DL (ref ?–200)
CLARITY UR REFRACT.AUTO: CLEAR
CO2 SERPL-SCNC: 25 MMOL/L (ref 21–32)
COLOR UR AUTO: YELLOW
COMPLEXED PSA SERPL-MCNC: 4.59 NG/ML (ref ?–4)
CREAT BLD-MCNC: 0.86 MG/DL
CREAT UR-SCNC: 179 MG/DL
EGFRCR SERPLBLD CKD-EPI 2021: 86 ML/MIN/1.73M2 (ref 60–?)
EOSINOPHIL # BLD AUTO: 0.19 X10(3) UL (ref 0–0.7)
EOSINOPHIL NFR BLD AUTO: 2.8 %
ERYTHROCYTE [DISTWIDTH] IN BLOOD BY AUTOMATED COUNT: 11.9 %
EST. AVERAGE GLUCOSE BLD GHB EST-MCNC: 151 MG/DL (ref 68–126)
FASTING PATIENT LIPID ANSWER: YES
FASTING STATUS PATIENT QL REPORTED: YES
GLOBULIN PLAS-MCNC: 3.5 G/DL (ref 2.8–4.4)
GLUCOSE BLD-MCNC: 141 MG/DL (ref 70–99)
GLUCOSE UR STRIP.AUTO-MCNC: NORMAL MG/DL
HBA1C MFR BLD: 6.9 % (ref ?–5.7)
HCT VFR BLD AUTO: 48.4 %
HDLC SERPL-MCNC: 41 MG/DL (ref 40–59)
HGB BLD-MCNC: 16.1 G/DL
IMM GRANULOCYTES # BLD AUTO: 0.02 X10(3) UL (ref 0–1)
IMM GRANULOCYTES NFR BLD: 0.3 %
KETONES UR STRIP.AUTO-MCNC: NEGATIVE MG/DL
LDLC SERPL CALC-MCNC: 64 MG/DL (ref ?–100)
LEUKOCYTE ESTERASE UR QL STRIP.AUTO: 25
LYMPHOCYTES # BLD AUTO: 1.7 X10(3) UL (ref 1–4)
LYMPHOCYTES NFR BLD AUTO: 24.9 %
MCH RBC QN AUTO: 31.6 PG (ref 26–34)
MCHC RBC AUTO-ENTMCNC: 33.3 G/DL (ref 31–37)
MCV RBC AUTO: 95.1 FL
MICROALBUMIN UR-MCNC: 4.88 MG/DL
MICROALBUMIN/CREAT 24H UR-RTO: 27.3 UG/MG (ref ?–30)
MONOCYTES # BLD AUTO: 0.59 X10(3) UL (ref 0.1–1)
MONOCYTES NFR BLD AUTO: 8.7 %
NEUTROPHILS # BLD AUTO: 4.29 X10 (3) UL (ref 1.5–7.7)
NEUTROPHILS # BLD AUTO: 4.29 X10(3) UL (ref 1.5–7.7)
NEUTROPHILS NFR BLD AUTO: 62.9 %
NITRITE UR QL STRIP.AUTO: NEGATIVE
NONHDLC SERPL-MCNC: 81 MG/DL (ref ?–130)
OSMOLALITY SERPL CALC.SUM OF ELEC: 288 MOSM/KG (ref 275–295)
PH UR STRIP.AUTO: 6 [PH] (ref 5–8)
PLATELET # BLD AUTO: 258 10(3)UL (ref 150–450)
POTASSIUM SERPL-SCNC: 4.1 MMOL/L (ref 3.5–5.1)
PROT SERPL-MCNC: 7.4 G/DL (ref 6.4–8.2)
RBC # BLD AUTO: 5.09 X10(6)UL
RBC UR QL AUTO: NEGATIVE
SODIUM SERPL-SCNC: 137 MMOL/L (ref 136–145)
SP GR UR STRIP.AUTO: 1.02 (ref 1–1.03)
TRIGL SERPL-MCNC: 88 MG/DL (ref 30–149)
TSI SER-ACNC: 2.62 MIU/ML (ref 0.36–3.74)
UROBILINOGEN UR STRIP.AUTO-MCNC: NORMAL MG/DL
VLDLC SERPL CALC-MCNC: 13 MG/DL (ref 0–30)
WBC # BLD AUTO: 6.8 X10(3) UL (ref 4–11)

## 2024-05-01 PROCEDURE — 81001 URINALYSIS AUTO W/SCOPE: CPT

## 2024-05-01 PROCEDURE — 87086 URINE CULTURE/COLONY COUNT: CPT

## 2024-05-01 PROCEDURE — 80053 COMPREHEN METABOLIC PANEL: CPT

## 2024-05-01 PROCEDURE — 82043 UR ALBUMIN QUANTITATIVE: CPT

## 2024-05-01 PROCEDURE — 84443 ASSAY THYROID STIM HORMONE: CPT

## 2024-05-01 PROCEDURE — 85025 COMPLETE CBC W/AUTO DIFF WBC: CPT

## 2024-05-01 PROCEDURE — 82570 ASSAY OF URINE CREATININE: CPT

## 2024-05-01 PROCEDURE — 80061 LIPID PANEL: CPT

## 2024-05-01 PROCEDURE — 83036 HEMOGLOBIN GLYCOSYLATED A1C: CPT

## 2024-05-01 NOTE — TELEPHONE ENCOUNTER
Can you please clarify if you would like to see patient this month?  Per LOV Schedule medication check for January.  Do fasting blood work prior to that visit.    Last office visit was January.  Do you want to see him this month?

## 2024-05-01 NOTE — TELEPHONE ENCOUNTER
From: Chuy Dietz  To: Corinne Maria  Sent: 4/30/2024 8:44 PM CDT  Subject: Next visit    Hi, I am confused about my next visit to you. When will this be. And do I go to the lab on or before May 3rd?

## 2024-05-02 DIAGNOSIS — E11.59 TYPE 2 DIABETES MELLITUS WITH OTHER CIRCULATORY COMPLICATIONS (HCC): Primary | ICD-10-CM

## 2024-05-02 NOTE — TELEPHONE ENCOUNTER
Will see patient in July for medication check please set up an appointment.  There is also result note for him.  Thank you

## 2024-06-22 ENCOUNTER — HOSPITAL ENCOUNTER (OUTPATIENT)
Age: 84
Discharge: HOME | End: 2024-06-22
Attending: EMERGENCY MEDICINE

## 2024-06-22 ENCOUNTER — APPOINTMENT (OUTPATIENT)
Dept: GENERAL RADIOLOGY | Age: 84
End: 2024-06-22
Attending: EMERGENCY MEDICINE

## 2024-06-22 VITALS
WEIGHT: 202 LBS | BODY MASS INDEX: 27.36 KG/M2 | TEMPERATURE: 98 F | HEART RATE: 79 BPM | SYSTOLIC BLOOD PRESSURE: 124 MMHG | OXYGEN SATURATION: 98 % | RESPIRATION RATE: 16 BRPM | HEIGHT: 72 IN | DIASTOLIC BLOOD PRESSURE: 75 MMHG
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
## 2024-06-22 DIAGNOSIS — S00.03XA CONTUSION OF SCALP, INITIAL ENCOUNTER: Primary | ICD-10-CM

## 2024-06-22 PROCEDURE — 99213 OFFICE O/P EST LOW 20 MIN: CPT

## 2024-06-22 PROCEDURE — 99214 OFFICE O/P EST MOD 30 MIN: CPT

## 2024-06-22 PROCEDURE — 71046 X-RAY EXAM CHEST 2 VIEWS: CPT | Performed by: EMERGENCY MEDICINE
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
## 2024-06-22 NOTE — DISCHARGE INSTRUCTIONS
Follow-up with your primary care doctor  Apply cold compresses to your contusion  Continue your home medications  Return if any worsening symptoms or new concerns

## 2024-06-26 DIAGNOSIS — E78.5 HYPERLIPIDEMIA, UNSPECIFIED HYPERLIPIDEMIA TYPE: ICD-10-CM

## 2024-06-26 RX ORDER — ROSUVASTATIN CALCIUM 5 MG/1
TABLET, COATED ORAL
Qty: 45 TABLET | Refills: 0 | Status: SHIPPED | OUTPATIENT
Start: 2024-06-26

## 2024-06-26 NOTE — TELEPHONE ENCOUNTER
LOV:  1/24/2024 for: MA -Supervisit   Patient advised to RTC on:  6 months (around 7/24/2024).   Nov:08/07/2024    Medication Quantity Refills Start End   ROSUVASTATIN 5 MG Oral Tab 45 tablet 0 3/20/2024 --   Sig:   Take 1 tablet (5 mg total) by mouth 3 (three) times a week. ON MON, WED, FRI

## 2024-08-02 ENCOUNTER — LAB ENCOUNTER (OUTPATIENT)
Dept: LAB | Age: 84
End: 2024-08-02
Attending: FAMILY MEDICINE
Payer: MEDICARE

## 2024-08-02 DIAGNOSIS — E11.59 TYPE 2 DIABETES MELLITUS WITH OTHER CIRCULATORY COMPLICATIONS (HCC): ICD-10-CM

## 2024-08-02 LAB
ALBUMIN SERPL-MCNC: 4.7 G/DL (ref 3.2–4.8)
ALBUMIN/GLOB SERPL: 1.9 {RATIO} (ref 1–2)
ALP LIVER SERPL-CCNC: 49 U/L
ALT SERPL-CCNC: 19 U/L
ANION GAP SERPL CALC-SCNC: 8 MMOL/L (ref 0–18)
AST SERPL-CCNC: 20 U/L (ref ?–34)
BILIRUB SERPL-MCNC: 1 MG/DL (ref 0.2–1.1)
BUN BLD-MCNC: 14 MG/DL (ref 9–23)
CALCIUM BLD-MCNC: 9.9 MG/DL (ref 8.7–10.4)
CHLORIDE SERPL-SCNC: 106 MMOL/L (ref 98–112)
CO2 SERPL-SCNC: 25 MMOL/L (ref 21–32)
CREAT BLD-MCNC: 0.94 MG/DL
CREAT UR-SCNC: 127.4 MG/DL
EGFRCR SERPLBLD CKD-EPI 2021: 81 ML/MIN/1.73M2 (ref 60–?)
EST. AVERAGE GLUCOSE BLD GHB EST-MCNC: 157 MG/DL (ref 68–126)
FASTING STATUS PATIENT QL REPORTED: YES
GLOBULIN PLAS-MCNC: 2.5 G/DL (ref 2–3.5)
GLUCOSE BLD-MCNC: 142 MG/DL (ref 70–99)
HBA1C MFR BLD: 7.1 % (ref ?–5.7)
MICROALBUMIN UR-MCNC: 2 MG/DL
MICROALBUMIN/CREAT 24H UR-RTO: 15.7 UG/MG (ref ?–30)
OSMOLALITY SERPL CALC.SUM OF ELEC: 291 MOSM/KG (ref 275–295)
POTASSIUM SERPL-SCNC: 4.2 MMOL/L (ref 3.5–5.1)
PROT SERPL-MCNC: 7.2 G/DL (ref 5.7–8.2)
SODIUM SERPL-SCNC: 139 MMOL/L (ref 136–145)

## 2024-08-02 PROCEDURE — 80053 COMPREHEN METABOLIC PANEL: CPT

## 2024-08-02 PROCEDURE — 83036 HEMOGLOBIN GLYCOSYLATED A1C: CPT

## 2024-08-02 PROCEDURE — 82043 UR ALBUMIN QUANTITATIVE: CPT

## 2024-08-02 PROCEDURE — 82570 ASSAY OF URINE CREATININE: CPT

## 2024-08-07 ENCOUNTER — OFFICE VISIT (OUTPATIENT)
Dept: FAMILY MEDICINE CLINIC | Facility: CLINIC | Age: 84
End: 2024-08-07
Payer: MEDICARE

## 2024-08-07 VITALS
RESPIRATION RATE: 18 BRPM | HEIGHT: 72 IN | DIASTOLIC BLOOD PRESSURE: 63 MMHG | WEIGHT: 205 LBS | SYSTOLIC BLOOD PRESSURE: 130 MMHG | TEMPERATURE: 97 F | BODY MASS INDEX: 27.77 KG/M2 | HEART RATE: 78 BPM

## 2024-08-07 DIAGNOSIS — E11.9 DIABETES MELLITUS TYPE 2 WITHOUT RETINOPATHY (HCC): ICD-10-CM

## 2024-08-07 DIAGNOSIS — I70.0 ATHEROSCLEROSIS OF ABDOMINAL AORTA (HCC): ICD-10-CM

## 2024-08-07 DIAGNOSIS — I25.10 CORONARY ARTERY CALCIFICATION SEEN ON CT SCAN: ICD-10-CM

## 2024-08-07 DIAGNOSIS — J32.0 CHRONIC MAXILLARY SINUSITIS: ICD-10-CM

## 2024-08-07 DIAGNOSIS — R97.20 ELEVATED PSA: ICD-10-CM

## 2024-08-07 DIAGNOSIS — E78.5 HYPERLIPIDEMIA, UNSPECIFIED HYPERLIPIDEMIA TYPE: Primary | ICD-10-CM

## 2024-08-07 DIAGNOSIS — E11.59 TYPE 2 DIABETES MELLITUS WITH OTHER CIRCULATORY COMPLICATIONS (HCC): ICD-10-CM

## 2024-08-07 PROCEDURE — 3078F DIAST BP <80 MM HG: CPT | Performed by: FAMILY MEDICINE

## 2024-08-07 PROCEDURE — G2211 COMPLEX E/M VISIT ADD ON: HCPCS | Performed by: FAMILY MEDICINE

## 2024-08-07 PROCEDURE — 3075F SYST BP GE 130 - 139MM HG: CPT | Performed by: FAMILY MEDICINE

## 2024-08-07 PROCEDURE — 99214 OFFICE O/P EST MOD 30 MIN: CPT | Performed by: FAMILY MEDICINE

## 2024-08-07 PROCEDURE — 3008F BODY MASS INDEX DOCD: CPT | Performed by: FAMILY MEDICINE

## 2024-08-07 PROCEDURE — 1159F MED LIST DOCD IN RCRD: CPT | Performed by: FAMILY MEDICINE

## 2024-08-07 PROCEDURE — 1160F RVW MEDS BY RX/DR IN RCRD: CPT | Performed by: FAMILY MEDICINE

## 2024-08-07 RX ORDER — ROSUVASTATIN CALCIUM 5 MG/1
5 TABLET, COATED ORAL NIGHTLY
Qty: 90 TABLET | Refills: 1 | Status: SHIPPED | OUTPATIENT
Start: 2024-08-07

## 2024-08-07 NOTE — PROGRESS NOTES
Chuy Dietz is a 83 year old male.  Hyperlipidemia, diabetes, BPH, elevated PSA, coronary arthrosclerosis, sinus problem.  HPI:   Hyperlipidemia taking rosuvastatin 5 mg daily doing well with medication.  Needs refill.  Continue monitoring.    Diabetes type 2 hemoglobin A1c 7.1 borderline elevated.  Patient will try to decrease carbs and sugars in the diet.  He is exercising on the bike 30 minutes every day.  Eye examination is coming in November.  Denies any numbness or tingling of the feet.  Foot examination completed in January.  Will recheck blood work in January 2025 if sugar would be staying elevated will increase medication    BPH/elevated PSA patient saw urologist Dr. Gallegos, would like to see different urologist.  Referral was placed to see Dr. Hearn.  Having some urinary urgency when getting up after sitting for a while.    Current arthrosclerosis/aortic atherosclerosis on medication monitor.  Will check ultrasound of the carotids in January 2025.    Patient complains of having chronic sinus problem.  Having headache with head movement for work.  There was chronic sinus changes on the CT scan from this year.  Patient prefers not to be on any antibiotics or steroids at this time would like to see ENT doctor referral was placed.    Current Outpatient Medications   Medication Sig Dispense Refill    metFORMIN 500 MG Oral Tab Take 1 tablet (500 mg total) by mouth daily with breakfast. 90 tablet 1    rosuvastatin 5 MG Oral Tab Take 1 tablet (5 mg total) by mouth nightly. 90 tablet 1    tamsulosin (FLOMAX) cap 1 capsule (0.4 mg total) daily.      aspirin 81 MG Oral Tab Take 1 tablet (81 mg total) by mouth daily.      Multiple Vitamins-Minerals (SUPER HIGH VITAMINS/MINERALS) Oral Tab Take 1 tablet by mouth daily.        Past Medical History:    Asthma (HCC)    Calculus of kidney    Diabetes (HCC)    Disorder of liver    fatty liver per problem list    High cholesterol    Mild intermittent asthma (HCC)     Mild intermittent asthma without complication (HCC)    Obesity (BMI 30.0-34.9)    Short of breath on exertion      Social History:  Social History     Socioeconomic History    Marital status:    Tobacco Use    Smoking status: Never    Smokeless tobacco: Never   Vaping Use    Vaping status: Never Used   Substance and Sexual Activity    Alcohol use: Yes     Alcohol/week: 0.0 standard drinks of alcohol     Comment: 1 Lopez Lite at dinner    Drug use: No   Other Topics Concern    Caffeine Concern Yes     Comment: 2 cups of coffee a day    Exercise Yes     Comment: daily    Seat Belt Yes        REVIEW OF SYSTEMS:   GENERAL HEALTH: feels well otherwise  SKIN: denies any unusual skin lesions or rashes  HEENT  sinus headaches/congestion no runny nose no sore throat  Neck no neck pain   RESPIRATORY: denies shortness of breath with exertion  CARDIOVASCULAR: denies chest pain on exertion  GI: denies abdominal pain and denies heartburn  NEURO: denies headaches  Psych normal mood      EXAM:   /63 (BP Location: Left arm, Patient Position: Sitting, Cuff Size: adult)   Pulse 78   Temp 96.9 °F (36.1 °C) (Temporal)   Resp 18   Ht 6' (1.829 m)   Wt 205 lb (93 kg)   BMI 27.80 kg/m²   GENERAL: well developed, well nourished,in no apparent distress  SKIN: no rashes,no suspicious lesions  HEENT: atraumatic, normocephalic,ears and throat are clear  NECK: supple,no adenopathy,  LUNGS: clear to auscultation  CARDIO: RRR without murmur  GI: good BS's,no masses, HSM or tenderness  EXTREMITIES: no edema  Psychiatric - alert  and oriented x3, normal mood      Results for orders placed or performed in visit on 08/02/24   Comp Metabolic Panel (14) [E]   Result Value Ref Range    Glucose 142 (H) 70 - 99 mg/dL    Sodium 139 136 - 145 mmol/L    Potassium 4.2 3.5 - 5.1 mmol/L    Chloride 106 98 - 112 mmol/L    CO2 25.0 21.0 - 32.0 mmol/L    Anion Gap 8 0 - 18 mmol/L    BUN 14 9 - 23 mg/dL    Creatinine 0.94 0.70 - 1.30 mg/dL     Calcium, Total 9.9 8.7 - 10.4 mg/dL    Calculated Osmolality 291 275 - 295 mOsm/kg    eGFR-Cr 81 >=60 mL/min/1.73m2    AST 20 <34 U/L    ALT 19 10 - 49 U/L    Alkaline Phosphatase 49 45 - 117 U/L    Bilirubin, Total 1.0 0.2 - 1.1 mg/dL    Total Protein 7.2 5.7 - 8.2 g/dL    Albumin 4.7 3.2 - 4.8 g/dL    Globulin  2.5 2.0 - 3.5 g/dL    A/G Ratio 1.9 1.0 - 2.0    Patient Fasting for CMP? Yes    Hemoglobin A1C   Result Value Ref Range    HgbA1C 7.1 (H) <5.7 %    Estimated Average Glucose 157 (H) 68 - 126 mg/dL   Microalb/Creat Ratio, Random Urine [E]   Result Value Ref Range    Microalbumin, Urine 2.00 mg/dL    Creatinine Ur Random 127.40 mg/dL    Malb/Cre Calc 15.7 <=30.0 ug/mg      ASSESSMENT AND PLAN:     Encounter Diagnoses   Name Primary?    Hyperlipidemia, unspecified hyperlipidemia type Yes    Diabetes mellitus type 2 without retinopathy (HCC)     Elevated PSA     Chronic maxillary sinusitis     Atherosclerosis of abdominal aorta (HCC)     Coronary artery calcification seen on CT scan     Type 2 diabetes mellitus with other circulatory complications (HCC)        Orders Placed This Encounter   Procedures    Comp Metabolic Panel (14)    Hemoglobin A1C    Lipid Panel    Microalb/Creat Ratio, Random Urine       Meds & Refills for this Visit:  Requested Prescriptions     Signed Prescriptions Disp Refills    metFORMIN 500 MG Oral Tab 90 tablet 1     Sig: Take 1 tablet (500 mg total) by mouth daily with breakfast.    rosuvastatin 5 MG Oral Tab 90 tablet 1     Sig: Take 1 tablet (5 mg total) by mouth nightly.   Continue current meds.   Watch diet for fats and carbs. Have less bread l,ess rice, less pasta ,less potatoes and sweets.  Stay active.    See urologist for evaluation of elevated PSA.  See ENT doctor for evaluation of your sinuses.  Set up an appointment with eye doctor for November of this year.  Schedule super visit for January 2025 and do fasting blood work prior to that visit.    Imaging &  Consults:  OPHTHALMOLOGY - INTERNAL  UROLOGY - INTERNAL  ENT - INTERNAL    The patient indicates understanding of these issues and agrees to the plan.  The patient is asked to return in January 2025.  The note was dictated using speech recognition software.  Accuracy and grammar in transcription may be subject to error.

## 2024-08-07 NOTE — PATIENT INSTRUCTIONS
Continue current meds.   Watch diet for fats and carbs. Have less bread l,ess rice, less pasta ,less potatoes and sweets.  Stay active.    See urologist for evaluation of elevated PSA.  See ENT doctor for evaluation of your sinuses.  Set up an appointment with eye doctor for November of this year.  Schedule super visit for January 2025 and do fasting blood work prior to that visit.    Dear Patients,  At [facility], patient care is our priority. Please review and acknowledge our office refill policy so we can best serve you.  EMG 36 REFILL POLICY  Allow 3 business days for refills; controlled substances may take longer.  Contact your pharmacy at least 5-7 business days prior to running out of medication and have them send an electronic request or submit through the \"request refill\" option thru your [employer] account. No need to do both, as multiple requests will create an automated [employer] message to notify of a denial for one of the duplicated requests, causing you undue confusion.   Refills are NOT addressed on weekends; covering physicians do not authorize routine medications on weekends.  No narcotics or controlled substances are refilled after noon on Fridays or by on call physicians.  By law, narcotics cannot be faxed or phoned into your pharmacy.  If your prescription is due for a refill, you may be due for a follow up appointment. Please call our office at [phone] to make an appointment or schedule an appointment via [employer].  To best provide you care, patients receiving routine medications need to be seen at least twice a year. Patients receiving narcotic/controlled substance medications need to be seen at least once every 3 months.  In the event that your preferred pharmacy does not have the requested medication in stock (ie Backordered), it is your responsibility to find another pharmacy that has the requested medication available. We will gladly send a new prescription to that  pharmacy at your request.  controlled substances may not be able to be filled out of state due to license restrictions.  If you have a planned trip, it's best to call your pharmacy at least 5-7 business days to prevent any delays in your medication refill.    Thank you for your understanding of this important matter.  Sincerely,    [facility], [address] [place] [id number]  Dept: [phone]

## 2024-09-16 ENCOUNTER — OFFICE VISIT (OUTPATIENT)
Dept: SURGERY | Facility: CLINIC | Age: 84
End: 2024-09-16
Payer: MEDICARE

## 2024-09-16 VITALS
BODY MASS INDEX: 27.77 KG/M2 | HEIGHT: 72 IN | HEART RATE: 58 BPM | DIASTOLIC BLOOD PRESSURE: 70 MMHG | SYSTOLIC BLOOD PRESSURE: 137 MMHG | WEIGHT: 205 LBS

## 2024-09-16 DIAGNOSIS — N13.8 BPH WITH OBSTRUCTION/LOWER URINARY TRACT SYMPTOMS: ICD-10-CM

## 2024-09-16 DIAGNOSIS — R97.20 ELEVATED PSA: Primary | ICD-10-CM

## 2024-09-16 DIAGNOSIS — Z87.442 HISTORY OF NEPHROLITHIASIS: ICD-10-CM

## 2024-09-16 DIAGNOSIS — R31.29 MICROSCOPIC HEMATURIA: ICD-10-CM

## 2024-09-16 DIAGNOSIS — K40.90 LEFT INGUINAL HERNIA: ICD-10-CM

## 2024-09-16 DIAGNOSIS — N40.1 BPH WITH OBSTRUCTION/LOWER URINARY TRACT SYMPTOMS: ICD-10-CM

## 2024-09-16 PROCEDURE — 3075F SYST BP GE 130 - 139MM HG: CPT | Performed by: UROLOGY

## 2024-09-16 PROCEDURE — 3078F DIAST BP <80 MM HG: CPT | Performed by: UROLOGY

## 2024-09-16 PROCEDURE — 3008F BODY MASS INDEX DOCD: CPT | Performed by: UROLOGY

## 2024-09-16 PROCEDURE — 1159F MED LIST DOCD IN RCRD: CPT | Performed by: UROLOGY

## 2024-09-16 PROCEDURE — 99204 OFFICE O/P NEW MOD 45 MIN: CPT | Performed by: UROLOGY

## 2024-09-16 RX ORDER — FINASTERIDE 5 MG/1
5 TABLET, FILM COATED ORAL DAILY
Qty: 90 TABLET | Refills: 1 | Status: SHIPPED | OUTPATIENT
Start: 2024-09-16

## 2024-09-16 NOTE — PROGRESS NOTES
[facility]  Initial Office Consultation    HPI:   Chuy Dietz is a 83 year old male here today for consultation at the request of, and a copy of this note will be sent to, Corinne Maria MD.    1.  BPH with LUTS  2.  Elevated PSA  3.  Microscopic hematuria  Patient with mildly elevated screening PSA levels since October 2022.  Levels have been largely stable, most recently 4.59 ng/mL from 5/1/2024.    History of nephrolithiasis.  He underwent left ureteroscopy with laser lithotripsy by Dr. Boykin 3/2021.  He passed a 4 mm stone in 2023.  Currently denies flank pain.    He has BPH with bothersome obstructive LUTS.  He has been on tamsulosin for a few years.      He reports an IPSS of 15 (1/1/1///1/3) with a quality-of-life score of 4.  He is mostly bothered by urinary urgency and weak stream.  He wakes up about 3 times at night to urinate.    Urinalysis from 5/1/2024 revealing microscopic hematuria with 3-5 RBCs per hpf.    - 9/2024 consult: Bladder scan  mL.  Continue Flomax.  Start finasteride.  Monitor PSA.  Microhematuria evaluation.      PAST MEDICAL HISTORY: Hyperlipidemia.  Diabetes.  Nephrolithiasis.    PAST SURGICAL HISTORY: Ureteroscopy with laser lithotripsy 2021.  Total knee replacement.  Appendectomy 1961.  Cataract surgery    SOCIAL HISTORY:  and has 2 daughters.  No smoking or illicit drug use.  Social alcohol.  Retired and used to work in finance.     Family History   Problem Relation Age of Onset    Cancer Father         lung ca, smoker    Other (Other) Mother         thyroid problem, tracheostomy,      Allergies: Patient has no known allergies.      REVIEW OF SYSTEMS:  Pertinent positives and negatives per HPI. A 12-point ROS was performed and is otherwise negative.       EXAM:  /70 (BP Location: Left arm, Patient Position: Sitting, Cuff Size: adult)   Pulse 58   Ht 6' (1.829 m)   Wt 205 lb (93 kg)   BMI 27.80 kg/m²     Physical  Exam  Constitutional:       Appearance: He is well-developed.   HENT:      Head: Normocephalic.   Eyes:      General: No scleral icterus.  Cardiovascular:      Rate and Rhythm: Normal rate.   Pulmonary:      Effort: Pulmonary effort is normal.   Abdominal:      General: There is no distension.      Palpations: Abdomen is soft.      Tenderness: There is no abdominal tenderness.      Hernia: A hernia is present. Hernia is present in the left inguinal area.   Genitourinary:     Penis: Uncircumcised.       Testes: Normal.      Comments: MATEO revealing a 2+ enlarged prostate, firm, nontender.  Skin:     General: Skin is warm and dry.   Neurological:      Mental Status: He is alert and oriented to person, place, and time.   Psychiatric:         Mood and Affect: Mood normal.         Behavior: Behavior normal.       LABS:     PSA Screen   Latest Ref Rng <=4.00 ng/mL   1/14/2016 2.0   9/15/2017 2.440   6/18/2018 3.46   8/19/2019 3.86    1/13/2021 3.36    10/6/2022 4.35 (H)    5/1/2024 4.59 (H)       Legend:  (H) High      IMAGING:  No results found.      IMPRESSION:  83 year old male with elevated screening PSA levels, history of nephrolithiasis, microscopic hematuria, BPH with LUTS.    Findings reviewed with patient at length.  We discussed relevant anatomy and physiology.    Regarding his PSA levels: Discussed prostate cancer screening per guidelines including benefits and risks.  His PSA levels are within acceptable range for a male of his age and ethnicity.  The levels have been relatively stable for the past 2 years.  We discussed continuing monitoring versus discontinuing further prostate cancer screening.    Regarding his BPH with LUTS: He has mildly elevated PVRs.  Discussed options including continuing current management, maximizing medical therapy with the addition of a 5 alpha reductase inhibitor, or considering minimally invasive surgical treatment options for BPH.  Benefits, risks, side effects, and alternatives  of treatment were reviewed.  Patient agreeable to maximizing medical therapy with the addition of a 5 alpha reductase inhibitor.    We discussed the finding of microscopic hematuria on his urinalysis from 5/2024.  Discussed relevant anatomy and physiology and different possible differential diagnoses.  Recommendation for further evaluation with CT urogram and cystoscopy.  Patient agreeable.    History of nephrolithiasis: Patient currently asymptomatic.  CT urogram will shed more light.    He has a left inguinal hernia noticed on exam today.  Patient excepted referral to general surgery.  He sees a primary care physician with [facility] and thus will be referred to a general surgeon at [facility].    All questions answered      PLAN:  1.  Continue Flomax.  2.  Start finasteride 5 mg daily for BPH with LUTS.  3.  CT urogram for further evaluation of microhematuria.  4.  Schedule cystoscopy to complete evaluation of microhematuria.  5.  Monitor PSA levels, age appropriate.    Donavon Campbell MD  9/16/2024

## 2024-09-18 ENCOUNTER — PATIENT MESSAGE (OUTPATIENT)
Dept: SURGERY | Facility: CLINIC | Age: 84
End: 2024-09-18

## 2024-09-20 NOTE — TELEPHONE ENCOUNTER
From: Chuy Dietz  To: Donavon Campbell  Sent: 9/18/2024 12:55 PM CDT  Subject: CT Urogram    Hi,  I have scheduled the procedure as ordered. Can you pls check my entries about anesthesia and hydration (no) etc.  Max

## 2024-09-26 ENCOUNTER — HOSPITAL ENCOUNTER (OUTPATIENT)
Dept: CT IMAGING | Facility: HOSPITAL | Age: 84
Discharge: HOME | End: 2024-09-26
Attending: UROLOGY
Payer: MEDICARE

## 2024-09-26 DIAGNOSIS — R31.29 MICROSCOPIC HEMATURIA: ICD-10-CM

## 2024-09-26 LAB
CREAT BLD-MCNC: 1 MG/DL
EGFRCR SERPLBLD CKD-EPI 2021: 75 ML/MIN/1.73M2 (ref 60–?)

## 2024-09-26 PROCEDURE — 74178 CT ABD&PLV WO CNTR FLWD CNTR: CPT | Performed by: UROLOGY

## 2024-09-26 PROCEDURE — 82565 ASSAY OF CREATININE: CPT

## 2024-10-10 ENCOUNTER — PATIENT MESSAGE (OUTPATIENT)
Dept: SURGERY | Facility: CLINIC | Age: 84
End: 2024-10-10

## 2024-10-10 ENCOUNTER — PATIENT MESSAGE (OUTPATIENT)
Dept: FAMILY MEDICINE CLINIC | Facility: CLINIC | Age: 84
End: 2024-10-10

## 2024-10-15 ENCOUNTER — TELEPHONE (OUTPATIENT)
Dept: FAMILY MEDICINE CLINIC | Facility: CLINIC | Age: 84
End: 2024-10-15

## 2024-10-15 NOTE — TELEPHONE ENCOUNTER
Kathy from [facility] surgery Dr. Hendrix's office stating patient has robotic bilateral inguinal hernia repair with mesh scheduled for 11/13/24.  Pending PCP clearance.     Patient needs CMP and EKG done.    Can fax labs, EKG, clearance to Dr Hendrix at fax # [phone].

## 2024-10-15 NOTE — TELEPHONE ENCOUNTER
Please schedule patient okay to use SDA for him at least 2 weeks prior to surgery.  Please also ask surgeon office to fax us official request .  Thank you

## 2024-10-16 ENCOUNTER — PROCEDURE (OUTPATIENT)
Dept: SURGERY | Facility: CLINIC | Age: 84
End: 2024-10-16
Payer: MEDICARE

## 2024-10-16 VITALS
HEART RATE: 70 BPM | WEIGHT: 205 LBS | DIASTOLIC BLOOD PRESSURE: 76 MMHG | BODY MASS INDEX: 27.77 KG/M2 | HEIGHT: 72 IN | SYSTOLIC BLOOD PRESSURE: 127 MMHG

## 2024-10-16 DIAGNOSIS — N20.0 NEPHROLITHIASIS: ICD-10-CM

## 2024-10-16 DIAGNOSIS — N13.8 BPH WITH OBSTRUCTION/LOWER URINARY TRACT SYMPTOMS: ICD-10-CM

## 2024-10-16 DIAGNOSIS — R31.29 MICROSCOPIC HEMATURIA: Primary | ICD-10-CM

## 2024-10-16 DIAGNOSIS — N40.1 BPH WITH OBSTRUCTION/LOWER URINARY TRACT SYMPTOMS: ICD-10-CM

## 2024-10-16 PROCEDURE — 99214 OFFICE O/P EST MOD 30 MIN: CPT | Performed by: UROLOGY

## 2024-10-16 PROCEDURE — 1159F MED LIST DOCD IN RCRD: CPT | Performed by: UROLOGY

## 2024-10-16 PROCEDURE — 3074F SYST BP LT 130 MM HG: CPT | Performed by: UROLOGY

## 2024-10-16 PROCEDURE — 3008F BODY MASS INDEX DOCD: CPT | Performed by: UROLOGY

## 2024-10-16 PROCEDURE — 52000 CYSTOURETHROSCOPY: CPT | Performed by: UROLOGY

## 2024-10-16 PROCEDURE — 3078F DIAST BP <80 MM HG: CPT | Performed by: UROLOGY

## 2024-10-16 RX ORDER — CIPROFLOXACIN 500 MG/1
500 TABLET, FILM COATED ORAL ONCE
Status: COMPLETED | OUTPATIENT
Start: 2024-10-16 | End: 2024-10-16

## 2024-10-16 RX ADMIN — CIPROFLOXACIN 500 MG: 500 TABLET, FILM COATED ORAL at 13:59:00

## 2024-10-16 NOTE — PROGRESS NOTES
[facility] Urology  Follow-Up Visit    HPI: Chuy Dietz is a 83 year old male presents for a follow up visit. Patient was last seen on 9/16/2024.    INTERVAL HISTORY: Here for cystoscopy to complete evaluation of microscopic hematuria.     CT urogram completed 9/26/2024 revealing multiple left nonobstructing kidney stones in the upper pole with a dominant stone measuring 13 x 10 mm.  Additional smaller nonobstructing calculi within a calyceal diverticulum of the upper pole distal to the larger calculus.  No hydronephrosis.    He denies flank pain.    On tamsulosin for BPH with LUTS and incomplete bladder emptying.  Finasteride started 9/2024 to maximize medical therapy.  He feels mild improvement in symptoms.  Still reports urinary urgency and weak stream.    Mildly elevated PSA, age appropriate.  Decided to monitor.    1.  BPH with LUTS  2.  Elevated PSA  3.  Microscopic hematuria  4.  Nephrolithiasis  Patient with mildly elevated screening PSA levels since October 2022.  Levels have been largely stable, most recently 4.59 ng/mL from 5/1/2024.     History of nephrolithiasis.  He underwent left ureteroscopy with laser lithotripsy by Dr. Boykin 3/2021.  He passed a 4 mm stone in 2023.  Currently denies flank pain.     He has BPH with bothersome obstructive LUTS.  He has been on tamsulosin for a few years.       He reports an IPSS of 15 (1/1/1///1/3) with a quality-of-life score of 4.  He is mostly bothered by urinary urgency and weak stream.  He wakes up about 3 times at night to urinate.     Urinalysis from 5/1/2024 revealing microscopic hematuria with 3-5 RBCs per hpf.     - 9/2024 consult: Bladder scan  mL.  Continue Flomax.  Start finasteride.  Monitor PSA.  Microhematuria evaluation.    CT urogram revealing left upper pole nonobstructing kidney stones including a dominant 13 mm stone with additional smaller stones in a calyceal diverticulum distal to the larger stone.    -  10/2024 follow-up: Cystoscopy revealing 1+ enlarged prostate mainly with lateral lobe hypertrophy.  Heavily trabeculated bladder 3-4+.  Continue finasteride and tamsulosin.  He elects monitoring his kidney stones for the time being.        PAST MEDICAL HISTORY: Hyperlipidemia.  Diabetes.  Nephrolithiasis.     PAST SURGICAL HISTORY: Left ureteroscopy with laser lithotripsy 2021.  Total knee replacement.  Appendectomy 1961.  Cataract surgery     SOCIAL HISTORY:  and has 2 daughters.  No smoking or illicit drug use.  Social alcohol.  Retired and used to work in finance.     Reviewed past medical, surgical, family, and social history.  Reviewed med list and allergies.      REVIEW OF SYSTEMS:  Pertinent positives and negatives per HPI. A 10-point ROS was performed and is otherwise negative.       EXAM:  /76 (BP Location: Left arm, Patient Position: Sitting, Cuff Size: adult)   Pulse 69   Ht 6' (1.829 m)   Wt 205 lb (93 kg)   BMI 27.80 kg/m²     Physical Exam  Constitutional:       Appearance: He is well-developed.   HENT:      Head: Normocephalic.   Eyes:      General: No scleral icterus.  Cardiovascular:      Rate and Rhythm: Normal rate.   Pulmonary:      Effort: Pulmonary effort is normal.   Skin:     General: Skin is warm and dry.   Neurological:      Mental Status: He is alert and oriented to person, place, and time.   Psychiatric:         Mood and Affect: Mood normal.         Behavior: Behavior normal.       PATHOLOGY:  No results found.      LABS:      PSA Screen    Latest Ref Rng <=4.00 ng/mL   1/14/2016 2.0   9/15/2017 2.440   6/18/2018 3.46   8/19/2019 3.86    1/13/2021 3.36    10/6/2022 4.35 (H)    5/1/2024 4.59 (H)       Legend:  (H) High      IMAGING:    CT UROGRAM (9/27/2024):     KIDNEYS:  The right kidney is normal.  There are multiple nonobstructing left renal calculi the largest calculus is in the left upper pole measuring 13 x 10 mm.  There are multiple additional 1.5-3 mm  nonobstructing calculi within a calyceal diverticulum  of the upper pole distal to the larger calculus.  The dilated left upper pole calyx measures approximately 21 x 16 mm.  There is an adjacent slightly prominent calyceal diverticulum more anterior laterally measuring 6 x 6 mm.  Normal enhancement bilaterally.  Minimal perinephric stranding on the left.  No obstruction.      CONCLUSION:  1. There are multiple nonobstructing left intrarenal calculi ranging in size from 1.5 mm up to 13 mm in the upper pole.  Distal to this 13 mm calculus there are distal calyceal diverticula measuring 21 mm and 6 mm.  No hydroureter.  Mild diffuse bladder wall thickening with a mildly enlarged prostate.  This bladder wall thickening most likely relates to conduct outlet obstruction with other etiologies less likely.  The right kidney is normal.  No mass lesion. 2. There are additional nonemergent incidental findings as described above.         UROLOGY PROCEDURE:    CYSTOURETHROSCOPY  Informed consent was obtained for the procedure. The site was prepped and draped in the usual sterile fashion. An Ambu 16 Urdu flexible cystoscope was utilized for the procedure.    Anesthesia:  2% lidocaine gel.    Urethra: Normal.    Prostate / Pelvic: Abnormal 1+ enlarged mainly with lateral lobe hypertrophy left >right. .    Bladder: No tumor, stone, or mass.  Heavily trabeculated with cellules.    U.O's: Normal.    Trabeculation: 3-4+.    POST CYSTOSCOPY MEDICATIONS: sample one tablet Cipro 500 mg given to patient.    DIAGNOSIS: Mild prostate enlargement with evidence of bladder outlet obstruction/heavily trabeculated bladder.       IMPRESSION:  83 year old male with microscopic hematuria, nephrolithiasis, BPH with LUTS.  Mildly elevated PSA.    Microhematuria evaluation completed including CT urogram and cystoscopy.    CT urogram revealing multiple nonobstructing left kidney stones with a dominant stone measuring about 13 mm in the upper pole  calyx.    Cystoscopy revealing mild prostate enlargement with a heavily trabeculated bladder.    Following cystoscopy I had a clinically significant, clearly identifiable, and separate discussion with the patient regarding further management of his BPH with LUTS and nephrolithiasis.    Kidney stone management options discussed including watchful waiting, ureteroscopy with laser lithotripsy, ESWL with or without restenting.  Reviewed rationale, approach, benefits, risks, possible complications, and reasonable alternatives.  Patient would like to pursue conservative management at this point.    Regarding his BPH with LUTS: Currently on maximal medical therapy with finasteride and tamsulosin.  He has been on finasteride for about 4 weeks now.  Discussed continuing maximal medical therapy and reevaluating in 2 to 3 months.  If he has persistently elevated PVRs and bothersome LUTS, then he may consider minimally invasive surgical treatment options for his BPH such as UroLift.    All questions answered.  Patient agreeable.      PLAN:  1.  Electing conservative management of left kidney stones for the time being.    2.  Continue finasteride and tamsulosin for management of BPH with LUTS.    Follow-up in 3 months for an updated IPSS score and bladder scan/PVR.  Patient will consider minimally invasive surgical treatment options for his BPH.      Donavon Campbell MD  10/16/2024

## 2024-10-16 NOTE — TELEPHONE ENCOUNTER
Spoke with Dr. Hendrix's office to fax the official pre-op request to our office.     Appt made for next SDA on 11/1/24. Patient aware and confirmed.

## 2024-10-17 ENCOUNTER — OFFICE VISIT (OUTPATIENT)
Facility: LOCATION | Age: 84
End: 2024-10-17
Payer: MEDICARE

## 2024-10-17 DIAGNOSIS — J32.4 CHRONIC PANSINUSITIS: Primary | ICD-10-CM

## 2024-10-17 PROCEDURE — 31231 NASAL ENDOSCOPY DX: CPT | Performed by: OTOLARYNGOLOGY

## 2024-10-17 PROCEDURE — 99203 OFFICE O/P NEW LOW 30 MIN: CPT | Performed by: OTOLARYNGOLOGY

## 2024-10-17 PROCEDURE — 1160F RVW MEDS BY RX/DR IN RCRD: CPT | Performed by: OTOLARYNGOLOGY

## 2024-10-17 PROCEDURE — 1159F MED LIST DOCD IN RCRD: CPT | Performed by: OTOLARYNGOLOGY

## 2024-10-17 NOTE — PROGRESS NOTES
Chuy Dietz is a 83 year old male.   Chief Complaint   Patient presents with    Sinus Problem     HPI:   He has chronic sinusitis.  He is status post a sinus surgery at age 19 then at age 29 back in [place].  He gets chronic congestion headaches.  They have worsened over the past many months.  He denies allergies.  Current Outpatient Medications   Medication Sig Dispense Refill    finasteride 5 MG Oral Tab Take 1 tablet (5 mg total) by mouth daily. 90 tablet 1    metFORMIN 500 MG Oral Tab Take 1 tablet (500 mg total) by mouth daily with breakfast. 90 tablet 1    rosuvastatin 5 MG Oral Tab Take 1 tablet (5 mg total) by mouth nightly. 90 tablet 1    tamsulosin (FLOMAX) cap 1 capsule (0.4 mg total) daily.      aspirin 81 MG Oral Tab Take 1 tablet (81 mg total) by mouth daily.      Multiple Vitamins-Minerals (SUPER HIGH VITAMINS/MINERALS) Oral Tab Take 1 tablet by mouth daily.        Past Medical History:    Asthma (HCC)    Calculus of kidney    Diabetes (HCC)    Disorder of liver    fatty liver per problem list    High cholesterol    Mild intermittent asthma (HCC)    Mild intermittent asthma without complication (HCC)    Obesity (BMI 30.0-34.9)    Short of breath on exertion      Social History:  Social History     Socioeconomic History    Marital status:    Tobacco Use    Smoking status: Never    Smokeless tobacco: Never   Vaping Use    Vaping status: Never Used   Substance and Sexual Activity    Alcohol use: Yes     Alcohol/week: 0.0 standard drinks of alcohol     Comment: 1 Lopez Lite at dinner    Drug use: No   Other Topics Concern    Caffeine Concern Yes     Comment: 2 cups of coffee a day    Exercise Yes     Comment: daily    Seat Belt Yes      Past Surgical History:   Procedure Laterality Date    Appendectomy  1961    Colonoscopy N/A 7/12/2022    Procedure: COLONOSCOPY;  Surgeon: Castro Lorenzana MD;  Location:  ENDOSCOPY    Colonoscopy N/A 8/22/2022    Procedure: COLONOSCOPY with cold snare  polypectomy;  Surgeon: Castro Lorenzana MD;  Location:  ENDOSCOPY    Colonoscopy,biopsy N/A 5/14/2016    Procedure: COLONOSCOPY, POSSIBLE BIOPSY, POSSIBLE POLYPECTOMY 80043;  Surgeon: Castro Lorenzana MD;  Location: [facility]    Colonoscopy,remv lesn,snare N/A 5/14/2016    Procedure: COLONOSCOPY, POSSIBLE BIOPSY, POSSIBLE POLYPECTOMY 51143;  Surgeon: Castro Lorenzana MD;  Location: [facility]    Knee replacement surgery  2/1/2013    Right TKR by Dr. Camp    Other surgical history  1986    right knee surgery/lig repair    Other surgical history  03/24/2021    Cystoscopy/Stent removal-    Remv cataract extracap,insert lens Left 12/14/2015    Procedure: LEFT PHACOEMULSIFICATION OF CATARACT WITH INTRAOCULAR LENS IMPLANT [id number];  Surgeon: Oziel Rivas MD;  Location: [facility]    Remv cataract extracap,insert lens Right 1/4/2016    Procedure: RIGHT PHACOEMULSIFICATION OF CATARACT WITH INTRAOCULAR LENS IMPLANT [id number];  Surgeon: Oziel Rivas MD;  Location: [facility]         REVIEW OF SYSTEMS:   GENERAL HEALTH: feels well otherwise  GENERAL : denies fever, chills, sweats, weight loss, weight gain  SKIN: denies any unusual skin lesions or rashes  RESPIRATORY: denies shortness of breath with exertion  NEURO: denies headaches    EXAM:   There were no vitals taken for this visit.    System Findings Details   Constitutional  Overall appearance - Normal.   Psychiatric  Orientation - Oriented to time, place, person & situation. Appropriate mood and affect.   Head/Face  Facial features -- Normal. Skull - Normal.   Eyes  Pupils equal ,round ,react to light and accomidate   Ears, Nose, Throat, Neck  Ears clear nose congestion oropharynx cobblestoning neck no masses   Neurological  Memory - Normal. Cranial nerves - Cranial nerves II through XII grossly intact.   Lymph Detail  Submental. Submandibular. Anterior cervical. Posterior cervical. Supraclavicular.      Procedure:  Due to inability for adequate examination of the nasal cavity and nasopharynx and need for magnification to perform the examination, endoscopy was offered.  The nasal endoscope was utilized as it was imperative to inspect the interior of the nasal cavity and the middle and superior meatus along with the turbinates and sphenoethmoid recess.  Risks and benefits were discussed with patient/family and they have given consent to proceed. A rigid zero degree scope was inserted.    Findings:  The anterior of the nasal cavity along with the middle and superior meatus and turbinates and the sphenoethmoid recess were evaluated.  There is changes of sinus surgery on the right.  This looks like more of a maxillary antrostomy but difficult to tell.  There is some erythema and swelling.  There is no polyps.  There is mild septal deviation to the left.  ASSESSMENT AND PLAN:   1. Chronic pansinusitis  Said chronic sinus problems for years status post sinus surgery x 2 the last time being at age 29 in [place].  He is now having worsening headaches.  He will undergo a dedicated CT scan of the sinuses then see me back.  - CT SINUS [facility] (CPT=70486); Future      The patient indicates understanding of these issues and agrees to the plan.    No follow-ups on file.    Vinay Shaw MD  10/17/2024  11:16 AM

## 2024-10-24 ENCOUNTER — HOSPITAL ENCOUNTER (OUTPATIENT)
Dept: CT IMAGING | Facility: HOSPITAL | Age: 84
Discharge: HOME | End: 2024-10-24
Attending: OTOLARYNGOLOGY
Payer: MEDICARE

## 2024-10-24 DIAGNOSIS — J32.4 CHRONIC PANSINUSITIS: ICD-10-CM

## 2024-10-24 PROCEDURE — 70486 CT MAXILLOFACIAL W/O DYE: CPT | Performed by: OTOLARYNGOLOGY

## 2024-10-26 ENCOUNTER — TELEPHONE (OUTPATIENT)
Dept: PULMONOLOGY | Facility: CLINIC | Age: 84
End: 2024-10-26

## 2024-10-28 ENCOUNTER — PATIENT MESSAGE (OUTPATIENT)
Dept: SURGERY | Facility: CLINIC | Age: 84
End: 2024-10-28

## 2024-10-28 DIAGNOSIS — R35.0 URINARY FREQUENCY: Primary | ICD-10-CM

## 2024-10-29 ENCOUNTER — LAB ENCOUNTER (OUTPATIENT)
Dept: LAB | Age: 84
End: 2024-10-29
Attending: FAMILY MEDICINE
Payer: MEDICARE

## 2024-10-29 ENCOUNTER — TELEPHONE (OUTPATIENT)
Dept: FAMILY MEDICINE CLINIC | Facility: CLINIC | Age: 84
End: 2024-10-29

## 2024-10-29 DIAGNOSIS — E11.9 DIABETES MELLITUS TYPE 2 WITHOUT RETINOPATHY (HCC): ICD-10-CM

## 2024-10-29 DIAGNOSIS — E78.5 HYPERLIPIDEMIA, UNSPECIFIED HYPERLIPIDEMIA TYPE: ICD-10-CM

## 2024-10-29 LAB
ALBUMIN SERPL-MCNC: 4.3 G/DL (ref 3.2–4.8)
ALBUMIN/GLOB SERPL: 1.5 {RATIO} (ref 1–2)
ALP LIVER SERPL-CCNC: 55 U/L
ALT SERPL-CCNC: 14 U/L
ANION GAP SERPL CALC-SCNC: 9 MMOL/L (ref 0–18)
AST SERPL-CCNC: 19 U/L (ref ?–34)
BILIRUB SERPL-MCNC: 0.7 MG/DL (ref 0.2–1.1)
BUN BLD-MCNC: 31 MG/DL (ref 9–23)
CALCIUM BLD-MCNC: 10.1 MG/DL (ref 8.7–10.4)
CHLORIDE SERPL-SCNC: 106 MMOL/L (ref 98–112)
CHOLEST SERPL-MCNC: 130 MG/DL (ref ?–200)
CO2 SERPL-SCNC: 25 MMOL/L (ref 21–32)
CREAT BLD-MCNC: 1.57 MG/DL
CREAT UR-SCNC: 59.4 MG/DL
EGFRCR SERPLBLD CKD-EPI 2021: 44 ML/MIN/1.73M2 (ref 60–?)
EST. AVERAGE GLUCOSE BLD GHB EST-MCNC: 160 MG/DL (ref 68–126)
FASTING PATIENT LIPID ANSWER: YES
FASTING STATUS PATIENT QL REPORTED: YES
GLOBULIN PLAS-MCNC: 2.9 G/DL (ref 2–3.5)
GLUCOSE BLD-MCNC: 150 MG/DL (ref 70–99)
HBA1C MFR BLD: 7.2 % (ref ?–5.7)
HDLC SERPL-MCNC: 36 MG/DL (ref 40–59)
LDLC SERPL CALC-MCNC: 76 MG/DL (ref ?–100)
MICROALBUMIN UR-MCNC: 0.7 MG/DL
MICROALBUMIN/CREAT 24H UR-RTO: 11.8 UG/MG (ref ?–30)
NONHDLC SERPL-MCNC: 94 MG/DL (ref ?–130)
OSMOLALITY SERPL CALC.SUM OF ELEC: 299 MOSM/KG (ref 275–295)
POTASSIUM SERPL-SCNC: 4.2 MMOL/L (ref 3.5–5.1)
PROT SERPL-MCNC: 7.2 G/DL (ref 5.7–8.2)
SODIUM SERPL-SCNC: 140 MMOL/L (ref 136–145)
TRIGL SERPL-MCNC: 97 MG/DL (ref 30–149)
VLDLC SERPL CALC-MCNC: 15 MG/DL (ref 0–30)

## 2024-10-29 PROCEDURE — 83036 HEMOGLOBIN GLYCOSYLATED A1C: CPT

## 2024-10-29 PROCEDURE — 80061 LIPID PANEL: CPT

## 2024-10-29 PROCEDURE — 82570 ASSAY OF URINE CREATININE: CPT

## 2024-10-29 PROCEDURE — 82043 UR ALBUMIN QUANTITATIVE: CPT

## 2024-10-29 PROCEDURE — 80053 COMPREHEN METABOLIC PANEL: CPT

## 2024-10-29 PROCEDURE — 36415 COLL VENOUS BLD VENIPUNCTURE: CPT

## 2024-10-29 NOTE — TELEPHONE ENCOUNTER
Patient had blood work this morning. He walked into the office to see if he could have a blood test to see if he had COVID antibodies. He was not feeling well a few weeks ago and wanted to know if he had COVID. I spoke to Dr. Maria and she said we could order a Sars COV-2-IGG AB nucleocapsid test but the patient would need to check with his insurance to make sure it is covered. I relayed the information to the patient. We discussed if it shows he has antibodies it will still not determine if it is from an acute or more chronic infection. Patient stated he recently had a COVID vaccine. After further discussion the patient decided it would not change anything if he did have COVID. He decided no further action is needed.

## 2024-10-30 ENCOUNTER — OFFICE VISIT (OUTPATIENT)
Facility: LOCATION | Age: 84
End: 2024-10-30
Payer: MEDICARE

## 2024-10-30 DIAGNOSIS — J32.4 CHRONIC PANSINUSITIS: Primary | ICD-10-CM

## 2024-10-30 PROCEDURE — 1159F MED LIST DOCD IN RCRD: CPT | Performed by: OTOLARYNGOLOGY

## 2024-10-30 PROCEDURE — 99214 OFFICE O/P EST MOD 30 MIN: CPT | Performed by: OTOLARYNGOLOGY

## 2024-10-30 PROCEDURE — 1160F RVW MEDS BY RX/DR IN RCRD: CPT | Performed by: OTOLARYNGOLOGY

## 2024-10-30 RX ORDER — CEFUROXIME AXETIL 250 MG/1
250 TABLET ORAL 2 TIMES DAILY
Qty: 30 TABLET | Refills: 0 | Status: SHIPPED | OUTPATIENT
Start: 2024-10-30

## 2024-10-30 RX ORDER — FLUTICASONE PROPIONATE 50 MCG
2 SPRAY, SUSPENSION (ML) NASAL DAILY
Qty: 16 G | Refills: 3 | Status: SHIPPED | OUTPATIENT
Start: 2024-10-30

## 2024-10-30 NOTE — PROGRESS NOTES
Chuy Dietz is a 83 year old male. No chief complaint on file.    HPI:   He is status post some type of sinus procedures when he was young in [place] many years ago.  He has had chronic sinus congestion pain pressure and headaches.    REVIEW OF SYSTEMS:   GENERAL HEALTH: feels well otherwise  GENERAL : denies fever, chills, sweats, weight loss, weight gain  SKIN: denies any unusual skin lesions or rashes  RESPIRATORY: denies shortness of breath with exertion  NEURO: denies headaches    EXAM:   There were no vitals taken for this visit.    System Findings Details   Constitutional  Overall appearance - Normal.   Psychiatric  Orientation - Oriented to time, place, person & situation. Appropriate mood and affect.   Head/Face  Facial features -- Normal. Skull - Normal.   Eyes  Pupils equal ,round ,react to light and accomidate   Ears, Nose, Throat, Neck  Ears clear nose erythema and congestion oropharynx cobblestoning   Neurological  Memory - Normal. Cranial nerves - Cranial nerves II through XII grossly intact.   Lymph Detail  Submental. Submandibular. Anterior cervical. Posterior cervical. Supraclavicular.       ASSESSMENT AND PLAN:   1. Chronic pansinusitis  CT of the sinus shows significant pansinusitis including the frontal ethmoid and maxillary sinuses with almost complete opacification of the right maxillary sinus.  I will treat him with a longer course of Ceftin.  He will use saline and Flonase.  He would then see me back in follow-up.      The patient indicates understanding of these issues and agrees to the plan.    No follow-ups on file.    Vinay Shaw MD  10/30/2024  12:43 PM

## 2024-11-01 ENCOUNTER — OFFICE VISIT (OUTPATIENT)
Dept: FAMILY MEDICINE CLINIC | Facility: CLINIC | Age: 84
End: 2024-11-01
Payer: MEDICARE

## 2024-11-01 VITALS
HEART RATE: 60 BPM | TEMPERATURE: 98 F | DIASTOLIC BLOOD PRESSURE: 72 MMHG | HEIGHT: 72 IN | RESPIRATION RATE: 16 BRPM | SYSTOLIC BLOOD PRESSURE: 128 MMHG | WEIGHT: 208.38 LBS | BODY MASS INDEX: 28.22 KG/M2

## 2024-11-01 DIAGNOSIS — I25.10 CORONARY ARTERY CALCIFICATION SEEN ON CT SCAN: ICD-10-CM

## 2024-11-01 DIAGNOSIS — E78.5 HYPERLIPIDEMIA, UNSPECIFIED HYPERLIPIDEMIA TYPE: ICD-10-CM

## 2024-11-01 DIAGNOSIS — I65.23 BILATERAL CAROTID ARTERY STENOSIS: ICD-10-CM

## 2024-11-01 DIAGNOSIS — I25.10 ATHEROSCLEROSIS OF NATIVE CORONARY ARTERY OF NATIVE HEART WITHOUT ANGINA PECTORIS: ICD-10-CM

## 2024-11-01 DIAGNOSIS — J32.0 CHRONIC MAXILLARY SINUSITIS: ICD-10-CM

## 2024-11-01 DIAGNOSIS — I70.0 ATHEROSCLEROSIS OF ABDOMINAL AORTA (HCC): ICD-10-CM

## 2024-11-01 DIAGNOSIS — Z01.818 PREOP TESTING: Primary | ICD-10-CM

## 2024-11-01 DIAGNOSIS — E11.9 DIABETES MELLITUS TYPE 2 WITHOUT RETINOPATHY (HCC): ICD-10-CM

## 2024-11-01 DIAGNOSIS — J45.20 MILD INTERMITTENT ASTHMA, UNSPECIFIED WHETHER COMPLICATED (HCC): ICD-10-CM

## 2024-11-01 PROBLEM — N18.30 CKD (CHRONIC KIDNEY DISEASE) STAGE 3, GFR 30-59 ML/MIN (HCC): Chronic | Status: ACTIVE | Noted: 2024-11-01

## 2024-11-01 PROBLEM — N18.30 CKD (CHRONIC KIDNEY DISEASE) STAGE 3, GFR 30-59 ML/MIN (HCC): Chronic | Status: RESOLVED | Noted: 2024-11-01 | Resolved: 2024-11-01

## 2024-11-01 LAB
ATRIAL RATE: 62 BPM
P AXIS: 62 DEGREES
P-R INTERVAL: 160 MS
Q-T INTERVAL: 438 MS
QRS DURATION: 90 MS
QTC CALCULATION (BEZET): 444 MS
R AXIS: -45 DEGREES
T AXIS: 69 DEGREES
VENTRICULAR RATE: 62 BPM

## 2024-11-01 PROCEDURE — 99214 OFFICE O/P EST MOD 30 MIN: CPT | Performed by: FAMILY MEDICINE

## 2024-11-01 PROCEDURE — 3074F SYST BP LT 130 MM HG: CPT | Performed by: FAMILY MEDICINE

## 2024-11-01 PROCEDURE — 1159F MED LIST DOCD IN RCRD: CPT | Performed by: FAMILY MEDICINE

## 2024-11-01 PROCEDURE — 93000 ELECTROCARDIOGRAM COMPLETE: CPT | Performed by: FAMILY MEDICINE

## 2024-11-01 PROCEDURE — 1160F RVW MEDS BY RX/DR IN RCRD: CPT | Performed by: FAMILY MEDICINE

## 2024-11-01 PROCEDURE — 3008F BODY MASS INDEX DOCD: CPT | Performed by: FAMILY MEDICINE

## 2024-11-01 PROCEDURE — 3078F DIAST BP <80 MM HG: CPT | Performed by: FAMILY MEDICINE

## 2024-11-01 NOTE — PROGRESS NOTES
Chuy Dietz is a 84 year old male who presents for a pre-operative physical exam. Patient is to have robotic assisted bilateral inguinal hernia repair at [facility] on November 13, 2024 by Dr.Jihad Hendrix.     HPI:   Pt is here for preop evaluation requested by the surgeon due to patient history of diabetes,  hypercholesterolemia, coronary/carotid arthrosclerosis, chronic maxillary sinusitis, bilateral carotid stenosis ,asthma.    Diabetes is suboptimally controlled, hemoglobin A1c 7.2,  patient is taking Metformin.  Monitor.  He will hold metformin prior to his surgery    Hypercholesterolemia on rosuvastatin doing well and stable    Coronary arthrosclerosis/carotid atherosclerosis-stable on statin,  patient is asymptomatic.    Asthma patient does not use any inhalers , doing well stable,  asymptomatic.    Chronic maxillary sinusitis patient seeing ENT doctor started on antibiotics , continue present management.     Patient denies any problem with anesthesia with his prior surgeries.    Patient denies any chest pain, no  shortness of breath, no palpitations.  No upper respiratory infection symptoms.    Current Outpatient Medications   Medication Sig Dispense Refill    cefuroxime 250 MG Oral Tab Take 1 tablet (250 mg total) by mouth 2 (two) times daily. 30 tablet 0    fluticasone propionate 50 MCG/ACT Nasal Suspension 2 sprays by Nasal route daily. 16 g 3    finasteride 5 MG Oral Tab Take 1 tablet (5 mg total) by mouth daily. 90 tablet 1    metFORMIN 500 MG Oral Tab Take 1 tablet (500 mg total) by mouth daily with breakfast. 90 tablet 1    rosuvastatin 5 MG Oral Tab Take 1 tablet (5 mg total) by mouth nightly. 90 tablet 1    tamsulosin (FLOMAX) cap 1 capsule (0.4 mg total) daily.      aspirin 81 MG Oral Tab Take 1 tablet (81 mg total) by mouth daily.      Multiple Vitamins-Minerals (SUPER HIGH VITAMINS/MINERALS) Oral Tab Take 1 tablet by mouth daily.        Allergies: No Known Allergies   Past  Medical History:    Asthma (HCC)    Calculus of kidney    Diabetes (HCC)    Disorder of liver    fatty liver per problem list    High cholesterol    Mild intermittent asthma (HCC)    Mild intermittent asthma without complication (HCC)    Obesity (BMI 30.0-34.9)    Short of breath on exertion      Past Surgical History:   Procedure Laterality Date    Appendectomy  1961    Colonoscopy N/A 7/12/2022    Procedure: COLONOSCOPY;  Surgeon: Castro Lorenzana MD;  Location:  ENDOSCOPY    Colonoscopy N/A 8/22/2022    Procedure: COLONOSCOPY with cold snare polypectomy;  Surgeon: Castro Lorenzana MD;  Location:  ENDOSCOPY    Colonoscopy,biopsy N/A 5/14/2016    Procedure: COLONOSCOPY, POSSIBLE BIOPSY, POSSIBLE POLYPECTOMY 54212;  Surgeon: Castro Lorenzana MD;  Location: [facility]    Colonoscopy,remv lesn,snare N/A 5/14/2016    Procedure: COLONOSCOPY, POSSIBLE BIOPSY, POSSIBLE POLYPECTOMY 76141;  Surgeon: Castro Lorenzana MD;  Location: [facility]    Knee replacement surgery  2/1/2013    Right TKR by Dr. Capm    Other surgical history  1986    right knee surgery/lig repair    Other surgical history  03/24/2021    Cystoscopy/Stent removal-    Remv cataract extracap,insert lens Left 12/14/2015    Procedure: LEFT PHACOEMULSIFICATION OF CATARACT WITH INTRAOCULAR LENS IMPLANT [id number];  Surgeon: Oziel Rivas MD;  Location: [facility]    Remv cataract extracap,insert lens Right 1/4/2016    Procedure: RIGHT PHACOEMULSIFICATION OF CATARACT WITH INTRAOCULAR LENS IMPLANT [id number];  Surgeon: Oziel Rivas MD;  Location: [facility]      Family History   Problem Relation Age of Onset    Cancer Father         lung ca, smoker    Other (Other) Mother         thyroid problem, tracheostomy,       Social History:   Social History     Socioeconomic History    Marital status:    Tobacco Use    Smoking status: Never    Smokeless tobacco: Never   Vaping Use    Vaping  status: Never Used   Substance and Sexual Activity    Alcohol use: Yes     Alcohol/week: 0.0 standard drinks of alcohol     Comment: 1 Lopez Lite at dinner    Drug use: No   Other Topics Concern    Caffeine Concern Yes     Comment: 2 cups of coffee a day    Exercise Yes     Comment: daily    Seat Belt Yes         REVIEW OF SYSTEMS:   GENERAL: feels well otherwise  SKIN: denies any unusual skin lesions  EYES:denies blurred vision or double vision  HEENT: chronic nasal congestion, sinus pain or ST  LUNGS: denies shortness of breath with exertion  CARDIOVASCULAR: denies chest pain on exertion  GI: denies abdominal pain,denies heartburn  : denies dysuria,  MUSCULOSKELETAL: denies back pain  NEURO: denies headaches  PSYCHE: denies depression or anxiety  HEMATOLOGIC: denies hx of anemia  ENDOCRINE: denies thyroid history  ALL/ASTHMA: denies hx of allergy or asthma    EXAM:   /72   Pulse 60   Temp 97.7 °F (36.5 °C) (Temporal)   Resp 16   Ht 6' (1.829 m)   Wt 208 lb 6.4 oz (94.5 kg)   BMI 28.26 kg/m²   GENERAL: well developed, well nourished,in no apparent distress  SKIN: no rashes,no suspicious lesions  HEENT: atraumatic, normocephalic,ears and throat are clear  EYES:PERRLA, EOMI, conjunctiva are clear  NECK: supple,no adenopathy,  CHEST: no chest tenderness  BREAST  deferred  LUNGS: clear to auscultation  CARDIO: RRR without murmur  GI: good BS's,no masses, HSM or tenderness  : deferred  RECTAL: Deferred  MUSCULOSKELETAL: back is not tender,FROM of the back  EXTREMITIES: no cyanosis, clubbing or edema  NEURO: Oriented times three,cranial nerves are intact,motor and sensory are grossly intact          Results for orders placed or performed in visit on 11/01/24   EKG with interpretation and Report -IN OFFICE [[id number]]    Collection Time: 11/01/24  1:30 PM   Result Value Ref Range    Ventricular rate 62 BPM    Atrial rate 62 BPM    P-R Interval 160 ms    QRS Duration 90 ms    Q-T Interval 438 ms    QTC  Calculation (Bezet) 444 ms    P Axis 62 degrees    R Axis -45 degrees    T Axis 69 degrees     Sinus rhythm with Premature atrial complexes  Left anterior fascicular block  Abnormal ECG  When compared with ECG of 21-JAN-2013 10:08,  No significant change was found  Confirmed by Corinne Maria ([id number]) on 11/1/2024 7:43:10 PM     Specimen Collected: 11/01/24  1:30 PM Last Resulted: 11/01/24  7:43 PM     CBC done 11/5/2024 normal    CMP done 11/5/2024 blood sugar 137, creatinine mildly elevated 1.35, GFR 52 slightly lower,  otherwise unremarkable.    ASSESSMENT AND PLAN:   Chuy Dietz is a 84 year old male who presents for a pre-operative physical exam.   Encounter Diagnoses   Name Primary?    Preop testing Yes    Diabetes mellitus type 2 without retinopathy (HCC)     Hyperlipidemia, unspecified hyperlipidemia type     Atherosclerosis of abdominal aorta (HCC)     Coronary artery calcification seen on CT scan     Mild intermittent asthma, unspecified whether complicated (HCC)     Atherosclerosis of native coronary artery of native heart without angina pectoris     Bilateral carotid artery stenosis     Chronic maxillary sinusitis        Patient is in a satisfactory condition and acceptable risk for planned surgery and anesthesia.    Orders Placed This Encounter   Procedures    CBC W Differential W Platelet [E]    Comp Metabolic Panel (14) [E]       Meds & Refills for this Visit:  Requested Prescriptions      No prescriptions requested or ordered in this encounter     Stop Metformin 24 hours before surgery and restart 48 hours after surgery.  Continue current meds.   Keep good hydration.   DO NOT take Aspirin, Advil, Ibuprofen, Aleve , Motrin for 1 week prior surgery.    Take Tylenol  as needed for pain.    Imaging & Consults:  ELECTROCARDIOGRAM, COMPLETE      This consult was sent back the referring physician, Dr. Julianna Hendrix.     Thank you very much for consultation.    The note was dictated using speech  recognition software.  Accuracy and grammar in transcription may be subject to error.

## 2024-11-05 ENCOUNTER — LAB ENCOUNTER (OUTPATIENT)
Dept: LAB | Age: 84
End: 2024-11-05
Attending: FAMILY MEDICINE
Payer: MEDICARE

## 2024-11-05 DIAGNOSIS — Z01.818 PREOP TESTING: ICD-10-CM

## 2024-11-05 LAB
ALBUMIN SERPL-MCNC: 4.6 G/DL (ref 3.2–4.8)
ALBUMIN/GLOB SERPL: 1.8 {RATIO} (ref 1–2)
ALP LIVER SERPL-CCNC: 52 U/L
ALT SERPL-CCNC: 17 U/L
ANION GAP SERPL CALC-SCNC: 8 MMOL/L (ref 0–18)
AST SERPL-CCNC: 20 U/L (ref ?–34)
BASOPHILS # BLD AUTO: 0.04 X10(3) UL (ref 0–0.2)
BASOPHILS NFR BLD AUTO: 0.5 %
BILIRUB SERPL-MCNC: 0.6 MG/DL (ref 0.2–1.1)
BUN BLD-MCNC: 23 MG/DL (ref 9–23)
CALCIUM BLD-MCNC: 9.4 MG/DL (ref 8.7–10.4)
CHLORIDE SERPL-SCNC: 107 MMOL/L (ref 98–112)
CO2 SERPL-SCNC: 25 MMOL/L (ref 21–32)
CREAT BLD-MCNC: 1.35 MG/DL
EGFRCR SERPLBLD CKD-EPI 2021: 52 ML/MIN/1.73M2 (ref 60–?)
EOSINOPHIL # BLD AUTO: 0.26 X10(3) UL (ref 0–0.7)
EOSINOPHIL NFR BLD AUTO: 3 %
ERYTHROCYTE [DISTWIDTH] IN BLOOD BY AUTOMATED COUNT: 11.8 %
FASTING STATUS PATIENT QL REPORTED: YES
GLOBULIN PLAS-MCNC: 2.6 G/DL (ref 2–3.5)
GLUCOSE BLD-MCNC: 137 MG/DL (ref 70–99)
HCT VFR BLD AUTO: 41.1 %
HGB BLD-MCNC: 14.3 G/DL
IMM GRANULOCYTES # BLD AUTO: 0.03 X10(3) UL (ref 0–1)
IMM GRANULOCYTES NFR BLD: 0.3 %
LYMPHOCYTES # BLD AUTO: 1.68 X10(3) UL (ref 1–4)
LYMPHOCYTES NFR BLD AUTO: 19.3 %
MCH RBC QN AUTO: 32.3 PG (ref 26–34)
MCHC RBC AUTO-ENTMCNC: 34.8 G/DL (ref 31–37)
MCV RBC AUTO: 92.8 FL
MONOCYTES # BLD AUTO: 0.64 X10(3) UL (ref 0.1–1)
MONOCYTES NFR BLD AUTO: 7.3 %
NEUTROPHILS # BLD AUTO: 6.06 X10 (3) UL (ref 1.5–7.7)
NEUTROPHILS # BLD AUTO: 6.06 X10(3) UL (ref 1.5–7.7)
NEUTROPHILS NFR BLD AUTO: 69.6 %
OSMOLALITY SERPL CALC.SUM OF ELEC: 296 MOSM/KG (ref 275–295)
PLATELET # BLD AUTO: 279 10(3)UL (ref 150–450)
POTASSIUM SERPL-SCNC: 4 MMOL/L (ref 3.5–5.1)
PROT SERPL-MCNC: 7.2 G/DL (ref 5.7–8.2)
RBC # BLD AUTO: 4.43 X10(6)UL
SODIUM SERPL-SCNC: 140 MMOL/L (ref 136–145)
WBC # BLD AUTO: 8.7 X10(3) UL (ref 4–11)

## 2024-11-05 PROCEDURE — 85025 COMPLETE CBC W/AUTO DIFF WBC: CPT

## 2024-11-05 PROCEDURE — 80053 COMPREHEN METABOLIC PANEL: CPT

## 2024-11-05 PROCEDURE — 36415 COLL VENOUS BLD VENIPUNCTURE: CPT

## 2024-11-14 RX ORDER — FLUTICASONE PROPIONATE 50 MCG
2 SPRAY, SUSPENSION (ML) NASAL DAILY
Qty: 16 G | Refills: 3 | OUTPATIENT
Start: 2024-11-14

## 2024-12-13 ENCOUNTER — PATIENT MESSAGE (OUTPATIENT)
Dept: FAMILY MEDICINE CLINIC | Facility: CLINIC | Age: 84
End: 2024-12-13

## 2024-12-17 ENCOUNTER — TELEPHONE (OUTPATIENT)
Dept: FAMILY MEDICINE CLINIC | Facility: CLINIC | Age: 84
End: 2024-12-17

## 2024-12-17 NOTE — TELEPHONE ENCOUNTER
Humaira from [employer] was on the phone line with the patient, together.     Patient reached out to insurance asking for a \"back-dated referral\" for a hernia repair surgery that was done on 11/13/24.     Patient was referred to general surgery, Dr. Julianna Hendrix, where he was then referred to [facility] in [place] for the hernia repair, performed on 11/13/24. Patient was billed for $21,000 dollars.     Patient called Dr. Hendrix's office, who informed patient he will need a referral for the surgical center. He was told to call his PCP for this.     Patient states the bill shows \"reason codes\" and \"claim codes\" listed [id number], same code multiple times with different prices.     Attempted to call Jesusita Crow at referral department. No answer. Left message to call back, and will message her.

## 2024-12-17 NOTE — TELEPHONE ENCOUNTER
Forwarded to Phong Crow.     Per Jesusita MENDOZA in referral department, received the following information:  \"Hello, I checked into this with my supervisor and this is part of the authorization that would have been obtained by the surgeons office.  The PCP would not obtain authorization for the facility.  The PCP would obtain a specialist referral from PCP to the Specialist, but after that it is all handled by the surgeons office.  When submitting for the surgery, part of the authorization process is to include the facility.  I am sorry but this is not an auth the PCP would be responsible for.  - Thank you!\"    Spoke to Jeane,  from Dr Hendrix's office, and informed her of the information above. This referral should be provided from the surgeon's office. Per Jeane, she will give this to their referral coordinator and surgery department, and reach out if they have any additional questions.     Tried calling patient to update him of this. No answer. Unable to leave voicemail, phone keeps ringing.

## 2024-12-17 NOTE — TELEPHONE ENCOUNTER
Please give this message also to Brook to handle since I do not do any insurance related concerns and issues.Thanks.

## 2024-12-18 NOTE — TELEPHONE ENCOUNTER
Deirdre, surgery scheduler called and wanted to get more information and discussed details below.    Per Deirdre on their end, the surgery that was performed on 11/13/2024 was approved.  Reiterated Jesusita Crow's note below and Deirdre voiced understanding.    Per Deirdre, she will go ahead and will call patient to discuss further and will forward any information to their referral coordinator.

## 2024-12-19 ENCOUNTER — OFFICE VISIT (OUTPATIENT)
Facility: LOCATION | Age: 84
End: 2024-12-19
Payer: MEDICARE

## 2024-12-19 DIAGNOSIS — J32.4 CHRONIC PANSINUSITIS: Primary | ICD-10-CM

## 2024-12-19 PROCEDURE — 99213 OFFICE O/P EST LOW 20 MIN: CPT | Performed by: OTOLARYNGOLOGY

## 2024-12-19 PROCEDURE — 1159F MED LIST DOCD IN RCRD: CPT | Performed by: OTOLARYNGOLOGY

## 2024-12-19 PROCEDURE — 1160F RVW MEDS BY RX/DR IN RCRD: CPT | Performed by: OTOLARYNGOLOGY

## 2024-12-20 NOTE — PROGRESS NOTES
Chuy Dietz is a 84 year old male.   Chief Complaint   Patient presents with    Sinus Problem     HPI:   I treated with antibiotic and steroid.  Despite the above he has persistent symptoms.  He will get a headache when he coughs and also when he bends over.    REVIEW OF SYSTEMS:   GENERAL HEALTH: feels well otherwise  GENERAL : denies fever, chills, sweats, weight loss, weight gain  SKIN: denies any unusual skin lesions or rashes  RESPIRATORY: denies shortness of breath with exertion  NEURO: denies headaches    EXAM:   There were no vitals taken for this visit.    System Findings Details   Constitutional  Overall appearance - Normal.   Psychiatric  Orientation - Oriented to time, place, person & situation. Appropriate mood and affect.   Head/Face  Facial features -- Normal. Skull - Normal.   Eyes  Pupils equal ,round ,react to light and accomidate   Ears, Nose, Throat, Neck  Nose mild erythema oropharynx clear   Neurological  Memory - Normal. Cranial nerves - Cranial nerves II through XII grossly intact.   Lymph Detail  Submental. Submandibular. Anterior cervical. Posterior cervical. Supraclavicular.       ASSESSMENT AND PLAN:   1. Chronic pansinusitis  We discussed the CT scan findings of chronic sinusitis.  I instructed him that this is likely the cause of his headaches.  I cannot rule out a neurologic component and I have offered him an evaluation by a neurologist.  At present he would like to be conservative and not proceed with any further workup for his sinuses or surgical intervention or neurology evaluation.  He will let me know if symptoms worsen.      The patient indicates understanding of these issues and agrees to the plan.    No follow-ups on file.    Vinay Shaw MD  12/19/2024  6:36 PM

## 2024-12-21 ENCOUNTER — MED REC SCAN ONLY (OUTPATIENT)
Dept: FAMILY MEDICINE CLINIC | Facility: CLINIC | Age: 84
End: 2024-12-21

## 2024-12-24 RX ORDER — TAMSULOSIN HYDROCHLORIDE 0.4 MG/1
0.4 CAPSULE ORAL DAILY
Qty: 90 CAPSULE | Refills: 0 | Status: SHIPPED | OUTPATIENT
Start: 2024-12-24

## 2024-12-24 NOTE — TELEPHONE ENCOUNTER
- protocol met, refill approved  LOV 10/16/24  PLAN:  1.  Electing conservative management of left kidney stones for the time being.     2.  Continue finasteride and tamsulosin for management of BPH with LUTS.     Follow-up in 3 months for an updated IPSS score and bladder scan/PVR.  Patient will consider minimally invasive surgical treatment options for his BPH.        Donavon Campbell MD  10/16/2024

## 2025-01-14 ENCOUNTER — TELEPHONE (OUTPATIENT)
Dept: FAMILY MEDICINE CLINIC | Facility: CLINIC | Age: 85
End: 2025-01-14

## 2025-01-14 DIAGNOSIS — E11.9 DIABETES MELLITUS TYPE 2 WITHOUT RETINOPATHY (HCC): Primary | ICD-10-CM

## 2025-01-14 DIAGNOSIS — E78.5 HYPERLIPIDEMIA, UNSPECIFIED HYPERLIPIDEMIA TYPE: ICD-10-CM

## 2025-01-14 NOTE — TELEPHONE ENCOUNTER
Patient is requesting for annual labs to place for him to complete prior to his appointment on 1/31/25.    Thank you.

## 2025-01-20 ENCOUNTER — OFFICE VISIT (OUTPATIENT)
Dept: SURGERY | Facility: CLINIC | Age: 85
End: 2025-01-20
Payer: MEDICARE

## 2025-01-20 DIAGNOSIS — R33.9 INCOMPLETE BLADDER EMPTYING: ICD-10-CM

## 2025-01-20 DIAGNOSIS — N40.1 BPH WITH OBSTRUCTION/LOWER URINARY TRACT SYMPTOMS: Primary | ICD-10-CM

## 2025-01-20 DIAGNOSIS — N13.8 BPH WITH OBSTRUCTION/LOWER URINARY TRACT SYMPTOMS: Primary | ICD-10-CM

## 2025-01-20 DIAGNOSIS — N20.0 NEPHROLITHIASIS: ICD-10-CM

## 2025-01-20 PROCEDURE — 99214 OFFICE O/P EST MOD 30 MIN: CPT | Performed by: UROLOGY

## 2025-01-20 PROCEDURE — 1159F MED LIST DOCD IN RCRD: CPT | Performed by: UROLOGY

## 2025-01-20 NOTE — PROGRESS NOTES
[facility]  Follow-Up Visit    HPI: Chuy Dietz is a 84 year old male presents for a follow up visit. Patient was last seen on 10/16/2024.    INTERVAL HISTORY: Following up regarding BPH with LUTS and nephrolithiasis.    CT urogram completed 9/26/2024 revealing multiple left nonobstructing kidney stones in the upper pole with a dominant stone measuring 13 x 10 mm.  Additional smaller nonobstructing calculi within a calyceal diverticulum of the upper pole distal to the larger calculus.  No hydronephrosis.    He denies flank pain.    On maximal medical therapy for BPH with LUTS and incomplete bladder emptying with tamsulosin and finasteride.     He feels mild improvement in symptoms.  Still reports urinary urgency and weak stream.    His IPSS is 15 (1/2/1/4/4/0/3) with a QOL score of 3.    Bladder scan revealed PVR of 178 mL.    Mildly elevated PSA, age appropriate.  Decided to monitor.      1.  BPH with LUTS  2.  Elevated PSA  3.  Microscopic hematuria  4.  Nephrolithiasis  Patient with mildly elevated screening PSA levels since October 2022.  Levels have been largely stable, most recently 4.59 ng/mL from 5/1/2024.     History of nephrolithiasis.  He underwent left ureteroscopy with laser lithotripsy by Dr. Boykin 3/2021.  He passed a 4 mm stone in 2023.  Currently denies flank pain.     He has BPH with bothersome obstructive LUTS.  He has been on tamsulosin for a few years.       He reports an IPSS of 15 (1/1/1///1/3) with a quality-of-life score of 4.  He is mostly bothered by urinary urgency and weak stream.  He wakes up about 3 times at night to urinate.     Urinalysis from 5/1/2024 revealing microscopic hematuria with 3-5 RBCs per hpf.     - 9/2024 consult: Bladder scan  mL.  Continue Flomax.  Start finasteride.  Monitor PSA.  Microhematuria evaluation.    CT urogram revealing left upper pole nonobstructing kidney stones including a dominant 13 mm stone with  additional smaller stones in a calyceal diverticulum distal to the larger stone.    - 10/2024 follow-up: Cystoscopy revealing 1+ enlarged prostate mainly with lateral lobe hypertrophy.  Heavily trabeculated bladder 3-4+.  Continue finasteride and tamsulosin.  He elects monitoring his kidney stones for the time being.    - 1/2025 F/U: On Flomax and Proscar.  IPSS 15 (1/2/1/4/4/0/3) with a QOL score of 3.  Bladder scan  mL.  Would like to continue current management.  Asymptomatic from kidney stone standpoint and would like to continue conservative management as well.        PAST MEDICAL HISTORY: Hyperlipidemia.  Diabetes.  Nephrolithiasis.     PAST SURGICAL HISTORY: Left ureteroscopy with laser lithotripsy 2021.  Total knee replacement.  Appendectomy 1961.  Cataract surgery     SOCIAL HISTORY:  and has 2 daughters.  No smoking or illicit drug use.  Social alcohol.  Retired and used to work in finance.     Reviewed past medical, surgical, family, and social history.  Reviewed med list and allergies.      REVIEW OF SYSTEMS:  Pertinent positives and negatives per HPI. A 10-point ROS was performed and is otherwise negative.       EXAM:  There were no vitals taken for this visit.    Physical Exam  Constitutional:       Appearance: He is well-developed.   HENT:      Head: Normocephalic.   Eyes:      General: No scleral icterus.  Cardiovascular:      Rate and Rhythm: Normal rate.   Pulmonary:      Effort: Pulmonary effort is normal.   Skin:     General: Skin is warm and dry.   Neurological:      Mental Status: He is alert and oriented to person, place, and time.   Psychiatric:         Mood and Affect: Mood normal.         Behavior: Behavior normal.       PATHOLOGY:  No results found.      LABS:      PSA Screen    Latest Ref Rng <=4.00 ng/mL   1/14/2016 2.0   9/15/2017 2.440   6/18/2018 3.46   8/19/2019 3.86    1/13/2021 3.36    10/6/2022 4.35 (H)    5/1/2024 4.59 (H)       Legend:  (H) High      IMAGING:    CT  UROGRAM (9/27/2024):     KIDNEYS:  The right kidney is normal.  There are multiple nonobstructing left renal calculi the largest calculus is in the left upper pole measuring 13 x 10 mm.  There are multiple additional 1.5-3 mm nonobstructing calculi within a calyceal diverticulum  of the upper pole distal to the larger calculus.  The dilated left upper pole calyx measures approximately 21 x 16 mm.  There is an adjacent slightly prominent calyceal diverticulum more anterior laterally measuring 6 x 6 mm.  Normal enhancement bilaterally.  Minimal perinephric stranding on the left.  No obstruction.      CONCLUSION:  1. There are multiple nonobstructing left intrarenal calculi ranging in size from 1.5 mm up to 13 mm in the upper pole.  Distal to this 13 mm calculus there are distal calyceal diverticula measuring 21 mm and 6 mm.  No hydroureter.  Mild diffuse bladder wall thickening with a mildly enlarged prostate.  This bladder wall thickening most likely relates to conduct outlet obstruction with other etiologies less likely.  The right kidney is normal.  No mass lesion. 2. There are additional nonemergent incidental findings as described above.         UROLOGY PROCEDURE:  None performed today.      IMPRESSION:  84 year old male with microscopic hematuria, nephrolithiasis, BPH with LUTS.  Mildly elevated PSA.    Microhematuria evaluation completed including CT urogram and cystoscopy.    CT urogram revealing multiple nonobstructing left kidney stones with a dominant stone measuring about 13 mm in the upper pole calyx.    Cystoscopy revealing mild prostate enlargement with a heavily trabeculated bladder.    On maximal medical therapy for BPH with finasteride and tamsulosin daily.    Moderate persistent lower urinary tract symptoms per IPSS score.  Mildly elevated postvoid residual volumes.    Kidney stone management options discussed including watchful waiting, ureteroscopy with laser lithotripsy, ESWL with or without  restenting.  Reviewed rationale, approach, benefits, risks, possible complications, and reasonable alternatives.  Patient would like to pursue conservative management at this point.    Regarding his BPH with LUTS: Discussed options of continuing maximal medical therapy versus considering minimally invasive surgical treatment options for his BPH such as UroLift.  Benefits, risks, and alternatives of treatment were discussed.  Patient would like to continue current management for the time being.    All questions answered.  Patient agreeable.      PLAN:  1.  Electing conservative management of left kidney stones for the time being.  He will consider definitive management.    2.  Continue finasteride and tamsulosin for management of BPH with LUTS.    Follow-up in 6 months for an updated IPSS score and bladder scan/PVR.  Patient will consider minimally invasive surgical treatment options for his BPH.      Donavon Campbell MD  1/20/2025

## 2025-01-28 ENCOUNTER — LAB ENCOUNTER (OUTPATIENT)
Dept: LAB | Age: 85
End: 2025-01-28
Attending: FAMILY MEDICINE
Payer: MEDICARE

## 2025-01-28 DIAGNOSIS — E11.9 DIABETES MELLITUS TYPE 2 WITHOUT RETINOPATHY (HCC): ICD-10-CM

## 2025-01-28 DIAGNOSIS — E78.5 HYPERLIPIDEMIA, UNSPECIFIED HYPERLIPIDEMIA TYPE: ICD-10-CM

## 2025-01-28 DIAGNOSIS — M25.561 CHRONIC PAIN OF RIGHT KNEE: ICD-10-CM

## 2025-01-28 DIAGNOSIS — G89.29 CHRONIC PAIN OF RIGHT KNEE: ICD-10-CM

## 2025-01-28 LAB
BILIRUB UR QL STRIP.AUTO: NEGATIVE
CHOLEST SERPL-MCNC: 133 MG/DL (ref ?–200)
CLARITY UR REFRACT.AUTO: CLEAR
EST. AVERAGE GLUCOSE BLD GHB EST-MCNC: 137 MG/DL (ref 68–126)
FASTING PATIENT LIPID ANSWER: YES
GLUCOSE UR STRIP.AUTO-MCNC: NORMAL MG/DL
HBA1C MFR BLD: 6.4 % (ref ?–5.7)
HDLC SERPL-MCNC: 41 MG/DL (ref 40–59)
KETONES UR STRIP.AUTO-MCNC: NEGATIVE MG/DL
LDLC SERPL CALC-MCNC: 74 MG/DL (ref ?–100)
LEUKOCYTE ESTERASE UR QL STRIP.AUTO: 25
NITRITE UR QL STRIP.AUTO: NEGATIVE
NONHDLC SERPL-MCNC: 92 MG/DL (ref ?–130)
PH UR STRIP.AUTO: 5 [PH] (ref 5–8)
PROT UR STRIP.AUTO-MCNC: NEGATIVE MG/DL
RBC UR QL AUTO: NEGATIVE
SP GR UR STRIP.AUTO: 1.02 (ref 1–1.03)
T4 FREE SERPL-MCNC: 1.1 NG/DL (ref 0.8–1.7)
TRIGL SERPL-MCNC: 98 MG/DL (ref 30–149)
TSI SER-ACNC: 2.93 UIU/ML (ref 0.55–4.78)
UROBILINOGEN UR STRIP.AUTO-MCNC: NORMAL MG/DL
VLDLC SERPL CALC-MCNC: 15 MG/DL (ref 0–30)

## 2025-01-28 PROCEDURE — 87077 CULTURE AEROBIC IDENTIFY: CPT

## 2025-01-28 PROCEDURE — 80061 LIPID PANEL: CPT

## 2025-01-28 PROCEDURE — 87186 SC STD MICRODIL/AGAR DIL: CPT

## 2025-01-28 PROCEDURE — 84443 ASSAY THYROID STIM HORMONE: CPT

## 2025-01-28 PROCEDURE — 36415 COLL VENOUS BLD VENIPUNCTURE: CPT

## 2025-01-28 PROCEDURE — 81001 URINALYSIS AUTO W/SCOPE: CPT

## 2025-01-28 PROCEDURE — 87086 URINE CULTURE/COLONY COUNT: CPT

## 2025-01-28 PROCEDURE — 83036 HEMOGLOBIN GLYCOSYLATED A1C: CPT

## 2025-01-28 PROCEDURE — 84439 ASSAY OF FREE THYROXINE: CPT

## 2025-01-28 PROCEDURE — 83735 ASSAY OF MAGNESIUM: CPT

## 2025-01-29 RX ORDER — CIPROFLOXACIN 250 MG/1
250 TABLET, FILM COATED ORAL 2 TIMES DAILY
Qty: 20 TABLET | Refills: 0 | Status: SHIPPED | OUTPATIENT
Start: 2025-01-29 | End: 2025-02-08

## 2025-01-31 ENCOUNTER — OFFICE VISIT (OUTPATIENT)
Dept: FAMILY MEDICINE CLINIC | Facility: CLINIC | Age: 85
End: 2025-01-31
Payer: MEDICARE

## 2025-01-31 ENCOUNTER — HOSPITAL ENCOUNTER (OUTPATIENT)
Dept: GENERAL RADIOLOGY | Age: 85
Discharge: HOME | End: 2025-01-31
Attending: FAMILY MEDICINE
Payer: MEDICARE

## 2025-01-31 VITALS
HEIGHT: 72 IN | TEMPERATURE: 97 F | BODY MASS INDEX: 28.71 KG/M2 | HEART RATE: 68 BPM | WEIGHT: 212 LBS | RESPIRATION RATE: 16 BRPM | DIASTOLIC BLOOD PRESSURE: 72 MMHG | SYSTOLIC BLOOD PRESSURE: 138 MMHG

## 2025-01-31 DIAGNOSIS — Z01.89 ENCOUNTER FOR LOWER EXTREMITY COMPARISON IMAGING STUDY: ICD-10-CM

## 2025-01-31 DIAGNOSIS — J44.89 ASTHMA WITH COPD (CHRONIC OBSTRUCTIVE PULMONARY DISEASE) (HCC): ICD-10-CM

## 2025-01-31 DIAGNOSIS — I25.10 CORONARY ARTERY CALCIFICATION SEEN ON CT SCAN: ICD-10-CM

## 2025-01-31 DIAGNOSIS — I70.0 ATHEROSCLEROSIS OF ABDOMINAL AORTA: ICD-10-CM

## 2025-01-31 DIAGNOSIS — G89.29 CHRONIC PAIN OF RIGHT KNEE: ICD-10-CM

## 2025-01-31 DIAGNOSIS — I65.23 BILATERAL CAROTID ARTERY STENOSIS: ICD-10-CM

## 2025-01-31 DIAGNOSIS — N30.00 ACUTE CYSTITIS WITHOUT HEMATURIA: ICD-10-CM

## 2025-01-31 DIAGNOSIS — E11.9 DIABETES MELLITUS TYPE 2 WITHOUT RETINOPATHY (HCC): ICD-10-CM

## 2025-01-31 DIAGNOSIS — J45.20 MILD INTERMITTENT ASTHMA, UNSPECIFIED WHETHER COMPLICATED (HCC): ICD-10-CM

## 2025-01-31 DIAGNOSIS — E11.59 TYPE 2 DIABETES MELLITUS WITH OTHER CIRCULATORY COMPLICATIONS (HCC): ICD-10-CM

## 2025-01-31 DIAGNOSIS — E78.5 HYPERLIPIDEMIA, UNSPECIFIED HYPERLIPIDEMIA TYPE: ICD-10-CM

## 2025-01-31 DIAGNOSIS — R97.20 ELEVATED PSA MEASUREMENT: ICD-10-CM

## 2025-01-31 DIAGNOSIS — K76.0 FATTY LIVER: ICD-10-CM

## 2025-01-31 DIAGNOSIS — I25.10 ATHEROSCLEROSIS OF NATIVE CORONARY ARTERY OF NATIVE HEART WITHOUT ANGINA PECTORIS: ICD-10-CM

## 2025-01-31 DIAGNOSIS — M25.561 CHRONIC PAIN OF RIGHT KNEE: ICD-10-CM

## 2025-01-31 DIAGNOSIS — Z00.00 ENCOUNTER FOR ANNUAL HEALTH EXAMINATION: Primary | ICD-10-CM

## 2025-01-31 LAB — MAGNESIUM SERPL-MCNC: 1.9 MG/DL (ref 1.6–2.6)

## 2025-01-31 PROCEDURE — 73564 X-RAY EXAM KNEE 4 OR MORE: CPT | Performed by: FAMILY MEDICINE

## 2025-01-31 PROCEDURE — 73562 X-RAY EXAM OF KNEE 3: CPT | Performed by: FAMILY MEDICINE

## 2025-01-31 RX ORDER — ROSUVASTATIN CALCIUM 5 MG/1
5 TABLET, COATED ORAL NIGHTLY
Qty: 90 TABLET | Refills: 1 | Status: SHIPPED | OUTPATIENT
Start: 2025-01-31

## 2025-01-31 NOTE — PATIENT INSTRUCTIONS
Continue current meds.   Watch diet for fats and carbs.   Stay active.     will call you with magnesium results when those are back.  Finish course of antibiotic.  Recheck urine cultures 4 -5  days after finishing antibiotic.  Take Probiotic like Align  while on antibiotic.  Keep good hydration.  Do fasting blood work 1 week prior next visit in 6 months.  Do x-ray of your knee.  See orthopedic Dr. San for evaluation.      Refill policies:      Allow 3 business days for refills; controlled substances may take longer.  Contact your pharmacy at least 5-7 business days prior to running out of medication and have them send an electronic request or submit through the \"request refill\" option thru your [employer] account. No need to do both, as multiple requests will create an automated [employer] message to notify of a denial for one of the duplicated requests, causing you undue confusion.   Refills are NOT addressed on weekends; covering physicians do not authorize routine medications on weekends.  No narcotics or controlled substances are refilled after noon on Fridays or by on call physicians.  By law, narcotics cannot be faxed or phoned into your pharmacy.  If your prescription is due for a refill, you may be due for a follow up appointment. Please call our office at [phone] to make an appointment or schedule an appointment via [employer].  To best provide you care, patients receiving routine medications need to be seen at least twice a year. Patients receiving narcotic/controlled substance medications need to be seen at least once every 3 months.  In the event that your preferred pharmacy does not have the requested medication in stock (ie Backordered), it is your responsibility to find another pharmacy that has the requested medication available. We will gladly send a new prescription to that pharmacy at your request.  controlled substances may not be able to be filled out of state due to license restrictions.  If  you have a planned trip, it's best to call your pharmacy at least 5-7 business days to prevent any delays in your medication refill.    Scheduling Tests:    If your physician has ordered radiology tests such as MRI or CT scans, please contact Central Scheduling at [phone] right away to schedule the test.  Once scheduled, the [facility] Centralized Referral Team will work with your insurance carrier to obtain pre-certification or prior authorization.  Depending on your insurance carrier, approval may take 3-10 days.  It is highly recommended patients assure they have received an authorization before having a test performed.  If test is done without insurance authorization, patient may be responsible for the entire amount billed.      Precertification and Prior Authorizations:  If your physician has recommended that you have a procedure or additional testing performed the [facility] Centralized Referral Team will contact your insurance carrier to obtain pre-certification or prior authorization.    You are strongly encouraged to contact your insurance carrier to verify that your procedure/test has been approved and is a COVERED benefit.  Although the [facility] Centralized Referral Team does its due diligence, the insurance carrier gives the disclaimer that \"Although the procedure is authorized, this does not guarantee payment.\"    Ultimately the patient is responsible for payment.   Thank you for your understanding in this matter.  Paperwork Completion:  If you require FMLA or disability paperwork for your recovery, please make sure to either drop it off or have it faxed to our office at [phone]. Be sure the form has your name and date of birth on it.  The form will be faxed to our Forms Department and they will complete it for you.  There is a 25$ fee for all forms that need to be filled out.  Please be aware there is a 10-14 day turnaround time.  You will need to sign a release of information (ARTEMIO) form if your paperwork does  not come with one.  You may call the Forms Department with any questions at [phone].  Their fax number is [phone].

## 2025-01-31 NOTE — PROGRESS NOTES
Subjective:   Chuy Dietz is a 84 year old male who presents for a MA (Medicare Advantage) Supervisit (Once per calendar year) and scheduled follow up of multiple significant but stable problems and Diabetes hypertension hypercholesterolemia asthma elevated PSA discussed results.      Patient is coming for follow-up of diabetes.  A1c 6.4. He is taking metformin.  Tolerates medication well.  Also having been watching diet.  Lost some weight.  Patient is working on his diet trying to limit sweets.  His eye examination was completed..  Denies  numbness no tingling in the feet.     Coronary artery disease  patient was started on rosuvastatin doing well.  Asymptomatic .  Recently is exercising less.  Patient is seeing cardiologist once a year.     Carotid atherosclerosis monitor ultrasound periodically.    Monitor ultrasound for stability of the findings check it later this year.  .  Aortic atherosclerosis  on cholesterol medication , monitor.    Fatty liver on imaging test monitor stable.  Liver function tests are normal.     Asthma/COPD patient stopped using inhalers he said he has not had any shortness of breath no wheezing.  Feeling okay.  His symptoms are coming on and off which is typical for him.  We will continue monitoring.      Elevated PSA patient seeing urologist had prostate examination by them.  Monitor PSA numbers.  Patient recently saw urologist and had prostate examination completed.    Patient noticed to have some urinary frequency and nocturia.  Urine test shows some white blood cells there was bacteria.  He was growing from the urine test patient has a history of kidney stones.  Started on antibiotic which she tolerates well.  Finish course of antibiotic recheck urine cultures 5 days after finishing the last pill of antibiotic.    Patient has some abnormal sensation/pain in the right knee which is coming on and off especially at night when trying to lay down.  Status post right knee  replacement.  Surgery was done by Dr. Gustavo Camp.  Will have patient to see new orthopedic doctor since Dr. Camp retired.  History/Other:   Fall Risk Assessment:   He has been screened for Falls and is low risk.      Cognitive Assessment:   He had a completely normal cognitive assessment - see flowsheet entries     Functional Ability/Status:   Chuy Dietz has some abnormal functions as listed below:  He has Hearing problems based on screening of functional status.He has problems with Memory based on screening of functional status.         Depression Screening (PHQ-2/PHQ-9): PHQ-2 SCORE: 0  , done 1/31/2025       Advanced Directives:   He does have a Living Will but we do NOT have it on file in Epic.    He does have a POA but we do NOT have it on file in Epic.    Discussed Advance Care Planning with patient (and family/surrogate if present). Standard forms made available to patient in After Visit Summary.      Patient Active Problem List   Diagnosis    Osteoarthrosis, localized, primary, knee    S/P TKR (total knee replacement)    Coronary atherosclerosis    History of adenomatous polyp of colon    Type 2 diabetes mellitus with other circulatory complications (HCC)    Diabetes mellitus type 2 without retinopathy (HCC)    Choroidal nevus of right eye    Pseudophakia    Fatty liver    Atherosclerosis of abdominal aorta    Left nephrolithiasis    Shortness of breath    Sleep difficulties    Asthma with COPD (chronic obstructive pulmonary disease) (HCC)    Coronary artery calcification seen on CT scan    Bilateral carotid artery stenosis     Allergies:  He has No Known Allergies.    Current Medications:  Outpatient Medications Marked as Taking for the 1/31/25 encounter (Office Visit) with Corinne Maria MD   Medication Sig    metFORMIN 500 MG Oral Tab Take 1 tablet (500 mg total) by mouth daily with breakfast.    rosuvastatin 5 MG Oral Tab Take 1 tablet (5 mg total) by mouth nightly.    ciprofloxacin 250 MG  Oral Tab Take 1 tablet (250 mg total) by mouth 2 (two) times daily for 10 days.    TAMSULOSIN 0.4 MG Oral Cap Take 1 capsule (0.4 mg total) by mouth daily.    finasteride 5 MG Oral Tab Take 1 tablet (5 mg total) by mouth daily.    aspirin 81 MG Oral Tab Take 1 tablet (81 mg total) by mouth daily.    Multiple Vitamins-Minerals (SUPER HIGH VITAMINS/MINERALS) Oral Tab Take 1 tablet by mouth daily.       Medical History:  He  has a past medical history of Asthma (HCC), Calculus of kidney, Diabetes (HCC), Disorder of liver, High cholesterol, Mild intermittent asthma (HCC) (4/3/2019), Mild intermittent asthma without complication (HCC) (9/11/2017), Obesity (BMI 30.0-34.9) (9/11/2017), and Short of breath on exertion (1/13/2016).  Surgical History:  He  has a past surgical history that includes appendectomy (1961); knee replacement surgery (2/1/2013); remv cataract extracap,insert lens (Left, 12/14/2015); remv cataract extracap,insert lens (Right, 1/4/2016); colonoscopy,remv lesn,snare (N/A, 5/14/2016); colonoscopy,biopsy (N/A, 5/14/2016); other surgical history (1986); other surgical history (03/24/2021); colonoscopy (N/A, 7/12/2022); and colonoscopy (N/A, 8/22/2022).   Family History:  His family history includes Cancer in his father; Other in his mother.  Social History:  He  reports that he has never smoked. He has never used smokeless tobacco. He reports current alcohol use. He reports that he does not use drugs.    Tobacco:  He has never smoked tobacco.    CAGE Alcohol Screen:   CAGE screening score of 0 on 1/31/2025, showing low risk of alcohol abuse.      Patient Care Team:  Corinne Maria MD as PCP - General (Family Medicine)    Review of Systems  GENERAL: feels well otherwise  SKIN: denies any unusual skin lesions  EYES: denies blurred vision or double vision  HEENT: denies nasal congestion, sinus pain or ST  LUNGS: denies shortness of breath with exertion  CARDIOVASCULAR: denies chest pain on exertion  GI:  denies abdominal pain, denies heartburn  : 1 per night nocturia, no complaint of urinary incontinence  MUSCULOSKELETAL: denies back pain ,   NEURO: denies headaches  PSYCHE: denies depression or anxiety  HEMATOLOGIC: denies hx of anemia  ENDOCRINE: denies thyroid history  ALL/ASTHMA: denies hx of allergy or asthma    Objective:   Physical Exam  General Appearance:  Alert, cooperative, no distress, appears stated age   Head:  Normocephalic, without obvious abnormality, atraumatic   Eyes:  PERRL, conjunctiva/corneas clear, EOM's intact, both eyes   Ears:  Normal TM's and external ear canals, both ears   Nose: Nares normal, septum midline, mucosa normal, no drainage or sinus tenderness   Throat: Lips, mucosa, and tongue normal; teeth and gums normal   Neck: Supple, symmetrical, trachea midline, no adenopathy, thyroid: not enlarged, symmetric, no tenderness/mass/nodules,    Back:   Symmetric, no curvature, ROM normal, no CVA tenderness   Lungs:   Clear to auscultation bilaterally, respirations unlabored   Chest Wall:  No tenderness or deformity   Heart:  Regular rate and rhythm, S1, S2 normal, no murmur, rub or gallop   Abdomen:   Soft, non-tender, bowel sounds active all four quadrants,  no masses, no organomegaly   Genitalia:  Deferred   Rectal:  Deferred done by urology   Extremities: Extremities normal, atraumatic, no cyanosis or edema   Pulses: 2+ and symmetric   Skin: Skin color, texture, turgor normal, no rashes or lesions   Lymph nodes: Cervical, supraclavicular, and axillary nodes normal   Neurologic: Normal  Musculoskeletal no tenderness over the right knee,  no right calf tenderness   Bilateral barefoot skin diabetic exam is normal, visualized feet and the appearance is normal except deformities of the feet due to mild osteoarthritis changes.   Bilateral monofilament/sensation of both feet is normal.  Pulsation pedal pulse exam of both lower legs/feet is normal as well.          /72 (BP Location: Left  arm, Patient Position: Sitting, Cuff Size: adult)   Pulse 68   Temp 97.2 °F (36.2 °C) (Temporal)   Resp 16   Ht 6' (1.829 m)   Wt 212 lb (96.2 kg)   BMI 28.75 kg/m²  Estimated body mass index is 28.75 kg/m² as calculated from the following:    Height as of this encounter: 6' (1.829 m).    Weight as of this encounter: 212 lb (96.2 kg).    Medicare Hearing Assessment:   Hearing Screening    Time taken: 1/31/2025  1:43 PM  Screening Method: Finger Rub  Finger Rub Result: Pass         Visual Acuity:   Right Eye Visual Acuity: Corrected Right Eye Chart Acuity: 20/30   Left Eye Visual Acuity: Corrected Left Eye Chart Acuity: 20/40   Both Eyes Visual Acuity: Corrected Both Eyes Chart Acuity: 20/25   Able To Tolerate Visual Acuity: Yes      Results for orders placed or performed in visit on 01/28/25   Hemoglobin A1C    Collection Time: 01/28/25  8:41 AM   Result Value Ref Range    HgbA1C 6.4 (H) <5.7 %    Estimated Average Glucose 137 (H) 68 - 126 mg/dL   Lipid Panel    Collection Time: 01/28/25  8:41 AM   Result Value Ref Range    Cholesterol, Total 133 <200 mg/dL    HDL Cholesterol 41 40 - 59 mg/dL    Triglycerides 98 30 - 149 mg/dL    LDL Cholesterol 74 <100 mg/dL    VLDL 15 0 - 30 mg/dL    Non HDL Chol 92 <130 mg/dL    Patient Fasting for Lipid? Yes    TSH and Free T4    Collection Time: 01/28/25  8:41 AM   Result Value Ref Range    Free T4 1.1 0.8 - 1.7 ng/dL    TSH 2.927 0.550 - 4.780 uIU/mL   Urinalysis with Culture Reflex    Collection Time: 01/28/25  8:41 AM    Specimen: Urine, clean catch   Result Value Ref Range    Urine Color Light-Yellow Yellow    Clarity Urine Clear Clear    Spec Gravity 1.019 1.005 - 1.030    Glucose Urine Normal Normal mg/dL    Bilirubin Urine Negative Negative    Ketones Urine Negative Negative mg/dL    Blood Urine Negative Negative    pH Urine 5.0 5.0 - 8.0    Protein Urine Negative Negative mg/dL    Urobilinogen Urine Normal Normal mg/dL    Nitrite Urine Negative Negative     Leukocyte Esterase Urine 25 (A) Negative    WBC Urine 6-10 (A) 0 - 5 /HPF    RBC Urine 0-2 0 - 2 /HPF    Bacteria Urine None Seen None Seen /HPF    Squamous Epi. Cells Few (A) None Seen /HPF    Renal Tubular Epithelial Cells None Seen None Seen /HPF    Transitional Cells None Seen None Seen /HPF    Yeast Urine None Seen None Seen /HPF   Magnesium [E]    Collection Time: 01/28/25  8:41 AM   Result Value Ref Range    Magnesium 1.9 1.6 - 2.6 mg/dL   Urine Culture, Routine    Collection Time: 01/28/25  8:41 AM    Specimen: Urine, clean catch   Result Value Ref Range    Urine Culture 10,000 - 50,000 CFU/ML Proteus mirabilis (A)        Susceptibility    Proteus mirabilis -  (no method available)     Ampicillin <=2 Sensitive      Cefazolin <=4 Sensitive      Ciprofloxacin <=0.25 Sensitive      Gentamicin <=1 Sensitive      Meropenem <=0.25 Sensitive      Levofloxacin <=0.12 Sensitive      Nitrofurantoin 128 Resistant      Piperacillin + Tazobactam <=4 Sensitive      Trimethoprim/Sulfa <=20 Sensitive        Assessment & Plan:   Chuy Dietz is a 84 year old male who presents for a Medicare Assessment.     1. Encounter for annual health examination (Primary)  2. Diabetes mellitus type 2 without retinopathy (HCC)  -     Hemoglobin A1C; Future; Expected date: 06/23/2025  -     Microalb/Creat Ratio, Random Urine; Future; Expected date: 06/23/2025  -     metFORMIN HCl; Take 1 tablet (500 mg total) by mouth daily with breakfast.  Dispense: 90 tablet; Refill: 1  3. Hyperlipidemia, unspecified hyperlipidemia type  -     Lipid Panel; Future; Expected date: 06/23/2025  -     Comp Metabolic Panel (14); Future; Expected date: 06/23/2025  -     Rosuvastatin Calcium; Take 1 tablet (5 mg total) by mouth nightly.  Dispense: 90 tablet; Refill: 1  4. Acute cystitis without hematuria  -     Urine Culture, Routine; Future; Expected date: 01/31/2025  5. Chronic pain of right knee  -     Ortho Referral - In Network  -     Cancel: XR Knee  (replaced) right routine (3 views) - [facility] Consult Only; Future; Expected date: 01/31/2025  -     Magnesium; Future; Expected date: 01/31/2025  6. Atherosclerosis of abdominal aorta  7. Coronary artery calcification seen on CT scan  8. Mild intermittent asthma, unspecified whether complicated (HCC)  9. Atherosclerosis of native coronary artery of native heart without angina pectoris  Overview:  1/27/17 UFCT calcium score 1334.3   2/7/17 nuclear stress test normal.  Dr Caban   10. Bilateral carotid artery stenosis  11. Type 2 diabetes mellitus with other circulatory complications (HCC)  12. Asthma with COPD (chronic obstructive pulmonary disease) (HCC)  13. Elevated PSA measurement  14. Fatty liver    Diabetes type 2 controlled, continue metformin monitor blood work check blood work before next visit.  Modify diet.    Hypercholesterolemia on medication stable continue present management    Elevated PSA patient seeing specialist monitored by them.  Rectal examination done by urologist.    Acute cystitis without hematuria urine cultures positive for Proteus patient started antibiotic finish course recheck urine cultures after finishing antibiotic.  Monitor for improvement in symptoms.    Asthma/ COPD stable continue monitoring patient is asymptomatic.  Does not want to use any inhalers.    Atherosclerosis of abdominal aorta on cholesterol-lowering medication monitor    Asthma intermittent mild stable continue present management.    Coronary artery calcification on the CT scan/atherosclerosis of the coronary arteries stable asymptomatic monitor heart scan periodically.  Patient is seeing cardiologist once a year.  Stable.    Fatty liver low-fat diet , monitor liver function test, stay active.    Diabetes type 2 with circulatory complication including coronary artery disease, carotid atherosclerosis stable continue present management    Bilateral carotid artery atherosclerosis/stenosis stable continue present  management.    Right knee pain chronic status post replacement refer to orthopedic doctor for evaluation.    Continue current meds.   Watch diet for fats and carbs.   Stay active.     will call you with magnesium results when those are back.  Finish course of antibiotic.  Recheck urine cultures 4 -5  days after finishing antibiotic.  Take Probiotic like Align  while on antibiotic.  Keep good hydration.  Do fasting blood work 1 week prior next visit in 6 months.  Do x-ray of your knee.  See orthopedic Dr. San for evaluation.    The patient indicates understanding of these issues and agrees to the plan.  Imaging studies ordered.  Lab work ordered.  Reinforced healthy diet, lifestyle, and exercise.      Return in about 6 months (around 7/31/2025).     Corinne Maria MD, 6/16/2023     Supplementary Documentation:   General Health:  In the past six months, have you lost more than 10 pounds without trying?: 2 - No  Has your appetite been poor?: No  Type of Diet: Balanced  How does the patient maintain a good energy level?: Appropriate Exercise  How would you describe your daily physical activity?: Light  How would you describe your current health state?: Good  How do you maintain positive mental well-being?: Puzzles;Visiting Family  On a scale of 0 to 10, with 0 being no pain and 10 being severe pain, what is your pain level?: 0 - (None)  In the past six months, have you experienced urine leakage?: 1-Yes  At any time do you feel concerned for the safety/well-being of yourself and/or your children, in your home or elsewhere?: No  Have you had any immunizations at another office such as Influenza, Hepatitis B, Tetanus, or Pneumococcal?: Yes        Chuy Dietz's SCREENING SCHEDULE   Tests on this list are recommended by your physician but may not be covered, or covered at this frequency, by your insurer.   Please check with your insurance carrier before scheduling to verify coverage.   PREVENTATIVE SERVICES  FREQUENCY &  COVERAGE DETAILS LAST COMPLETION DATE   Diabetes Screening    Fasting Blood Sugar / Glucose    One screening every 12 months if never tested or if previously tested but not diagnosed with pre-diabetes   One screening every 6 months if diagnosed with pre-diabetes Lab Results   Component Value Date     (H) 11/05/2024        Cardiovascular Disease Screening    Lipid Panel  Cholesterol  Lipoprotein (HDL)  Triglycerides Covered every 5 years for all Medicare beneficiaries without apparent signs or symptoms of cardiovascular disease Lab Results   Component Value Date    CHOLEST 133 01/28/2025    HDL 41 01/28/2025    LDL 74 01/28/2025    TRIG 98 01/28/2025         Electrocardiogram (EKG)   Covered if needed at Welcome to Medicare, and non-screening if indicated for medical reasons 11/01/2024      Ultrasound Screening for Abdominal Aortic Aneurysm (AAA) Covered once in a lifetime for one of the following risk factors    Men who are 65-75 years old and have ever smoked    Anyone with a family history -     Colorectal Cancer Screening  Covered for ages 50-85; only need ONE of the following:    Colonoscopy   Covered every 10 years    Covered every 2 years if patient is at high risk or previous colonoscopy was abnormal 08/22/2022    Health Maintenance   Topic Date Due    Colorectal Cancer Screening  08/22/2027       Flexible Sigmoidoscopy   Covered every 4 years -    Fecal Occult Blood Test Covered annually -   Prostate Cancer Screening    Prostate-Specific Antigen (PSA) Annually Lab Results   Component Value Date    PSA 3.46 06/18/2018     There are no preventive care reminders to display for this patient.   Immunizations    Influenza Covered once per flu season  Please get every year 09/16/2024  No recommendations at this time    Pneumococcal Each vaccine ([id number] & [id number]) covered once after 65 Prevnar 13: 01/13/2016    [id number]: 06/08/2018     No recommendations at this time    Hepatitis B One  screening covered for patients with certain risk factors   -  No recommendations at this time    Tetanus Toxoid Not covered by Medicare Part B unless medically necessary (cut with metal); may be covered with your pharmacy prescription benefits -    Tetanus, Diptheria and Pertusis TD and TDaP Not covered by Medicare Part B -  No recommendations at this time    Zoster Not covered by Medicare Part B; may be covered with your pharmacy  prescription benefits -  No recommendations at this time     Diabetes      Hemoglobin A1C Annually; if last result is elevated, may be asked to retest more frequently.    Medicare covers every 3 months Lab Results   Component Value Date     (H) 01/28/2025    A1C 6.4 (H) 01/28/2025       No recommendations at this time    Creat/alb ratio Annually Lab Results   Component Value Date    MICROALBCREA 11.8 10/29/2024       LDL Annually Lab Results   Component Value Date    LDL 74 01/28/2025       Dilated Eye Exam Annually Last Diabetic Eye Exam:  Last Dilated Eye Exam: 12/18/24  Eye Exam shows Diabetic Retinopathy?: No

## 2025-02-04 ENCOUNTER — TELEPHONE (OUTPATIENT)
Facility: CLINIC | Age: 85
End: 2025-02-04

## 2025-02-04 NOTE — TELEPHONE ENCOUNTER
Patient called to schedule for right knee pain. Patient states images are in epic. Please advise if further imaging is needed.   Future Appointments   Date Time Provider Department Center   3/21/2025 10:40 AM Sony San MD [facility]   7/23/2025  9:50 AM Donavon Campbell MD [facility]   7/23/2025  2:30 PM Corinne Maria MD [facility] [id number]

## 2025-02-07 ENCOUNTER — PATIENT MESSAGE (OUTPATIENT)
Dept: FAMILY MEDICINE CLINIC | Facility: CLINIC | Age: 85
End: 2025-02-07

## 2025-02-07 DIAGNOSIS — N13.8 BPH WITH OBSTRUCTION/LOWER URINARY TRACT SYMPTOMS: ICD-10-CM

## 2025-02-07 DIAGNOSIS — N40.1 BPH WITH OBSTRUCTION/LOWER URINARY TRACT SYMPTOMS: ICD-10-CM

## 2025-02-10 RX ORDER — FINASTERIDE 5 MG/1
5 TABLET, FILM COATED ORAL DAILY
Qty: 90 TABLET | Refills: 3 | Status: SHIPPED | OUTPATIENT
Start: 2025-02-10

## 2025-02-10 NOTE — TELEPHONE ENCOUNTER
Refill for finasteride sent to pharmacy per protocol.     Benign Prostatic Hypertrophy Medications      Protocol Criteria:  Appointment scheduled in the past 12 months or in the next 2 months  If patients is between the ages of 50 and 70 then PSA done within the last 2 years and is either  Less than or equal to 4.0 ng/ml  Or if greater than 4.0 there is no significant increase (>0.5 ng/ml) in PSA over the last 2 years    Recent Outpatient Visits              1 week ago Encounter for annual health examination    [address] Corinne Maria MD    Office Visit    3 weeks ago BPH with obstruction/lower urinary tract symptoms    [facility] Donavon Campbell MD    Office Visit    1 month ago Chronic pansinusitis    [facility] [place] D[place] Perry Rodney T, MD    Office Visit    3 months ago Preop testing    [address] Corinne Maria MD    Office Visit    3 months ago Chronic pansinusitis    [facility] [place] Rodney T, MD    Office Visit          Future Appointments         Provider Department Appt Notes    In 1 month Sony San MD [address] Right knee pain - images in epic    In 5 months Donavon Campbell MD [facility] 1 year    In 5 months Corinne Maria MD [address] Follow up                PSA:    Lab Results   Component Value Date    PSA 3.46 06/18/2018    PSA 2.440 09/15/2017       PSA Screen:    Lab Results   Component Value Date    PSAS 4.59 (H) 05/01/2024    PSAS 4.35 (H) 10/06/2022    PSAS 3.36 01/13/2021    PSAS 3.86 08/19/2019

## 2025-02-14 ENCOUNTER — LAB ENCOUNTER (OUTPATIENT)
Dept: LAB | Age: 85
End: 2025-02-14
Attending: FAMILY MEDICINE
Payer: MEDICARE

## 2025-02-14 DIAGNOSIS — R35.0 URINARY FREQUENCY: ICD-10-CM

## 2025-02-14 DIAGNOSIS — N30.00 ACUTE CYSTITIS WITHOUT HEMATURIA: ICD-10-CM

## 2025-02-14 LAB
BILIRUB UR QL STRIP.AUTO: NEGATIVE
CLARITY UR REFRACT.AUTO: CLEAR
COLOR UR AUTO: YELLOW
GLUCOSE UR STRIP.AUTO-MCNC: NORMAL MG/DL
KETONES UR STRIP.AUTO-MCNC: NEGATIVE MG/DL
LEUKOCYTE ESTERASE UR QL STRIP.AUTO: NEGATIVE
NITRITE UR QL STRIP.AUTO: NEGATIVE
PH UR STRIP.AUTO: 6 [PH] (ref 5–8)
PROT UR STRIP.AUTO-MCNC: NEGATIVE MG/DL
RBC UR QL AUTO: NEGATIVE
SP GR UR STRIP.AUTO: 1.02 (ref 1–1.03)
UROBILINOGEN UR STRIP.AUTO-MCNC: NORMAL MG/DL

## 2025-02-14 PROCEDURE — 87086 URINE CULTURE/COLONY COUNT: CPT

## 2025-02-14 PROCEDURE — 81003 URINALYSIS AUTO W/O SCOPE: CPT

## 2025-04-01 RX ORDER — TAMSULOSIN HYDROCHLORIDE 0.4 MG/1
0.4 CAPSULE ORAL DAILY
Qty: 90 CAPSULE | Refills: 0 | Status: SHIPPED | OUTPATIENT
Start: 2025-04-01

## 2025-04-01 NOTE — TELEPHONE ENCOUNTER
- LOV on 1/20/25, protocol met, refill approved    PLAN:  1.  Electing conservative management of left kidney stones for the time being.  He will consider definitive management.     2.  Continue finasteride and tamsulosin for management of BPH with LUTS.     Follow-up in 6 months for an updated IPSS score and bladder scan/PVR.  Patient will consider minimally invasive surgical treatment options for his BPH.        Donavon Campbell MD  1/20/2025

## 2025-05-09 ENCOUNTER — PATIENT MESSAGE (OUTPATIENT)
Dept: FAMILY MEDICINE CLINIC | Facility: CLINIC | Age: 85
End: 2025-05-09

## 2025-05-09 DIAGNOSIS — R97.20 ELEVATED PSA: ICD-10-CM

## 2025-05-09 DIAGNOSIS — R97.20 ELEVATED PSA MEASUREMENT: Primary | ICD-10-CM

## 2025-05-12 NOTE — TELEPHONE ENCOUNTER
Patients urologist is retiring. He would like a recommendation for a [facility]rologist in the [facility].

## 2025-05-22 ENCOUNTER — TELEPHONE (OUTPATIENT)
Dept: SURGERY | Facility: CLINIC | Age: 85
End: 2025-05-22

## 2025-06-07 DIAGNOSIS — J44.89 ASTHMA WITH COPD (CHRONIC OBSTRUCTIVE PULMONARY DISEASE) (HCC): Chronic | ICD-10-CM

## 2025-06-09 ENCOUNTER — OFFICE VISIT (OUTPATIENT)
Dept: SURGERY | Facility: CLINIC | Age: 85
End: 2025-06-09
Payer: MEDICARE

## 2025-06-09 DIAGNOSIS — R33.9 INCOMPLETE BLADDER EMPTYING: ICD-10-CM

## 2025-06-09 DIAGNOSIS — N13.8 BPH WITH OBSTRUCTION/LOWER URINARY TRACT SYMPTOMS: Primary | ICD-10-CM

## 2025-06-09 DIAGNOSIS — N40.1 BPH WITH OBSTRUCTION/LOWER URINARY TRACT SYMPTOMS: Primary | ICD-10-CM

## 2025-06-09 DIAGNOSIS — N20.0 NEPHROLITHIASIS: ICD-10-CM

## 2025-06-09 PROCEDURE — 99214 OFFICE O/P EST MOD 30 MIN: CPT | Performed by: UROLOGY

## 2025-06-09 PROCEDURE — 1159F MED LIST DOCD IN RCRD: CPT | Performed by: UROLOGY

## 2025-06-09 RX ORDER — BUDESONIDE AND FORMOTEROL FUMARATE DIHYDRATE 160; 4.5 UG/1; UG/1
2 AEROSOL RESPIRATORY (INHALATION) 2 TIMES DAILY
Qty: 10.2 G | Refills: 0 | OUTPATIENT
Start: 2025-06-09

## 2025-06-09 RX ORDER — TAMSULOSIN HYDROCHLORIDE 0.4 MG/1
0.4 CAPSULE ORAL
Qty: 90 CAPSULE | Refills: 3 | Status: SHIPPED | OUTPATIENT
Start: 2025-06-09

## 2025-06-09 RX ORDER — FINASTERIDE 5 MG/1
5 TABLET, FILM COATED ORAL DAILY
Qty: 90 TABLET | Refills: 3 | Status: SHIPPED | OUTPATIENT
Start: 2025-06-09

## 2025-06-09 NOTE — PROGRESS NOTES
[facility] Urology  Follow-Up Visit    HPI: Chuy Dietz is a 84 year old male presents for a follow up visit. Patient was last seen on 1/20/2025.    INTERVAL HISTORY: Following up regarding BPH with LUTS, incomplete bladder emptying and nephrolithiasis.    CT urogram completed 9/26/2024 revealing multiple left nonobstructing kidney stones in the upper pole with a dominant stone measuring 13 x 10 mm.  Additional smaller nonobstructing calculi within a calyceal diverticulum of the upper pole distal to the larger calculus.  No hydronephrosis.    He denies flank pain.  Elected conservative management.    On maximal medical therapy for BPH with LUTS and incomplete bladder emptying with tamsulosin and finasteride.     He reports stable lower urinary tract symptoms.  Still reports urinary urgency and weak stream.    His IPSS is 16.5 (2/1/2/3/3/2/3.5) with a QOL score of 3.    Bladder scan revealed PVR of 340 mL.    Mildly elevated PSA, age appropriate.  Decided to monitor.      1.  BPH with LUTS  2.  Elevated PSA  3.  Microscopic hematuria  4.  Nephrolithiasis  Patient with mildly elevated screening PSA levels since October 2022.  Levels have been largely stable, most recently 4.59 ng/mL from 5/1/2024.     History of nephrolithiasis.  He underwent left ureteroscopy with laser lithotripsy by Dr. Boykin 3/2021.  He passed a 4 mm stone in 2023.  Currently denies flank pain.     He has BPH with bothersome obstructive LUTS.  He has been on tamsulosin for a few years.       He reports an IPSS of 15 (1/1/1///1/3) with a quality-of-life score of 4.  He is mostly bothered by urinary urgency and weak stream.  He wakes up about 3 times at night to urinate.     Urinalysis from 5/1/2024 revealing microscopic hematuria with 3-5 RBCs per hpf.     - 9/2024 consult: Bladder scan  mL.  Continue Flomax.  Start finasteride.  Monitor PSA.  Microhematuria evaluation.    CT urogram revealing left upper pole  nonobstructing kidney stones including a dominant 13 mm stone with additional smaller stones in a calyceal diverticulum distal to the larger stone.    - 10/2024 follow-up: Cystoscopy revealing 1+ enlarged prostate mainly with lateral lobe hypertrophy.  Heavily trabeculated bladder 3-4+.  Continue finasteride and tamsulosin.  He elects monitoring his kidney stones for the time being.    - 1/2025 F/U: On Flomax and Proscar.  IPSS 15 (1/2/1/4/4/0/3) with a QOL score of 3.  Bladder scan  mL.  Would like to continue current management.  Asymptomatic from kidney stone standpoint and would like to continue conservative management as well.    - 6/2025 F/U: On Flomax and Proscar.  IPSS 16.5 with a QOL score of 3.  PVR increased to 340 mL.  Options of close monitoring, considering  MIST for BPH or self CIC discussed.  He would like to continue monitoring and follow-up in about 8 weeks.        PAST MEDICAL HISTORY: Hyperlipidemia.  Diabetes.  Nephrolithiasis.     PAST SURGICAL HISTORY: Left ureteroscopy with laser lithotripsy 2021.  Total knee replacement.  Appendectomy 1961.  Cataract surgery     SOCIAL HISTORY:  and has 2 daughters.  No smoking or illicit drug use.  Social alcohol.  Retired and used to work in finance.     Reviewed past medical, surgical, family, and social history.  Reviewed med list and allergies.      REVIEW OF SYSTEMS:  Pertinent positives and negatives per HPI. A 10-point ROS was performed and is otherwise negative.       EXAM:  There were no vitals taken for this visit.    Physical Exam  Constitutional:       Appearance: He is well-developed.   HENT:      Head: Normocephalic.   Eyes:      General: No scleral icterus.  Cardiovascular:      Rate and Rhythm: Normal rate.   Pulmonary:      Effort: Pulmonary effort is normal.   Skin:     General: Skin is warm and dry.   Neurological:      Mental Status: He is alert and oriented to person, place, and time.   Psychiatric:         Mood and  Affect: Mood normal.         Behavior: Behavior normal.       PATHOLOGY:  No results found.      LABS:      PSA Screen    Latest Ref Rng <=4.00 ng/mL   1/14/2016 2.0   9/15/2017 2.440   6/18/2018 3.46   8/19/2019 3.86    1/13/2021 3.36    10/6/2022 4.35 (H)    5/1/2024 4.59 (H)       Legend:  (H) High      IMAGING:    CT UROGRAM (9/27/2024):     KIDNEYS:  The right kidney is normal.  There are multiple nonobstructing left renal calculi the largest calculus is in the left upper pole measuring 13 x 10 mm.  There are multiple additional 1.5-3 mm nonobstructing calculi within a calyceal diverticulum  of the upper pole distal to the larger calculus.  The dilated left upper pole calyx measures approximately 21 x 16 mm.  There is an adjacent slightly prominent calyceal diverticulum more anterior laterally measuring 6 x 6 mm.  Normal enhancement bilaterally.  Minimal perinephric stranding on the left.  No obstruction.      CONCLUSION:  1. There are multiple nonobstructing left intrarenal calculi ranging in size from 1.5 mm up to 13 mm in the upper pole.  Distal to this 13 mm calculus there are distal calyceal diverticula measuring 21 mm and 6 mm.  No hydroureter.  Mild diffuse bladder wall thickening with a mildly enlarged prostate.  This bladder wall thickening most likely relates to conduct outlet obstruction with other etiologies less likely.  The right kidney is normal.  No mass lesion. 2. There are additional nonemergent incidental findings as described above.         UROLOGY PROCEDURE:  None performed today.      IMPRESSION:  84 year old male with microscopic hematuria, nephrolithiasis, BPH with LUTS.  Mildly elevated PSA.    Microhematuria evaluation completed including CT urogram and cystoscopy.    CT urogram revealing multiple nonobstructing left kidney stones with a dominant stone measuring about 13 mm in the upper pole calyx.    Cystoscopy revealing mild prostate enlargement with a heavily trabeculated  bladder.    On maximal medical therapy for BPH with finasteride and tamsulosin daily.    Moderate persistent lower urinary tract symptoms per IPSS score.  Elevated postvoid residual volumes that have significantly increased compared to last visit.    Kidney stone management options discussed including watchful waiting, ureteroscopy with laser lithotripsy, ESWL with or without restenting.  Reviewed rationale, approach, benefits, risks, possible complications, and reasonable alternatives.  Patient would like to pursue conservative management at this point.    Regarding his BPH with LUTS: Discussed his rising postvoid residual volumes.  Options of continuing maximal medical therapy with close follow-up versus considering minimally invasive surgical treatment options for his BPH such as UroLift versus considering self CIC.  Benefits, risks, and alternatives of treatment were discussed.      Patient would like to continue current management for the time being and follow-up in about 6 to 8 weeks.    All questions answered.  Patient agreeable.      PLAN:  1.  Electing conservative management of left kidney stones for the time being.  He will consider definitive management.    2.  Continue finasteride and tamsulosin for management of BPH with LUTS.    Follow-up in 6 to 8 weeks for an updated IPSS score and bladder scan/PVR.  Patient will consider minimally invasive surgical treatment options for his BPH versus initiating self CIC for his elevated PVRs.      Donavon Campbell MD  6/9/2025

## 2025-06-10 ENCOUNTER — OFFICE VISIT (OUTPATIENT)
Dept: FAMILY MEDICINE CLINIC | Facility: CLINIC | Age: 85
End: 2025-06-10
Payer: MEDICARE

## 2025-06-10 VITALS
TEMPERATURE: 98 F | RESPIRATION RATE: 18 BRPM | SYSTOLIC BLOOD PRESSURE: 144 MMHG | WEIGHT: 211 LBS | HEART RATE: 80 BPM | DIASTOLIC BLOOD PRESSURE: 68 MMHG | OXYGEN SATURATION: 95 % | BODY MASS INDEX: 29 KG/M2

## 2025-06-10 DIAGNOSIS — H10.32 ACUTE CONJUNCTIVITIS OF LEFT EYE, UNSPECIFIED ACUTE CONJUNCTIVITIS TYPE: Primary | ICD-10-CM

## 2025-06-10 PROCEDURE — 3077F SYST BP >= 140 MM HG: CPT | Performed by: NURSE PRACTITIONER

## 2025-06-10 PROCEDURE — 1159F MED LIST DOCD IN RCRD: CPT | Performed by: NURSE PRACTITIONER

## 2025-06-10 PROCEDURE — 99213 OFFICE O/P EST LOW 20 MIN: CPT | Performed by: NURSE PRACTITIONER

## 2025-06-10 PROCEDURE — 3078F DIAST BP <80 MM HG: CPT | Performed by: NURSE PRACTITIONER

## 2025-06-10 PROCEDURE — 1160F RVW MEDS BY RX/DR IN RCRD: CPT | Performed by: NURSE PRACTITIONER

## 2025-06-10 RX ORDER — OFLOXACIN 3 MG/ML
2 SOLUTION/ DROPS OPHTHALMIC 4 TIMES DAILY
Qty: 5 ML | Refills: 0 | Status: SHIPPED | OUTPATIENT
Start: 2025-06-10 | End: 2025-06-17

## 2025-06-10 NOTE — PROGRESS NOTES
Subjective:   Patient ID: Chuy Dietz is a 84 year old male.    Eye Problem   The left eye is affected. This is a new problem. Episode onset: in the past two days. The problem occurs constantly. The problem has been unchanged. There was no injury mechanism. The patient is experiencing no pain. There is No known exposure to pink eye. He Does not wear contacts. Associated symptoms include an eye discharge and eye redness. Pertinent negatives include no photophobia. Associated symptoms comments: Mild rhinorrhea. He has tried nothing for the symptoms.       History/Other:   Review of Systems   Eyes:  Positive for discharge and redness. Negative for photophobia.   All other systems reviewed and are negative.    Current Medications[1]  Allergies:Allergies[2]    /68   Pulse 80   Temp 98.1 °F (36.7 °C) (Oral)   Resp 18   Wt 211 lb (95.7 kg)   SpO2 95%   BMI 28.62 kg/m²       Objective:   Physical Exam  Constitutional:       General: He is not in acute distress.     Appearance: Normal appearance. He is not ill-appearing.   HENT:      Head: Normocephalic and atraumatic.      Nose: Nose normal.      Mouth/Throat:      Mouth: Mucous membranes are moist.   Eyes:      General:         Left eye: Discharge present.     Extraocular Movements: Extraocular movements intact.      Conjunctiva/sclera:      Left eye: Left conjunctiva is injected. Chemosis present.      Pupils: Pupils are equal, round, and reactive to light.      Comments: Lower lid with mild redness and swelling,  left eye conjunctiva reddened, green discharge noted, mild scleral chemosis noted.   Cardiovascular:      Rate and Rhythm: Normal rate and regular rhythm.      Pulses: Normal pulses.   Pulmonary:      Effort: Pulmonary effort is normal. No respiratory distress.      Breath sounds: Normal breath sounds.   Musculoskeletal:         General: Normal range of motion.      Cervical back: Normal range of motion and neck supple.   Lymphadenopathy:       Cervical: No cervical adenopathy.   Skin:     General: Skin is warm and dry.   Neurological:      General: No focal deficit present.      Mental Status: He is alert.   Psychiatric:         Mood and Affect: Mood normal.         Assessment & Plan:   1. Acute conjunctivitis of left eye, unspecified acute conjunctivitis type        No orders of the defined types were placed in this encounter.      Meds This Visit:  Requested Prescriptions     Signed Prescriptions Disp Refills    ofloxacin 0.3 % Ophthalmic Solution 5 mL 0     Sig: Place 2 drops into the left eye 4 (four) times daily for 7 days.   There are no Patient Instructions on file for this visit.  Medication use and risk/benefit discussed. Eye comfort discussed. To eye doctor if any worsening symptoms or no improvement with treatment plan. Patient verbalized understanding and agrees to plan.         [1]   Current Outpatient Medications   Medication Sig Dispense Refill    ofloxacin 0.3 % Ophthalmic Solution Place 2 drops into the left eye 4 (four) times daily for 7 days. 5 mL 0    finasteride 5 MG Oral Tab Take 1 tablet (5 mg total) by mouth daily. 90 tablet 3    tamsulosin 0.4 MG Oral Cap Take 1 capsule (0.4 mg total) by mouth After dinner. 90 capsule 3    metFORMIN 500 MG Oral Tab Take 1 tablet (500 mg total) by mouth daily with breakfast. 90 tablet 1    rosuvastatin 5 MG Oral Tab Take 1 tablet (5 mg total) by mouth nightly. 90 tablet 1    Multiple Vitamins-Minerals (SUPER HIGH VITAMINS/MINERALS) Oral Tab Take 1 tablet by mouth daily.     [2] No Known Allergies

## 2025-06-30 ENCOUNTER — PATIENT MESSAGE (OUTPATIENT)
Dept: FAMILY MEDICINE CLINIC | Facility: CLINIC | Age: 85
End: 2025-06-30

## 2025-07-01 NOTE — TELEPHONE ENCOUNTER
Patient called back regarding the [employer] message.     Patient complains of intermittent, worsening pain from his right hip going down the right leg. Difficult to localize the starting point. Not shooting pain. Worse with sitting, laying in bed at night, and certain movements. Feels like he \"loses power with certain movements\". Per patient, he has a history of a right knee replacement.     Current pain rated 3 out of 10. At night becomes 6-7 out of 10. Per patient, he has not been seen for this here before.

## 2025-07-02 NOTE — TELEPHONE ENCOUNTER
Patient called back and informed of below.  Per patient he does have a referral already for Dr. San, but I informed him that I'm not sure if Dr. San will do hip also.  Per patient he will call Dr. San's office first and will confirm and he does see hip also, patient is advised to call us back to resubmit another referral to include hip pain.  If not, then will have to change to Dr. Menchaca who can cover both knee and hip.Patient voiced understanding.

## 2025-07-08 DIAGNOSIS — E78.5 HYPERLIPIDEMIA, UNSPECIFIED HYPERLIPIDEMIA TYPE: ICD-10-CM

## 2025-07-08 NOTE — TELEPHONE ENCOUNTER
Reached patient for med adherence consult. Per insurance report, patient is past due for refill on rosuvastatin.     Patient tells me he is still taking the rosuvastatin, however he is only taking it three times weekly vs daily as prescribed. He tells me PCP is aware of this dosing as daily dosing was causing side effects. Will send new script with updated directions for PCP to review.     Did provide education and stressed the importance of taking medication just like prescribed to get the most benefit. Patient denies forgetting or missing any medication doses. Patient is complaining of knee and hip pain and is waiting to be seen by ortho for consult. Patient denies any other questions or concerns with medications at this time.

## 2025-07-09 RX ORDER — ROSUVASTATIN CALCIUM 5 MG/1
5 TABLET, COATED ORAL
Qty: 12 TABLET | Refills: 0 | Status: SHIPPED | OUTPATIENT
Start: 2025-07-09

## 2025-07-14 ENCOUNTER — PATIENT MESSAGE (OUTPATIENT)
Dept: FAMILY MEDICINE CLINIC | Facility: CLINIC | Age: 85
End: 2025-07-14

## 2025-07-16 ENCOUNTER — LAB ENCOUNTER (OUTPATIENT)
Dept: LAB | Age: 85
End: 2025-07-16
Attending: FAMILY MEDICINE
Payer: MEDICARE

## 2025-07-16 DIAGNOSIS — E11.9 DIABETES MELLITUS TYPE 2 WITHOUT RETINOPATHY (HCC): ICD-10-CM

## 2025-07-16 DIAGNOSIS — E78.5 HYPERLIPIDEMIA, UNSPECIFIED HYPERLIPIDEMIA TYPE: ICD-10-CM

## 2025-07-16 LAB
ALBUMIN SERPL-MCNC: 4.7 G/DL (ref 3.2–4.8)
ALBUMIN/GLOB SERPL: 1.8 {RATIO} (ref 1–2)
ALP LIVER SERPL-CCNC: 51 U/L (ref 45–117)
ALT SERPL-CCNC: 16 U/L (ref 10–49)
ANION GAP SERPL CALC-SCNC: 5 MMOL/L (ref 0–18)
AST SERPL-CCNC: 20 U/L (ref ?–34)
BILIRUB SERPL-MCNC: 1 MG/DL (ref 0.2–1.1)
BUN BLD-MCNC: 15 MG/DL (ref 9–23)
CALCIUM BLD-MCNC: 9.7 MG/DL (ref 8.7–10.6)
CHLORIDE SERPL-SCNC: 105 MMOL/L (ref 98–112)
CHOLEST SERPL-MCNC: 133 MG/DL (ref ?–200)
CO2 SERPL-SCNC: 30 MMOL/L (ref 21–32)
CREAT BLD-MCNC: 1.08 MG/DL (ref 0.7–1.3)
CREAT UR-SCNC: 137.1 MG/DL
EGFRCR SERPLBLD CKD-EPI 2021: 68 ML/MIN/1.73M2 (ref 60–?)
EST. AVERAGE GLUCOSE BLD GHB EST-MCNC: 157 MG/DL (ref 68–126)
FASTING PATIENT LIPID ANSWER: YES
FASTING STATUS PATIENT QL REPORTED: YES
GLOBULIN PLAS-MCNC: 2.6 G/DL (ref 2–3.5)
GLUCOSE BLD-MCNC: 126 MG/DL (ref 70–99)
HBA1C MFR BLD: 7.1 % (ref ?–5.7)
HDLC SERPL-MCNC: 41 MG/DL (ref 40–59)
LDLC SERPL CALC-MCNC: 69 MG/DL (ref ?–100)
MICROALBUMIN UR-MCNC: 1.9 MG/DL
MICROALBUMIN/CREAT 24H UR-RTO: 13.9 UG/MG (ref ?–30)
NONHDLC SERPL-MCNC: 92 MG/DL (ref ?–130)
OSMOLALITY SERPL CALC.SUM OF ELEC: 292 MOSM/KG (ref 275–295)
POTASSIUM SERPL-SCNC: 4.2 MMOL/L (ref 3.5–5.1)
PROT SERPL-MCNC: 7.3 G/DL (ref 5.7–8.2)
SODIUM SERPL-SCNC: 140 MMOL/L (ref 136–145)
TRIGL SERPL-MCNC: 128 MG/DL (ref 30–149)
VLDLC SERPL CALC-MCNC: 19 MG/DL (ref 0–30)

## 2025-07-16 PROCEDURE — 83036 HEMOGLOBIN GLYCOSYLATED A1C: CPT

## 2025-07-16 PROCEDURE — 80061 LIPID PANEL: CPT

## 2025-07-16 PROCEDURE — 82570 ASSAY OF URINE CREATININE: CPT

## 2025-07-16 PROCEDURE — 80053 COMPREHEN METABOLIC PANEL: CPT

## 2025-07-16 PROCEDURE — 82043 UR ALBUMIN QUANTITATIVE: CPT

## 2025-07-16 PROCEDURE — 36415 COLL VENOUS BLD VENIPUNCTURE: CPT

## 2025-07-23 ENCOUNTER — TELEPHONE (OUTPATIENT)
Dept: SURGERY | Facility: CLINIC | Age: 85
End: 2025-07-23

## 2025-07-23 ENCOUNTER — OFFICE VISIT (OUTPATIENT)
Dept: FAMILY MEDICINE CLINIC | Facility: CLINIC | Age: 85
End: 2025-07-23
Payer: MEDICARE

## 2025-07-23 VITALS
TEMPERATURE: 97 F | HEART RATE: 72 BPM | OXYGEN SATURATION: 95 % | DIASTOLIC BLOOD PRESSURE: 58 MMHG | SYSTOLIC BLOOD PRESSURE: 130 MMHG | BODY MASS INDEX: 27.5 KG/M2 | HEIGHT: 72 IN | RESPIRATION RATE: 16 BRPM | WEIGHT: 203 LBS

## 2025-07-23 DIAGNOSIS — B35.6 TINEA CRURIS: ICD-10-CM

## 2025-07-23 DIAGNOSIS — I25.10 ATHEROSCLEROSIS OF NATIVE CORONARY ARTERY OF NATIVE HEART WITHOUT ANGINA PECTORIS: ICD-10-CM

## 2025-07-23 DIAGNOSIS — I65.23 BILATERAL CAROTID ARTERY STENOSIS: ICD-10-CM

## 2025-07-23 DIAGNOSIS — J45.20 MILD INTERMITTENT ASTHMA, UNSPECIFIED WHETHER COMPLICATED (HCC): ICD-10-CM

## 2025-07-23 DIAGNOSIS — I25.10 CORONARY ARTERY CALCIFICATION SEEN ON CT SCAN: ICD-10-CM

## 2025-07-23 DIAGNOSIS — K76.0 FATTY LIVER: ICD-10-CM

## 2025-07-23 DIAGNOSIS — R97.20 ELEVATED PSA MEASUREMENT: ICD-10-CM

## 2025-07-23 DIAGNOSIS — E11.9 DIABETES MELLITUS TYPE 2 WITHOUT RETINOPATHY (HCC): Primary | ICD-10-CM

## 2025-07-23 DIAGNOSIS — E11.59 TYPE 2 DIABETES MELLITUS WITH OTHER CIRCULATORY COMPLICATIONS (HCC): ICD-10-CM

## 2025-07-23 DIAGNOSIS — E78.5 HYPERLIPIDEMIA, UNSPECIFIED HYPERLIPIDEMIA TYPE: ICD-10-CM

## 2025-07-23 PROCEDURE — 3008F BODY MASS INDEX DOCD: CPT | Performed by: FAMILY MEDICINE

## 2025-07-23 PROCEDURE — 1160F RVW MEDS BY RX/DR IN RCRD: CPT | Performed by: FAMILY MEDICINE

## 2025-07-23 PROCEDURE — 1159F MED LIST DOCD IN RCRD: CPT | Performed by: FAMILY MEDICINE

## 2025-07-23 PROCEDURE — 3075F SYST BP GE 130 - 139MM HG: CPT | Performed by: FAMILY MEDICINE

## 2025-07-23 PROCEDURE — 99214 OFFICE O/P EST MOD 30 MIN: CPT | Performed by: FAMILY MEDICINE

## 2025-07-23 PROCEDURE — 3078F DIAST BP <80 MM HG: CPT | Performed by: FAMILY MEDICINE

## 2025-07-23 PROCEDURE — G2211 COMPLEX E/M VISIT ADD ON: HCPCS | Performed by: FAMILY MEDICINE

## 2025-07-23 RX ORDER — TRIAMCINOLONE ACETONIDE 1 MG/G
1 CREAM TOPICAL 2 TIMES DAILY
Qty: 30 G | Refills: 0 | Status: SHIPPED | OUTPATIENT
Start: 2025-07-23

## 2025-07-23 RX ORDER — NYSTATIN 100000 U/G
1 CREAM TOPICAL 2 TIMES DAILY
Qty: 60 G | Refills: 1 | Status: SHIPPED | OUTPATIENT
Start: 2025-07-23

## 2025-07-23 RX ORDER — ROSUVASTATIN CALCIUM 5 MG/1
5 TABLET, COATED ORAL
Qty: 12 TABLET | Refills: 0 | Status: SHIPPED | OUTPATIENT
Start: 2025-07-23

## 2025-07-23 NOTE — TELEPHONE ENCOUNTER
Patient concerned that he is having to wake up at night 5-6 times due to urine frequency. Patient is a prior patient of Dr. Cook and has appointment for first available 8/21 and does not wish to schedule with aprn. Please call at [phone],thanks.

## 2025-07-23 NOTE — TELEPHONE ENCOUNTER
LMTCB    I was going to triage patient's symptoms are offer him an appointment in [place] if they have sooner appointment?

## 2025-07-23 NOTE — PROGRESS NOTES
Chuy Dietz is a 84 year old male.  Hyperlipidemia, diabetes, BPH, elevated PSA, coronary arthrosclerosis, sinus problem.  HPI:     The following individual(s) verbally consented to be recorded using ambient AI listening technology and understand that they can each withdraw their consent to this listening technology at any point by asking the clinician to turn off or pause the recording:    Patient name: Chuy Dietz    History of Present Illness  Chuy Dietz is an 84-year-old male with diabetes who presents for a medication check and follow-up on blood sugar control.    He has experienced an increase in his hemoglobin A1c from 6.4% in January to 7.1%, which he attributes to increased consumption of cake and coffee. He is currently on metformin.    He has experienced significant weight loss, approximately nine pounds since January, which he attributes to differences in clothing weight during measurements. He monitors his weight at home and reports consistency with the clinic's measurements.    He experiences nocturia, waking up five to six times per night to urinate, which disrupts his sleep. He previously saw a urologist in June who prescribed a medication that initially helped but is no longer effective. He is awaiting an appointment with a new urologist, Dr. Merchant, in October.  Patient would like to call the urology office and see if he could be seen sooner.  The patient has BPH and elevated prostate specific antigen.  Previously patient saw Dr. Gallegos.  Recent urologist he saw is not practicing anymore.  He mentions having inflammation and irritation in the groin area, which he describes as 'a lot of inflammation' and 'sore'.  Has rash.  Worse in the summer.  Will try creams.    Hyperlipidemia patient taking rosuvastatin 5 mg daily doing well with medication monitor.    Coronary arthrosclerosis/aortic atherosclerosis on medication monitor periodically healthy well-balanced diet.    No  chest pain or shortness of breath.     Current Outpatient Medications   Medication Sig Dispense Refill    rosuvastatin 5 MG Oral Tab Take 1 tablet (5 mg total) by mouth 3 (three) times a week. 12 tablet 0    metFORMIN 500 MG Oral Tab Take 1 tablet (500 mg total) by mouth daily with breakfast. 90 tablet 1    nystatin 100,000 Units/g External Cream Apply 1 Application topically 2 (two) times daily. 60 g 1    triamcinolone 0.1 % External Cream Apply 1 Application topically 2 (two) times daily. Use for 10 days 30 g 0    finasteride 5 MG Oral Tab Take 1 tablet (5 mg total) by mouth daily. 90 tablet 3    tamsulosin 0.4 MG Oral Cap Take 1 capsule (0.4 mg total) by mouth After dinner. 90 capsule 3    Multiple Vitamins-Minerals (SUPER HIGH VITAMINS/MINERALS) Oral Tab Take 1 tablet by mouth daily.        Past Medical History:    Asthma (HCC)    Calculus of kidney    Diabetes (HCC)    Disorder of liver    fatty liver per problem list    High cholesterol    Mild intermittent asthma (HCC)    Mild intermittent asthma without complication (HCC)    Obesity (BMI 30.0-34.9)    Short of breath on exertion      Social History:  Social History     Socioeconomic History    Marital status:    Tobacco Use    Smoking status: Never    Smokeless tobacco: Never   Vaping Use    Vaping status: Never Used   Substance and Sexual Activity    Alcohol use: Yes     Alcohol/week: 0.0 standard drinks of alcohol     Comment: 1 Lopez Lite at dinner    Drug use: No   Other Topics Concern    Caffeine Concern Yes     Comment: 2 cups of coffee a day    Exercise Yes     Comment: daily    Seat Belt Yes        REVIEW OF SYSTEMS:   GENERAL HEALTH: feels well otherwise  SKIN: denies any unusual skin lesions, having rash in the groin area  HEENT  sinus headaches/congestion no runny nose no sore throat  Neck no neck pain   RESPIRATORY: denies shortness of breath with exertion  CARDIOVASCULAR: denies chest pain on exertion  GI: denies abdominal pain and  denies heartburn  NEURO: denies headaches  Psych normal mood      EXAM:   /58   Pulse 72   Temp 97.3 °F (36.3 °C) (Temporal)   Resp 16   Ht 6' (1.829 m)   Wt 203 lb (92.1 kg)   SpO2 95%   BMI 27.53 kg/m²   GENERAL: well developed, well nourished,in no apparent distress  SKIN: no rashes,no suspicious lesions, rash in the groin area consistent with tinea cruris  HEENT: atraumatic, normocephalic,ears and throat are clear  NECK: supple,no adenopathy,  LUNGS: clear to auscultation  CARDIO: RRR without murmur  GI: good BS's,no masses, HSM or tenderness  EXTREMITIES: no edema  Psychiatric - alert  and oriented x3, normal mood      Results for orders placed or performed in visit on 07/16/25   Microalb/Creat Ratio, Random Urine    Collection Time: 07/16/25  9:02 AM   Result Value Ref Range    Microalbumin, Urine 1.90 mg/dL    Creatinine Ur Random 137.10 mg/dL    Malb/Cre Calc 13.9 <=30.0 ug/mg   Hemoglobin A1C    Collection Time: 07/16/25  9:02 AM   Result Value Ref Range    HgbA1C 7.1 (H) <5.7 %    Estimated Average Glucose 157 (H) 68 - 126 mg/dL   Lipid Panel    Collection Time: 07/16/25  9:02 AM   Result Value Ref Range    Cholesterol, Total 133 <200 mg/dL    HDL Cholesterol 41 40 - 59 mg/dL    Triglycerides 128 30 - 149 mg/dL    LDL Cholesterol 69 <100 mg/dL    VLDL 19 0 - 30 mg/dL    Non HDL Chol 92 <130 mg/dL    Patient Fasting for Lipid? Yes    Comp Metabolic Panel (14)    Collection Time: 07/16/25  9:02 AM   Result Value Ref Range    Glucose 126 (H) 70 - 99 mg/dL    Sodium 140 136 - 145 mmol/L    Potassium 4.2 3.5 - 5.1 mmol/L    Chloride 105 98 - 112 mmol/L    CO2 30.0 21.0 - 32.0 mmol/L    Anion Gap 5 0 - 18 mmol/L    BUN 15 9 - 23 mg/dL    Creatinine 1.08 0.70 - 1.30 mg/dL    Calcium, Total 9.7 8.7 - 10.6 mg/dL    Calculated Osmolality 292 275 - 295 mOsm/kg    eGFR-Cr 68 >=60 mL/min/1.73m2    AST 20 <34 U/L    ALT 16 10 - 49 U/L    Alkaline Phosphatase 51 45 - 117 U/L    Bilirubin, Total 1.0 0.2 - 1.1  mg/dL    Total Protein 7.3 5.7 - 8.2 g/dL    Albumin 4.7 3.2 - 4.8 g/dL    Globulin  2.6 2.0 - 3.5 g/dL    A/G Ratio 1.8 1.0 - 2.0    Patient Fasting for CMP? Yes       ASSESSMENT AND PLAN:     Encounter Diagnoses   Name Primary?    Diabetes mellitus type 2 without retinopathy (HCC) Yes    Coronary artery calcification seen on CT scan     Mild intermittent asthma, unspecified whether complicated (HCC)     Atherosclerosis of native coronary artery of native heart without angina pectoris     Bilateral carotid artery stenosis     Type 2 diabetes mellitus with other circulatory complications (HCC)     Fatty liver     Elevated PSA measurement     Hyperlipidemia, unspecified hyperlipidemia type     Tinea cruris          Orders Placed This Encounter   Procedures    Comp Metabolic Panel (14)    Lipid Panel    TSH W Reflex To Free T4    CBC With Differential With Platelet    Hemoglobin A1C    Microalb/Creat Ratio, Random Urine    UA/M With Culture Reflex [E]       Meds & Refills for this Visit:  Requested Prescriptions     Signed Prescriptions Disp Refills    rosuvastatin 5 MG Oral Tab 12 tablet 0     Sig: Take 1 tablet (5 mg total) by mouth 3 (three) times a week.    metFORMIN 500 MG Oral Tab 90 tablet 1     Sig: Take 1 tablet (500 mg total) by mouth daily with breakfast.    nystatin 100,000 Units/g External Cream 60 g 1     Sig: Apply 1 Application topically 2 (two) times daily.    triamcinolone 0.1 % External Cream 30 g 0     Sig: Apply 1 Application topically 2 (two) times daily. Use for 10 days   Apply triamcinolone cream twice a day.  Use nystatin cream 2-3 times per day until rash is completely gone.  Continue current meds.   Watch diet for fats and carbs.  Decrease cake intake, avoid juices.  Stay active.    Call [phone] to schedule US carotids.   Call neurologist office and see if they could accommodate you sooner to evaluate your symptoms.  Schedule Medicare wellness visit for end January 2025.    VISIT  SUMMARY:  During today's visit, we reviewed your diabetes management, discussed your nocturia, and addressed the inflammation in your groin area. We also checked your blood pressure and cholesterol levels, which are well-controlled.    YOUR PLAN:  -TYPE 2 DIABETES MELLITUS: Your hemoglobin A1c has increased from 6.4% to 7.1%, indicating that your blood sugar control has worsened. This is likely due to increased consumption of cake and coffee. It's important to control your blood sugar to prevent complications like heart disease, kidney problems, and vision issues. Please reduce your intake of cake and coffee, especially during weekdays. Continue taking metformin as prescribed. Schedule an eye exam by the end of the year and we will order a carotid ultrasound to check for plaque buildup in your arteries.    -NOCTURIA: You are waking up 5-6 times at night to urinate, which is affecting your sleep. The medication you were previously prescribed is no longer effective. Please contact your new urologist, Dr. Merchant, to address these symptoms sooner than your scheduled October appointment. Try to get on a cancellation list for an earlier appointment.    -TINEA CRURIS: You have inflammation and irritation in your groin area, likely due to a fungal infection made worse by hot weather. This condition is not painful but causes discomfort. Use triamcinolone cream twice daily for 10 days to reduce inflammation and nystatin cream twice daily until the infection clears, which may take 1-2 months.    -HYPERTENSION: Your blood pressure is well-controlled at 130/58 mmHg. Continue with your current management plan.    -HYPERLIPIDEMIA: Your cholesterol levels are well-controlled with your current medication. Continue taking rosuvastatin as prescribed.    INSTRUCTIONS:  Please schedule an eye exam by the end of the year. We will also order a carotid ultrasound to check for plaque buildup in your arteries. Contact Dr. Merchant 's office to  be placed on a cancellation list for an earlier appointment regarding your nocturia.    Contains text generated by Merlin         Imaging & Consults:  US CAROTID DOPPLER BILAT - DIAG IMG (CPT=93880)    The patient indicates understanding of these issues and agrees to the plan.  The patient is asked to return in January 2026.  The note was dictated using speech recognition software.  Accuracy and grammar in transcription may be subject to error.

## 2025-07-23 NOTE — PATIENT INSTRUCTIONS
Apply triamcinolone cream twice a day.  Use nystatin cream 2-3 times per day until rash is completely gone.  Continue current meds.   Watch diet for fats and carbs.  Decrease cake intake, avoid juices.  Stay active.    Call [phone] to schedule US carotids.   Call neurologist office and see if they could accommodate you sooner to evaluate your symptoms.  Schedule Medicare wellness visit for end January 2025.    VISIT SUMMARY:  During today's visit, we reviewed your diabetes management, discussed your nocturia, and addressed the inflammation in your groin area. We also checked your blood pressure and cholesterol levels, which are well-controlled.    YOUR PLAN:  -TYPE 2 DIABETES MELLITUS: Your hemoglobin A1c has increased from 6.4% to 7.1%, indicating that your blood sugar control has worsened. This is likely due to increased consumption of cake and coffee. It's important to control your blood sugar to prevent complications like heart disease, kidney problems, and vision issues. Please reduce your intake of cake and coffee, especially during weekdays. Continue taking metformin as prescribed. Schedule an eye exam by the end of the year and we will order a carotid ultrasound to check for plaque buildup in your arteries.    -NOCTURIA: You are waking up 5-6 times at night to urinate, which is affecting your sleep. The medication you were previously prescribed is no longer effective. Please contact your new urologist, Dr. Merchant, to address these symptoms sooner than your scheduled October appointment. Try to get on a cancellation list for an earlier appointment.    -TINEA CRURIS: You have inflammation and irritation in your groin area, likely due to a fungal infection made worse by hot weather. This condition is not painful but causes discomfort. Use triamcinolone cream twice daily for 10 days to reduce inflammation and nystatin cream twice daily until the infection clears, which may take 1-2 months.    -HYPERTENSION:  Your blood pressure is well-controlled at 130/58 mmHg. Continue with your current management plan.    -HYPERLIPIDEMIA: Your cholesterol levels are well-controlled with your current medication. Continue taking rosuvastatin as prescribed.    INSTRUCTIONS:  Please schedule an eye exam by the end of the year. We will also order a carotid ultrasound to check for plaque buildup in your arteries. Contact Dr. Merchant 's office to be placed on a cancellation list for an earlier appointment regarding your nocturia.    Contains text generated by [employer]       Refill policies:      Allow 3 business days for refills; controlled substances may take longer.  Contact your pharmacy at least 5-7 business days prior to running out of medication and have them send an electronic request or submit through the \"request refill\" option thru your [employer] account. No need to do both, as multiple requests will create an automated [employer] message to notify of a denial for one of the duplicated requests, causing you undue confusion.   Refills are NOT addressed on weekends; covering physicians do not authorize routine medications on weekends.  No narcotics or controlled substances are refilled after noon on Fridays or by on call physicians.  By law, narcotics cannot be faxed or phoned into your pharmacy.  If your prescription is due for a refill, you may be due for a follow up appointment. Please call our office at [phone] to make an appointment or schedule an appointment via [employer].  To best provide you care, patients receiving routine medications need to be seen at least twice a year. Patients receiving narcotic/controlled substance medications need to be seen at least once every 3 months.  In the event that your preferred pharmacy does not have the requested medication in stock (ie Backordered), it is your responsibility to find another pharmacy that has the requested medication available. We will gladly send a new prescription to that  pharmacy at your request.  controlled substances may not be able to be filled out of state due to license restrictions.  If you have a planned trip, it's best to call your pharmacy at least 5-7 business days to prevent any delays in your medication refill.    Scheduling Tests:    If your physician has ordered radiology tests such as MRI or CT scans, please contact Central Scheduling at [phone] right away to schedule the test.  Once scheduled, the [facility] Centralized Referral Team will work with your insurance carrier to obtain pre-certification or prior authorization.  Depending on your insurance carrier, approval may take 3-10 days.  It is highly recommended patients assure they have received an authorization before having a test performed.  If test is done without insurance authorization, patient may be responsible for the entire amount billed.      Precertification and Prior Authorizations:  If your physician has recommended that you have a procedure or additional testing performed the [facility] Centralized Referral Team will contact your insurance carrier to obtain pre-certification or prior authorization.    You are strongly encouraged to contact your insurance carrier to verify that your procedure/test has been approved and is a COVERED benefit.  Although the [facility] Centralized Referral Team does its due diligence, the insurance carrier gives the disclaimer that \"Although the procedure is authorized, this does not guarantee payment.\"    Ultimately the patient is responsible for payment.   Thank you for your understanding in this matter.  Paperwork Completion:  If you require FMLA or disability paperwork for your recovery, please make sure to either drop it off or have it faxed to our office at [phone]. Be sure the form has your name and date of birth on it.  The form will be faxed to our Forms Department and they will complete it for you.  There is a 25$ fee for all forms that need to be filled out.  Please be  aware there is a 10-14 day turnaround time.  You will need to sign a release of information (ARTEMIO) form if your paperwork does not come with one.  You may call the Forms Department with any questions at [phone].  Their fax number is [phone].

## 2025-07-24 ENCOUNTER — PATIENT MESSAGE (OUTPATIENT)
Dept: FAMILY MEDICINE CLINIC | Facility: CLINIC | Age: 85
End: 2025-07-24

## 2025-07-24 NOTE — TELEPHONE ENCOUNTER
This encounter is now closed.     Last office visit with Dr Campbell. On Tamsu and Finasteride   RN called patient offered appt on 7/30. Accepted appt. Instructions/directions given  Agreed to cancel August appt   Good BM daily. 2 drinks of coffee at AM and PM.   Urgency and frequency home intervention discussed.   He agreed to plans.

## 2025-07-24 NOTE — TELEPHONE ENCOUNTER
Patient would like confirmation on how to use the nystatin cream and the Triamcinolone cream. Both were prescribed for twice daily per patient.

## 2025-07-24 NOTE — TELEPHONE ENCOUNTER
The both of the creams could be used twice a day,  he can apply both at the same time. Triamcinolone is  for 10 days and nystatin longer.  The directions are on the after visit summary, I think it will be on the last page:    \"Use triamcinolone cream twice daily for 10 days to reduce inflammation and nystatin cream twice daily until the infection clears, which may take 1-2 months. ?    Thanks.

## 2025-07-30 ENCOUNTER — OFFICE VISIT (OUTPATIENT)
Dept: SURGERY | Facility: CLINIC | Age: 85
End: 2025-07-30
Payer: MEDICARE

## 2025-07-30 DIAGNOSIS — R33.9 URINARY RETENTION: ICD-10-CM

## 2025-07-30 DIAGNOSIS — N20.0 KIDNEY STONE: Primary | ICD-10-CM

## 2025-07-30 PROCEDURE — G2211 COMPLEX E/M VISIT ADD ON: HCPCS | Performed by: UROLOGY

## 2025-07-30 PROCEDURE — 99214 OFFICE O/P EST MOD 30 MIN: CPT | Performed by: UROLOGY

## 2025-07-30 PROCEDURE — 1159F MED LIST DOCD IN RCRD: CPT | Performed by: UROLOGY

## 2025-07-30 RX ORDER — TAMSULOSIN HYDROCHLORIDE 0.4 MG/1
0.8 CAPSULE ORAL EVERY EVENING
Qty: 180 CAPSULE | Refills: 5 | Status: SHIPPED | OUTPATIENT
Start: 2025-07-30

## 2025-08-04 ENCOUNTER — MED MANAGEMENT (OUTPATIENT)
Age: 85
End: 2025-08-04

## 2025-08-11 ENCOUNTER — HOSPITAL ENCOUNTER (OUTPATIENT)
Dept: ULTRASOUND IMAGING | Age: 85
Discharge: HOME | End: 2025-08-11
Attending: FAMILY MEDICINE

## 2025-08-11 DIAGNOSIS — I65.23 BILATERAL CAROTID ARTERY STENOSIS: ICD-10-CM

## 2025-08-11 PROCEDURE — 93880 EXTRACRANIAL BILAT STUDY: CPT | Performed by: FAMILY MEDICINE

## 2025-08-15 ENCOUNTER — OFFICE VISIT (OUTPATIENT)
Dept: ORTHOPEDICS CLINIC | Facility: CLINIC | Age: 85
End: 2025-08-15

## 2025-08-15 ENCOUNTER — PATIENT MESSAGE (OUTPATIENT)
Dept: ORTHOPEDICS CLINIC | Facility: CLINIC | Age: 85
End: 2025-08-15

## 2025-08-15 ENCOUNTER — HOSPITAL ENCOUNTER (OUTPATIENT)
Dept: GENERAL RADIOLOGY | Age: 85
Discharge: HOME | End: 2025-08-15
Attending: ORTHOPAEDIC SURGERY

## 2025-08-15 VITALS — BODY MASS INDEX: 28.65 KG/M2 | WEIGHT: 202.38 LBS | HEIGHT: 70.5 IN

## 2025-08-15 DIAGNOSIS — M25.552 BILATERAL HIP PAIN: ICD-10-CM

## 2025-08-15 DIAGNOSIS — M25.551 RIGHT HIP PAIN: Primary | ICD-10-CM

## 2025-08-15 DIAGNOSIS — M25.551 BILATERAL HIP PAIN: ICD-10-CM

## 2025-08-15 PROCEDURE — 1160F RVW MEDS BY RX/DR IN RCRD: CPT | Performed by: ORTHOPAEDIC SURGERY

## 2025-08-15 PROCEDURE — 73523 X-RAY EXAM HIPS BI 5/> VIEWS: CPT | Performed by: ORTHOPAEDIC SURGERY

## 2025-08-15 PROCEDURE — 3008F BODY MASS INDEX DOCD: CPT | Performed by: ORTHOPAEDIC SURGERY

## 2025-08-15 PROCEDURE — 99203 OFFICE O/P NEW LOW 30 MIN: CPT | Performed by: ORTHOPAEDIC SURGERY

## 2025-08-15 PROCEDURE — 1159F MED LIST DOCD IN RCRD: CPT | Performed by: ORTHOPAEDIC SURGERY

## 2025-08-25 ENCOUNTER — HOSPITAL ENCOUNTER (OUTPATIENT)
Dept: ULTRASOUND IMAGING | Age: 85
Discharge: HOME | End: 2025-08-25
Attending: UROLOGY

## 2025-08-25 DIAGNOSIS — N20.0 KIDNEY STONE: ICD-10-CM

## 2025-08-25 DIAGNOSIS — R33.9 URINARY RETENTION: ICD-10-CM

## 2025-08-25 PROCEDURE — 76770 US EXAM ABDO BACK WALL COMP: CPT | Performed by: UROLOGY

## (undated) DIAGNOSIS — E78.5 HYPERLIPIDEMIA, UNSPECIFIED HYPERLIPIDEMIA TYPE: Primary | ICD-10-CM

## (undated) DEVICE — CAP SEALING, REVEAL 15.0MM

## (undated) DEVICE — Device: Brand: DEFENDO AIR/WATER/SUCTION AND BIOPSY VALVE

## (undated) DEVICE — 1200CC GUARDIAN II: Brand: GUARDIAN

## (undated) DEVICE — 3M™ RED DOT™ MONITORING ELECTRODE WITH FOAM TAPE AND STICKY GEL, 50/BAG, 20/CASE, 72/PLT 2570: Brand: RED DOT™

## (undated) DEVICE — ENDOSCOPY PACK - LOWER: Brand: MEDLINE INDUSTRIES, INC.

## (undated) DEVICE — FILTERLINE NASAL ADULT O2/CO2

## (undated) DEVICE — SNARE 9MM 230CM 2.4MM EXACTO

## (undated) DEVICE — TRAP SPEC REMOVAL ETRAP 15CM

## (undated) NOTE — Clinical Note
I have seen Chuy for his preop evaluation. He is good to go for his surgery as scheduled. Thank you very much for consultation.

## (undated) NOTE — LETTER
20            Chuy Dumont Other  : 1940            To Whom It May Concern:      I am the primary care physician for the above named patient. Per CDC guidelines, his age and medical history place him in a high risk category for Covid.  I am recommen

## (undated) NOTE — MR AVS SNAPSHOT
[facility], [address] [address] [phone]               Thank you for choosing us for your health care visit with Tania Lo MD.  We are glad to serve you and happy to provide you with this [facility]erral - In Network    Complete by:  As directed    Assoc Dx:  Controlled type 2 diabetes mellitus without complication, without long-term current use of insulin ([facility] [address] [E11.9], Atherosclerosis [I70.90], Abnormal CT scan, heart [R93.1] view more details from this visit by going to [email or web address]. If you've recently had a stay at the Hospital you can access your discharge instructions in [employer] by going to Visits < Admission Summaries.  If you've been to the Emergency Depar Dietary sodium reduction Reduce dietary sodium intake to <= 100 mmol per day (2.4 g sodium or 6 g sodium chloride)   Aerobic physical activity Regular aerobic physical activity (e.g., brisk walking, light jogging, cycling, swimming, etc.) for a goal of at

## (undated) NOTE — ED AVS SNAPSHOT
Chuy Lozano   MRN: [id number]    Department:  [facility] Emergency Department   Date of Visit:  8/30/2018           Disclosure     Insurance plans vary and the physician(s) referred by the ER may not be covered by your plan.  Please contact tell this physician (or your personal doctor if your instructions are to return to your personal doctor) about any new or lasting problems. The primary care or specialist physician will see patients referred from the [facility] Emergency Department.  Steffanie Ashby